# Patient Record
Sex: FEMALE | Race: WHITE | Employment: OTHER | URBAN - METROPOLITAN AREA
[De-identification: names, ages, dates, MRNs, and addresses within clinical notes are randomized per-mention and may not be internally consistent; named-entity substitution may affect disease eponyms.]

---

## 2018-09-10 ENCOUNTER — HOSPITAL ENCOUNTER (OUTPATIENT)
Dept: MAMMOGRAPHY | Age: 68
Discharge: HOME | End: 2018-09-15
Payer: COMMERCIAL

## 2018-09-10 DIAGNOSIS — Z12.31 VISIT FOR SCREENING MAMMOGRAM: ICD-10-CM

## 2018-09-10 PROCEDURE — 77067 SCR MAMMO BI INCL CAD: CPT

## 2018-09-11 DIAGNOSIS — Z12.31 VISIT FOR SCREENING MAMMOGRAM: Primary | ICD-10-CM

## 2019-11-24 ENCOUNTER — HOSPITAL ENCOUNTER (EMERGENCY)
Age: 69
Discharge: HOME | End: 2019-11-24
Attending: EMERGENCY MEDICINE
Payer: COMMERCIAL

## 2019-11-24 ENCOUNTER — APPOINTMENT (OUTPATIENT)
Dept: GENERAL RADIOLOGY | Age: 69
End: 2019-11-24
Payer: COMMERCIAL

## 2019-11-24 VITALS
BODY MASS INDEX: 30.82 KG/M2 | WEIGHT: 157 LBS | HEIGHT: 60 IN | SYSTOLIC BLOOD PRESSURE: 173 MMHG | RESPIRATION RATE: 20 BRPM | HEART RATE: 84 BPM | TEMPERATURE: 98 F | DIASTOLIC BLOOD PRESSURE: 87 MMHG | OXYGEN SATURATION: 99 %

## 2019-11-24 DIAGNOSIS — J40 BRONCHITIS: Primary | ICD-10-CM

## 2019-11-24 PROCEDURE — 71046 X-RAY EXAM CHEST 2 VIEWS: CPT

## 2019-11-24 PROCEDURE — 99283 EMERGENCY DEPT VISIT LOW MDM: CPT

## 2019-11-24 RX ORDER — PROMETHAZINE HYDROCHLORIDE AND CODEINE PHOSPHATE 6.25; 1 MG/5ML; MG/5ML
5 SYRUP ORAL 4 TIMES DAILY PRN
Qty: 60 ML | Status: SHIPPED | OUTPATIENT
Start: 2019-11-24 | End: 2019-12-01

## 2019-11-24 RX ORDER — CEFDINIR 300 MG/1
300 CAPSULE ORAL 2 TIMES DAILY
Qty: 20 CAPSULE | Refills: 0 | Status: SHIPPED | OUTPATIENT
Start: 2019-11-24 | End: 2019-12-04

## 2019-11-24 RX ORDER — FAMOTIDINE 20 MG/1
20 TABLET, FILM COATED ORAL 2 TIMES DAILY
COMMUNITY
End: 2020-02-26

## 2019-11-24 RX ORDER — FENOFIBRATE 145 MG/1
145 TABLET, COATED ORAL DAILY
COMMUNITY
End: 2020-07-14

## 2019-11-24 RX ORDER — LISINOPRIL 10 MG/1
10 TABLET ORAL DAILY
COMMUNITY
End: 2020-02-26

## 2019-11-24 RX ORDER — PROPRANOLOL HYDROCHLORIDE 80 MG/1
80 TABLET ORAL NIGHTLY
COMMUNITY

## 2019-11-24 RX ORDER — ALLOPURINOL 300 MG/1
300 TABLET ORAL DAILY
COMMUNITY

## 2019-11-24 ASSESSMENT — ENCOUNTER SYMPTOMS
VOMITING: 0
DIARRHEA: 0
EYE REDNESS: 0
BACK PAIN: 0
RHINORRHEA: 0
COUGH: 1
SHORTNESS OF BREATH: 0
ABDOMINAL PAIN: 0
EYE DISCHARGE: 0

## 2020-02-03 ENCOUNTER — HOSPITAL ENCOUNTER (OUTPATIENT)
Age: 70
Discharge: HOME | End: 2020-02-03
Payer: COMMERCIAL

## 2020-02-03 LAB
EKG ATRIAL RATE: 70 BPM
EKG DIAGNOSIS: NORMAL
EKG P AXIS: 68 DEGREES
EKG P-R INTERVAL: 170 MS
EKG Q-T INTERVAL: 382 MS
EKG QRS DURATION: 88 MS
EKG QTC CALCULATION (BAZETT): 412 MS
EKG R AXIS: 48 DEGREES
EKG T AXIS: 47 DEGREES
EKG VENTRICULAR RATE: 70 BPM

## 2020-02-03 PROCEDURE — 93005 ELECTROCARDIOGRAM TRACING: CPT | Performed by: FAMILY MEDICINE

## 2020-02-03 PROCEDURE — 93010 ELECTROCARDIOGRAM REPORT: CPT | Performed by: INTERNAL MEDICINE

## 2020-02-14 ENCOUNTER — HOSPITAL ENCOUNTER (OUTPATIENT)
Dept: VASCULAR LAB | Age: 70
Discharge: HOME | End: 2020-02-14
Payer: COMMERCIAL

## 2020-02-14 PROCEDURE — 93880 EXTRACRANIAL BILAT STUDY: CPT

## 2020-02-26 ENCOUNTER — HOSPITAL ENCOUNTER (EMERGENCY)
Age: 70
Discharge: HOME | End: 2020-02-26
Attending: EMERGENCY MEDICINE
Payer: COMMERCIAL

## 2020-02-26 VITALS
TEMPERATURE: 97.9 F | HEIGHT: 60 IN | HEART RATE: 96 BPM | WEIGHT: 159 LBS | BODY MASS INDEX: 31.22 KG/M2 | DIASTOLIC BLOOD PRESSURE: 96 MMHG | OXYGEN SATURATION: 99 % | RESPIRATION RATE: 16 BRPM | SYSTOLIC BLOOD PRESSURE: 173 MMHG

## 2020-02-26 LAB
ALBUMIN SERPL-MCNC: 4.5 G/DL (ref 3.4–5)
ANION GAP SERPL CALCULATED.3IONS-SCNC: 13 MMOL/L (ref 3–16)
BASOPHILS ABSOLUTE: 0 K/UL (ref 0–0.2)
BASOPHILS RELATIVE PERCENT: 1 %
BUN BLDV-MCNC: 25 MG/DL (ref 7–20)
CALCIUM SERPL-MCNC: 11.7 MG/DL (ref 8.3–10.6)
CHLORIDE BLD-SCNC: 101 MMOL/L (ref 99–110)
CO2: 25 MMOL/L (ref 21–32)
CREAT SERPL-MCNC: 1.6 MG/DL (ref 0.6–1.2)
EOSINOPHILS ABSOLUTE: 0.1 K/UL (ref 0–0.6)
EOSINOPHILS RELATIVE PERCENT: 2.8 %
GFR AFRICAN AMERICAN: 39
GFR NON-AFRICAN AMERICAN: 32
GLUCOSE BLD-MCNC: 226 MG/DL (ref 70–99)
HCT VFR BLD CALC: 44.8 % (ref 36–48)
HEMOGLOBIN: 15.1 G/DL (ref 12–16)
LYMPHOCYTES ABSOLUTE: 1.4 K/UL (ref 1–5.1)
LYMPHOCYTES RELATIVE PERCENT: 34.2 %
MCH RBC QN AUTO: 28.6 PG (ref 26–34)
MCHC RBC AUTO-ENTMCNC: 33.7 G/DL (ref 31–36)
MCV RBC AUTO: 85 FL (ref 80–100)
MONOCYTES ABSOLUTE: 0.3 K/UL (ref 0–1.3)
MONOCYTES RELATIVE PERCENT: 8.1 %
NEUTROPHILS ABSOLUTE: 2.2 K/UL (ref 1.7–7.7)
NEUTROPHILS RELATIVE PERCENT: 53.9 %
PDW BLD-RTO: 15.1 % (ref 12.4–15.4)
PLATELET # BLD: 132 K/UL (ref 135–450)
PMV BLD AUTO: 6.9 FL (ref 5–10.5)
POTASSIUM REFLEX MAGNESIUM: 3.9 MMOL/L (ref 3.5–5.1)
RBC # BLD: 5.27 M/UL (ref 4–5.2)
SODIUM BLD-SCNC: 139 MMOL/L (ref 136–145)
WBC # BLD: 4 K/UL (ref 4–11)

## 2020-02-26 PROCEDURE — 80048 BASIC METABOLIC PNL TOTAL CA: CPT

## 2020-02-26 PROCEDURE — 99283 EMERGENCY DEPT VISIT LOW MDM: CPT

## 2020-02-26 PROCEDURE — 36415 COLL VENOUS BLD VENIPUNCTURE: CPT

## 2020-02-26 PROCEDURE — 82040 ASSAY OF SERUM ALBUMIN: CPT

## 2020-02-26 PROCEDURE — 85025 COMPLETE CBC W/AUTO DIFF WBC: CPT

## 2020-02-26 RX ORDER — CETIRIZINE HYDROCHLORIDE 10 MG/1
TABLET ORAL
COMMUNITY
Start: 2019-11-26

## 2020-02-26 RX ORDER — TRIAMTERENE AND HYDROCHLOROTHIAZIDE 75; 50 MG/1; MG/1
1 TABLET ORAL DAILY
COMMUNITY
End: 2021-03-16 | Stop reason: ALTCHOICE

## 2020-02-26 RX ORDER — FAMOTIDINE 20 MG/1
TABLET, FILM COATED ORAL
COMMUNITY
End: 2021-03-16 | Stop reason: ALTCHOICE

## 2020-02-26 ASSESSMENT — ENCOUNTER SYMPTOMS
SORE THROAT: 0
COUGH: 0
WHEEZING: 0
CHEST TIGHTNESS: 0
RHINORRHEA: 0
DIARRHEA: 0
SHORTNESS OF BREATH: 0
ABDOMINAL PAIN: 0
STRIDOR: 0
TROUBLE SWALLOWING: 0
VOMITING: 0

## 2020-02-26 NOTE — ED NOTES
Pt DC home in good condition with family. V/u of Dc instructions. Denies questions or concerns. Teaching done re: s/s to report.      Efra Pickett RN  02/26/20 0061

## 2020-02-26 NOTE — ED PROVIDER NOTES
[address] [facility]      Pt Name: Red Miles  MRN: [id number]  [place] 1950  Date of evaluation: 2/26/2020  Severa Sailors, MD    [address] [place] [address]       Chief Complaint   Patient presents with    Abnormal Lab     pt sent by pcp for high calcium level drawn on Feb. 3rd. pt denies any complaints states she feels fine. HISTORY OF PRESENT ILLNESS   (Location/Symptom, Timing/Onset,Context/Setting, Quality, Duration, Modifying Factors, Severity)  Note limiting factors. Red Miles is a 78 y.o. female with hx of DM, HTN who presents to the emergency department for abnormal lab. Patient states her PCP called and instructed her to go to ED for elevated calcium. Patient states she's had an episode of fall 4 weeks ago, and pcp ordered labs done 2 weeks ago. However, her  works for [employer] so it's free for her to get labs there. She got it done 2 weeks ago and took it to her pcp 1.5 weeks ago, and was told that calcium was elevated and to go to ED. Patient unaware what the level was. States she is no longer falling, denies cp, sob, fever, n/v, blurry vision, weakness, fatigue. Lightheadedness, dizzy; states she's been feeling good, has no complaints. HPI    Nursing Notes were reviewed. REVIEW OF SYSTEMS    (2-9 systems for level 4, 10 or more for level 5)     Review of Systems   Constitutional: Negative for activity change, appetite change, diaphoresis and fever. HENT: Negative for congestion, rhinorrhea, sore throat and trouble swallowing. Respiratory: Negative for cough, chest tightness, shortness of breath, wheezing and stridor. Cardiovascular: Negative for chest pain and palpitations. Gastrointestinal: Negative for abdominal pain, diarrhea and vomiting. Genitourinary: Negative for dysuria and flank pain. Skin: Negative for rash and wound.    Neurological: Negative for dizziness, weakness, light-headedness, numbness and General: Skin is warm and dry. Findings: No rash. Neurological:      Mental Status: She is alert and oriented to person, place, and time. GCS: GCS eye subscore is 4. GCS verbal subscore is 5. GCS motor subscore is 6. Psychiatric:         Behavior: Behavior is cooperative. DIAGNOSTIC RESULTS     EKG: All EKG's are interpreted by the Emergency Department Physicianwho either signs or Co-signs this chart in the absence of a cardiologist.      RADIOLOGY:   Non-plain film images such as CT, Ultrasound and MRI are read by the radiologist. Plain radiographic images are visualized and preliminarily interpreted by the emergency physician with the below findings:      Interpretation per the Radiologist below, if available at the time of this note:    No orders to display         ED BEDSIDE ULTRASOUND:   Performed by ED Physician - none    LABS:  Labs Reviewed   CBC WITH AUTO DIFFERENTIAL - Abnormal; Notable for the following components:       Result Value    RBC 5.27 (*)     Platelets 411 (*)     All other components within normal limits    Narrative:     Performed at:  [facility]. [facility]  [address] [place] [address]. [place], [address] [email or web address]   Phone [phone]   BASIC METABOLIC PANEL W/ REFLEX TO MG FOR LOW K - Abnormal; Notable for the following components:    Glucose 226 (*)     BUN 25 (*)     CREATININE 1.6 (*)     GFR Non- 32 (*)     GFR  39 (*)     Calcium 11.7 (*)     All other components within normal limits    Narrative:     Performed at:  [facility]. [facility]  [address] [place] [address]. [facility], [address] [email or web address]   Phone [phone]   ALBUMIN    Narrative:     Performed at:  [facility]. [facility]  [address] [place] [address]. [facility], [address]   Phone [phone]       All other labs were within normal range ornot returned as of this dictation.     EMERGENCY DEPARTMENT COURSE and DIFFERENTIAL DIAGNOSIS/MDM:   Vitals:    Vitals:    02/26/20 1452   BP: (!) 173/96   Pulse: 96   Resp: 16   Temp: 97.9 °F (36.6 °C)   TempSrc: Oral   SpO2: 99%   Weight: 159 lb (72.1 kg)   Height: 5' (1.524 m)         [facility]    ED COURSE/MDM    -Renu Child is a 78 y.o. female with a history of diabetes, hypertension presents to ED for abnormal lab. Patient states she was sent for elevated calcium that was done on 2/3/2020. Patient is asymptomatic with no complaints  -per family cr 2.06 and ca 12.5 (2/3). per chart review, labs from [id number] shows calcium of 10.7-11.4 with creatinine 1.09-1.89 . Patient seems to have calcium that runs a little bit above the upper end of normal at baseline.   -Labs here today shows a creatinine of 1.6, calcium 11.7 (11.3 - corrected). So as compared to labs from 2/3, both creatinine and calcium seem to have improved. Given level of 11.3, patient asymptomatic do not feel that immediate treatment is needed at this time.    -Labs and imaging reviewed and results discussed with patient. Noacute pathology was noted and plan for discharge home with close follow up with PCP was discussed with patient. Instructed on what to avoid anything that might aggravate hypercalcemia. He is on hydrochlorothiazide for her blood pressure, can aggravate hypercalcemia. Instructed her to call her primary care tomorrow to see if here she would be willing to change her blood pressure to another medication that will not aggravate the calcium. Also instructed her to discuss possible CKD as her creatinine level seems to have been elevating over the last several years. It is however improved today from prior levels. -Strict ED return precautions given for new/concerning symptoms. Patient in agreement withplan, verbally confirm understanding and have no further questions/concerns.      REASSESSMENT      Well appearing, non toxic, alert, oriented speaking in full sentences and hemodynamically stable

## 2020-07-14 ENCOUNTER — APPOINTMENT (OUTPATIENT)
Dept: CT IMAGING | Age: 70
End: 2020-07-14
Payer: COMMERCIAL

## 2020-07-14 ENCOUNTER — APPOINTMENT (OUTPATIENT)
Dept: GENERAL RADIOLOGY | Age: 70
End: 2020-07-14
Payer: COMMERCIAL

## 2020-07-14 ENCOUNTER — HOSPITAL ENCOUNTER (EMERGENCY)
Age: 70
Discharge: ANOTHER HOSPITAL | End: 2020-07-14
Attending: EMERGENCY MEDICINE
Payer: COMMERCIAL

## 2020-07-14 ENCOUNTER — HOSPITAL ENCOUNTER (OUTPATIENT)
Age: 70
Setting detail: OBSERVATION
Discharge: HOME | End: 2020-07-15
Attending: INTERNAL MEDICINE | Admitting: INTERNAL MEDICINE
Payer: COMMERCIAL

## 2020-07-14 VITALS
OXYGEN SATURATION: 96 % | WEIGHT: 180 LBS | TEMPERATURE: 98.8 F | BODY MASS INDEX: 35.34 KG/M2 | HEIGHT: 60 IN | HEART RATE: 85 BPM | SYSTOLIC BLOOD PRESSURE: 198 MMHG | RESPIRATION RATE: 22 BRPM | DIASTOLIC BLOOD PRESSURE: 93 MMHG

## 2020-07-14 PROBLEM — G45.9 TIA (TRANSIENT ISCHEMIC ATTACK): Status: ACTIVE | Noted: 2020-07-14

## 2020-07-14 LAB
A/G RATIO: 1.9 (ref 1.1–2.2)
ALBUMIN SERPL-MCNC: 4.8 G/DL (ref 3.4–5)
ALP BLD-CCNC: 49 U/L (ref 40–129)
ALT SERPL-CCNC: 27 U/L (ref 10–40)
AMPHETAMINE SCREEN, URINE: NORMAL
ANION GAP SERPL CALCULATED.3IONS-SCNC: 12 MMOL/L (ref 3–16)
AST SERPL-CCNC: 37 U/L (ref 15–37)
BACTERIA: ABNORMAL /HPF
BARBITURATE SCREEN URINE: NORMAL
BASOPHILS ABSOLUTE: 0.1 K/UL (ref 0–0.2)
BASOPHILS RELATIVE PERCENT: 1.1 %
BENZODIAZEPINE SCREEN, URINE: NORMAL
BILIRUB SERPL-MCNC: 0.8 MG/DL (ref 0–1)
BILIRUBIN URINE: NEGATIVE
BLOOD, URINE: NEGATIVE
BUN BLDV-MCNC: 21 MG/DL (ref 7–20)
CALCIUM SERPL-MCNC: 11.2 MG/DL (ref 8.3–10.6)
CANNABINOID SCREEN URINE: NORMAL
CHLORIDE BLD-SCNC: 98 MMOL/L (ref 99–110)
CLARITY: CLEAR
CO2: 28 MMOL/L (ref 21–32)
COCAINE METABOLITE SCREEN URINE: NORMAL
COLOR: YELLOW
CREAT SERPL-MCNC: 1.5 MG/DL (ref 0.6–1.2)
EKG ATRIAL RATE: 107 BPM
EKG DIAGNOSIS: NORMAL
EKG P AXIS: 53 DEGREES
EKG P-R INTERVAL: 184 MS
EKG Q-T INTERVAL: 344 MS
EKG QRS DURATION: 78 MS
EKG QTC CALCULATION (BAZETT): 459 MS
EKG R AXIS: 27 DEGREES
EKG T AXIS: 66 DEGREES
EKG VENTRICULAR RATE: 107 BPM
EOSINOPHILS ABSOLUTE: 0.1 K/UL (ref 0–0.6)
EOSINOPHILS RELATIVE PERCENT: 2.6 %
EPITHELIAL CELLS, UA: ABNORMAL /HPF (ref 0–5)
ETHANOL: NORMAL MG/DL (ref 0–0.08)
GFR AFRICAN AMERICAN: 41
GFR NON-AFRICAN AMERICAN: 34
GLOBULIN: 2.5 G/DL
GLUCOSE BLD-MCNC: 169 MG/DL (ref 70–99)
GLUCOSE BLD-MCNC: 169 MG/DL (ref 70–99)
GLUCOSE URINE: 250 MG/DL
HCT VFR BLD CALC: 45.5 % (ref 36–48)
HEMOGLOBIN: 15 G/DL (ref 12–16)
KETONES, URINE: NEGATIVE MG/DL
LEUKOCYTE ESTERASE, URINE: NEGATIVE
LYMPHOCYTES ABSOLUTE: 1.7 K/UL (ref 1–5.1)
LYMPHOCYTES RELATIVE PERCENT: 36.8 %
Lab: NORMAL
MAGNESIUM: 2.2 MG/DL (ref 1.8–2.4)
MCH RBC QN AUTO: 27.5 PG (ref 26–34)
MCHC RBC AUTO-ENTMCNC: 33 G/DL (ref 31–36)
MCV RBC AUTO: 83.5 FL (ref 80–100)
METHADONE SCREEN, URINE: NORMAL
MICROSCOPIC EXAMINATION: YES
MONOCYTES ABSOLUTE: 0.4 K/UL (ref 0–1.3)
MONOCYTES RELATIVE PERCENT: 8.3 %
NEUTROPHILS ABSOLUTE: 2.4 K/UL (ref 1.7–7.7)
NEUTROPHILS RELATIVE PERCENT: 51.2 %
NITRITE, URINE: NEGATIVE
OPIATE SCREEN URINE: NORMAL
OXYCODONE URINE: NORMAL
PDW BLD-RTO: 15 % (ref 12.4–15.4)
PERFORMED ON: ABNORMAL
PH UA: 7
PH UA: 7 (ref 5–8)
PHENCYCLIDINE SCREEN URINE: NORMAL
PLATELET # BLD: 143 K/UL (ref 135–450)
PMV BLD AUTO: 7.4 FL (ref 5–10.5)
POTASSIUM REFLEX MAGNESIUM: 3.4 MMOL/L (ref 3.5–5.1)
PROPOXYPHENE SCREEN: NORMAL
PROTEIN UA: 30 MG/DL
RBC # BLD: 5.45 M/UL (ref 4–5.2)
RBC UA: ABNORMAL /HPF (ref 0–4)
SODIUM BLD-SCNC: 138 MMOL/L (ref 136–145)
SPECIFIC GRAVITY UA: 1.02 (ref 1–1.03)
TOTAL PROTEIN: 7.3 G/DL (ref 6.4–8.2)
TROPONIN: <0.01 NG/ML
URINE REFLEX TO CULTURE: ABNORMAL
URINE TYPE: ABNORMAL
UROBILINOGEN, URINE: 1 E.U./DL
WBC # BLD: 4.6 K/UL (ref 4–11)
WBC UA: ABNORMAL /HPF (ref 0–5)

## 2020-07-14 PROCEDURE — 93005 ELECTROCARDIOGRAM TRACING: CPT | Performed by: EMERGENCY MEDICINE

## 2020-07-14 PROCEDURE — 83735 ASSAY OF MAGNESIUM: CPT

## 2020-07-14 PROCEDURE — G0378 HOSPITAL OBSERVATION PER HR: HCPCS

## 2020-07-14 PROCEDURE — 84484 ASSAY OF TROPONIN QUANT: CPT

## 2020-07-14 PROCEDURE — 6370000000 HC RX 637 (ALT 250 FOR IP)

## 2020-07-14 PROCEDURE — 80307 DRUG TEST PRSMV CHEM ANLYZR: CPT

## 2020-07-14 PROCEDURE — 99285 EMERGENCY DEPT VISIT HI MDM: CPT

## 2020-07-14 PROCEDURE — 93010 ELECTROCARDIOGRAM REPORT: CPT | Performed by: INTERNAL MEDICINE

## 2020-07-14 PROCEDURE — 85025 COMPLETE CBC W/AUTO DIFF WBC: CPT

## 2020-07-14 PROCEDURE — G0379 DIRECT REFER HOSPITAL OBSERV: HCPCS

## 2020-07-14 PROCEDURE — 70450 CT HEAD/BRAIN W/O DYE: CPT

## 2020-07-14 PROCEDURE — 80053 COMPREHEN METABOLIC PANEL: CPT

## 2020-07-14 PROCEDURE — 81001 URINALYSIS AUTO W/SCOPE: CPT

## 2020-07-14 PROCEDURE — G0480 DRUG TEST DEF 1-7 CLASSES: HCPCS

## 2020-07-14 PROCEDURE — 71045 X-RAY EXAM CHEST 1 VIEW: CPT

## 2020-07-14 PROCEDURE — 6370000000 HC RX 637 (ALT 250 FOR IP): Performed by: EMERGENCY MEDICINE

## 2020-07-14 PROCEDURE — 2580000003 HC RX 258: Performed by: INTERNAL MEDICINE

## 2020-07-14 RX ORDER — SODIUM CHLORIDE 0.9 % (FLUSH) 0.9 %
10 SYRINGE (ML) INJECTION PRN
Status: DISCONTINUED | OUTPATIENT
Start: 2020-07-14 | End: 2020-07-15 | Stop reason: HOSPADM

## 2020-07-14 RX ORDER — PROMETHAZINE HYDROCHLORIDE 25 MG/1
12.5 TABLET ORAL EVERY 6 HOURS PRN
Status: DISCONTINUED | OUTPATIENT
Start: 2020-07-14 | End: 2020-07-15 | Stop reason: HOSPADM

## 2020-07-14 RX ORDER — TRIAMTERENE AND HYDROCHLOROTHIAZIDE 37.5; 25 MG/1; MG/1
2 TABLET ORAL DAILY
Status: DISCONTINUED | OUTPATIENT
Start: 2020-07-15 | End: 2020-07-15 | Stop reason: HOSPADM

## 2020-07-14 RX ORDER — PROPRANOLOL HYDROCHLORIDE 40 MG/1
80 TABLET ORAL NIGHTLY
Status: DISCONTINUED | OUTPATIENT
Start: 2020-07-14 | End: 2020-07-15 | Stop reason: HOSPADM

## 2020-07-14 RX ORDER — SODIUM CHLORIDE 0.9 % (FLUSH) 0.9 %
10 SYRINGE (ML) INJECTION EVERY 12 HOURS SCHEDULED
Status: DISCONTINUED | OUTPATIENT
Start: 2020-07-14 | End: 2020-07-15 | Stop reason: HOSPADM

## 2020-07-14 RX ORDER — PROPRANOLOL HYDROCHLORIDE 10 MG/1
TABLET ORAL
Status: COMPLETED
Start: 2020-07-14 | End: 2020-07-14

## 2020-07-14 RX ORDER — POLYETHYLENE GLYCOL 3350 17 G/17G
17 POWDER, FOR SOLUTION ORAL DAILY PRN
Status: DISCONTINUED | OUTPATIENT
Start: 2020-07-14 | End: 2020-07-15 | Stop reason: HOSPADM

## 2020-07-14 RX ORDER — ASPIRIN 81 MG/1
81 TABLET, CHEWABLE ORAL DAILY
Status: DISCONTINUED | OUTPATIENT
Start: 2020-07-15 | End: 2020-07-15 | Stop reason: HOSPADM

## 2020-07-14 RX ORDER — ACETAMINOPHEN 325 MG/1
650 TABLET ORAL EVERY 6 HOURS PRN
Status: DISCONTINUED | OUTPATIENT
Start: 2020-07-14 | End: 2020-07-15 | Stop reason: HOSPADM

## 2020-07-14 RX ORDER — ASPIRIN 325 MG
325 TABLET ORAL ONCE
Status: COMPLETED | OUTPATIENT
Start: 2020-07-14 | End: 2020-07-14

## 2020-07-14 RX ORDER — ACETAMINOPHEN 650 MG/1
650 SUPPOSITORY RECTAL EVERY 6 HOURS PRN
Status: DISCONTINUED | OUTPATIENT
Start: 2020-07-14 | End: 2020-07-15 | Stop reason: HOSPADM

## 2020-07-14 RX ORDER — ALLOPURINOL 300 MG/1
300 TABLET ORAL DAILY
Status: DISCONTINUED | OUTPATIENT
Start: 2020-07-15 | End: 2020-07-15 | Stop reason: HOSPADM

## 2020-07-14 RX ORDER — FAMOTIDINE 20 MG/1
20 TABLET, FILM COATED ORAL DAILY
Status: DISCONTINUED | OUTPATIENT
Start: 2020-07-15 | End: 2020-07-15 | Stop reason: HOSPADM

## 2020-07-14 RX ORDER — ONDANSETRON 2 MG/ML
4 INJECTION INTRAMUSCULAR; INTRAVENOUS EVERY 6 HOURS PRN
Status: DISCONTINUED | OUTPATIENT
Start: 2020-07-14 | End: 2020-07-15 | Stop reason: HOSPADM

## 2020-07-14 RX ORDER — PROPRANOLOL HYDROCHLORIDE 40 MG/1
80 TABLET ORAL ONCE
Status: COMPLETED | OUTPATIENT
Start: 2020-07-14 | End: 2020-07-14

## 2020-07-14 RX ADMIN — PROPRANOLOL HYDROCHLORIDE 80 MG: 40 TABLET ORAL at 18:18

## 2020-07-14 RX ADMIN — PROPRANOLOL HYDROCHLORIDE 80 MG: 10 TABLET ORAL at 18:18

## 2020-07-14 RX ADMIN — ASPIRIN 325 MG: 325 TABLET, FILM COATED ORAL at 17:32

## 2020-07-14 RX ADMIN — Medication 10 ML: at 22:38

## 2020-07-14 ASSESSMENT — ENCOUNTER SYMPTOMS
ABDOMINAL PAIN: 0
VOICE CHANGE: 0
TROUBLE SWALLOWING: 0
NAUSEA: 0
SHORTNESS OF BREATH: 0
FACIAL SWELLING: 0
VOMITING: 0
COLOR CHANGE: 0
WHEEZING: 0
STRIDOR: 0

## 2020-07-14 ASSESSMENT — PAIN SCALES - GENERAL: PAINLEVEL_OUTOF10: 0

## 2020-07-14 NOTE — ED NOTES
1739 - called [facility] for transfer to [facility]  1500 - pt arrived at ED  NIH - 0  Last know well - today (7-14-20) @ [address]  07/14/20 [facility]  07/14/20 1742    1804 - Dr. Karol Carter called back via [facility]  07/14/20 1807    Dr. Christoph Ocasio gave [facility] transport needs while on phone.       Nikita Contreras  07/14/20 1834

## 2020-07-14 NOTE — ED NOTES
Dr. Aleksandra Tran at bedside for initial NIHSS and assessment of pt. Per verbal order pt is not a candidate for TPA d/t rapid increase in s/s of initial deficits. Terrence Lares RN  07/14/20 1917

## 2020-07-14 NOTE — ED NOTES
Pt alert and verbal, family remains at bedside. Pt able to ambulate self to BR with standby assist of 1. Pt given specimen cup at this time. Leyla Hernandez RN  07/14/20 5981

## 2020-07-14 NOTE — ED PROVIDER NOTES
[address] ED  eMERGENCY dEPARTMENT eNCOUnter      Pt Name: Farooq Estrada  MRN: [id number]  [place] 1950  Date of evaluation: 7/14/2020  Provider: Rhea Wilson MD    CHIEF COMPLAINT       Chief Complaint   Patient presents with    Cerebrovascular Accident     Pt to ED via family pt found in bathtub unable to speak. Pt last seen well @ 1400 today. Family states pt has been confusing words for about a week. Pt garbled speech at 1430         HISTORY OF PRESENT ILLNESS   (Location/Symptom, Timing/Onset, Context/Setting, Quality, Duration, Modifying Factors, Severity)  Note limiting factors. Farooq Estrada is a 78 y.o. female with hx of diabetes, GERD, and hypertension who presents due to significantly garbled speech and concern for stroke which has since resolved. The patient's  reports that the patient has had intermittent confused speech and word finding difficulty for approximately 1 week. He reports he talked her at 2 PM today and she was speaking normally however at 230 the patient was found in the bathtub with severely garbled speech to the point where she was unable to communicate. The patient's  also reports that she was daniella her arm strangely as well. He reports he took her to the hospital and her symptoms have since resolved. He reports the symptoms were severe, lasted for approximately 30 minutes, and resolved spontaneously. He denies any known aggravating or alleviating factors. The patient reports that she remembers not being able to speak but feels better now. She denies any current symptoms. HPI    Nursing Notes were reviewed. REVIEW OFSYSTEMS    (2-9 systems for level 4, 10 or more for level 5)     Review of Systems   Constitutional: Negative for appetite change, fever and unexpected weight change. HENT: Negative for facial swelling, trouble swallowing and voice change. Eyes: Negative for visual disturbance.    Respiratory: Negative for shortness of breath, wheezing and stridor. Cardiovascular: Negative for chest pain and palpitations. Gastrointestinal: Negative for abdominal pain, nausea and vomiting. Genitourinary: Negative for dysuria and vaginal bleeding. Musculoskeletal: Negative for neck pain and neck stiffness. Skin: Negative for color change and wound. Neurological: Positive for speech difficulty. Negative for seizures and syncope. Psychiatric/Behavioral: Negative for self-injury and suicidal ideas. Except as noted above the remainder of the review of systems was reviewed and negative. PAST MEDICAL HISTORY     Past Medical History:   Diagnosis Date    Diabetes mellitus ([facility] [address]     GERD (gastroesophageal reflux disease)     Hypertension          SURGICAL HISTORY     History reviewed. No pertinent surgical history. CURRENT MEDICATIONS       Previous Medications    ALLOPURINOL (ZYLOPRIM) 300 MG TABLET    Take 300 mg by mouth daily    CETIRIZINE (ZYRTEC) 10 MG TABLET    TAKE 1 TABLET BY MOUTH EVERY DAY    FAMOTIDINE (PEPCID) 20 MG TABLET    Take by mouth    METFORMIN (GLUCOPHAGE) 500 MG TABLET    Take 500 mg by mouth daily (with breakfast)     PROPRANOLOL (INDERAL) 80 MG TABLET    Take 80 mg by mouth nightly     TRIAMTERENE-HYDROCHLOROTHIAZIDE (MAXZIDE) 75-50 MG PER TABLET    Take 1 tablet by mouth daily       ALLERGIES     Amoxicillin; Bactrim [sulfamethoxazole-trimethoprim]; Biaxin [clarithromycin]; Calan [verapamil]; and Ciprofloxacin    FAMILY HISTORY     History reviewed. No pertinent family history.        SOCIAL HISTORY       Social History     Socioeconomic History    Marital status:      Spouse name: None    Number of children: None    Years of education: None    Highest education level: None   Occupational History    None   Social Needs    Financial resource strain: None    Food insecurity     Worry: None     Inability: None    Transportation needs     Medical: None     Non-medical: None   Tobacco Use    Smoking status: Never Smoker    Smokeless tobacco: Never Used   Substance and Sexual Activity    Alcohol use: Not Currently    Drug use: Never    Sexual activity: Not Currently   Lifestyle    Physical activity     Days per week: None     Minutes per session: None    Stress: None   Relationships    Social connections     Talks on phone: None     Gets together: None     Attends [employer] service: None     Active member of club or organization: None     Attends meetings of clubs or organizations: None     Relationship status: None    Intimate partner violence     Fear of current or ex partner: None     Emotionally abused: None     Physically abused: None     Forced sexual activity: None   Other Topics Concern    None   Social History Narrative    None         PHYSICAL EXAM    (up to 7 for level 4, 8 or more for level 5)     Vitals:    07/14/20 1534 07/14/20 1549 07/14/20 1604 07/14/20 1618   BP: (!) 209/110 (!) 201/98 (!) 190/95 (!) 183/103   Pulse: 105 108 105 108   Resp: 21 23 18 13   Temp:       TempSrc:       SpO2: 98% 97% 97% 95%   Weight:       Height:             Physical Exam  Vitals signs and nursing note reviewed. Constitutional:       Appearance: She is well-developed. She is not diaphoretic. HENT:      Head: Normocephalic and atraumatic. Right Ear: External ear normal.      Left Ear: External ear normal.   Eyes:      Conjunctiva/sclera: Conjunctivae normal.   Neck:      Musculoskeletal: Neck supple. Vascular: No JVD. Trachea: No tracheal deviation. Cardiovascular:      Rate and Rhythm: Normal rate. Pulmonary:      Effort: Pulmonary effort is normal. No respiratory distress. Breath sounds: Normal breath sounds. No wheezing. Abdominal:      General: There is no distension. Palpations: Abdomen is soft. Tenderness: There is no abdominal tenderness. There is no guarding or rebound. Musculoskeletal: Normal range of motion.          General: No tenderness or deformity. Skin:     General: Skin is warm and dry. Neurological:      Mental Status: She is alert. Cranial Nerves: No cranial nerve deficit. Comments: No facial droop. Normal speech. Alert and oriented to person, place, time, and situation. Able to perform serial sevens. Sensation intact and equal in all CN V distributions of the face bilaterally. 5/5 strength in all 4 extremities. Normal gait. Normal sensation equal in all 4 extremities. Negative romberg. Normal finger to nose and heel to shin. NIH Stroke Scale     Interval: Baseline  Time: 3:12PM  Person Administering Scale: [address] stroke scale items in the order listed. Record performance in each category after each subscale exam. Do not go back and change scores. Follow directions provided for each exam technique. Scores should reflect what the patient does, not what the clinician thinks the patient can do. The clinician should record answers while administering the exam and work quickly. Except where indicated, the patient should not be coached (i.e., repeated requests to patient to make a special effort). 1a  Level of consciousness: 0=alert; keenly responsive   1b. LOC questions:  0=Performs both tasks correctly   1c. LOC commands: 0=Performs both tasks correctly   2. Best Gaze: 0=normal   3. Visual: 0=No visual loss   4. Facial Palsy: 0=Normal symmetric movement   5a. Motor left arm: 0=No drift, limb holds 90 (or 45) degrees for full 10 seconds   5b. Motor right arm: 0=No drift, limb holds 90 (or 45) degrees for full 10 seconds   6a. motor left le=No drift, limb holds 90 (or 45) degrees for full 10 seconds   6b  Motor right le=No drift, limb holds 90 (or 45) degrees for full 10 seconds   7. Limb Ataxia: 0=Absent   8. Sensory: 0=Normal; no sensory loss   9. Best Language:  0=No aphasia, normal   10. Dysarthria: 0=Normal   11.  Extinction and Inattention: 0=No abnormality following components:    Bacteria, UA 1+ (*)     All other components within normal limits    Narrative:     Performed at:  [facility]) - [facility],  [place], [employer]   Phone [phone]   POCT GLUCOSE - Abnormal; Notable for the following components:    POC Glucose 169 (*)     All other components within normal limits    Narrative:     Performed at:  [facility],  [place], [place] [email or web address]   Phone [phone]   TROPONIN    Narrative:     Performed at:  [facility],  [place] [email or web address]   Phone [phone]   ETHANOL    Narrative:     Performed at:  [facility],  [place], [employer]   Phone [phone]   URINE DRUG SCREEN    Narrative:     Performed at:  [facility],  [place], [employer]   Phone [phone]   MAGNESIUM    Narrative:     Performed at:  [facility]) - [facility],  [place], [employer]   Phone [phone]       All otherlabs were within normal range or not returned as of this dictation. EMERGENCY DEPARTMENT COURSE and DIFFERENTIAL DIAGNOSIS/MDM:   Vitals:    Vitals:    07/14/20 1534 07/14/20 1549 07/14/20 1604 07/14/20 1618   BP: (!) 209/110 (!) 201/98 (!) 190/95 (!) 183/103   Pulse: 105 108 105 108   Resp: 21 23 18 13   Temp:       TempSrc:       SpO2: 98% 97% 97% 95%   Weight:       Height:             MDM  Patient has no focal neurologic deficits on arrival and therefore code stroke is not called. Head CT shows chronic small ischemic disease with age involutional change. I suspect likely TIA based on the history provided by the patient and her . I feel the patient is appropriate for aspirin, transfer to [facility] for neurology evaluation and MRI.   The patient and her  expressed understanding and agreement with this plan and the patient is admitted for further care. CONSULTS:  IP CONSULT TO HOSPITALIST         Procedures    FINAL IMPRESSION      1. TIA (transient ischemic attack)          DISPOSITION/PLAN   DISPOSITION  Transfer        (Please note that portions of this note were completed with a voice recognition program.  Efforts were made to edit the dictations but occasionally words aremis-transcribed. )    Elva Morrow MD (electronically signed)  Attending Emergency Physician           Elva Morrow MD  07/14/20 [phone]

## 2020-07-14 NOTE — ED NOTES
[facility] called to give Floor [address] 361-1  Report # [id number]   [facility] will be here @ 2100     Antonietta Quinteros  07/14/20 1938    [facility] has arrived     Antonietta Quinteros  07/14/20 2106    [facility] has left the hospital with patient     Antonietta Quinteros  07/14/20 2119

## 2020-07-15 ENCOUNTER — APPOINTMENT (OUTPATIENT)
Dept: MRI IMAGING | Age: 70
End: 2020-07-15
Attending: INTERNAL MEDICINE
Payer: COMMERCIAL

## 2020-07-15 ENCOUNTER — APPOINTMENT (OUTPATIENT)
Dept: VASCULAR LAB | Age: 70
End: 2020-07-15
Attending: INTERNAL MEDICINE
Payer: COMMERCIAL

## 2020-07-15 VITALS
HEIGHT: 60 IN | BODY MASS INDEX: 31.2 KG/M2 | SYSTOLIC BLOOD PRESSURE: 143 MMHG | RESPIRATION RATE: 16 BRPM | TEMPERATURE: 98.1 F | OXYGEN SATURATION: 95 % | WEIGHT: 158.9 LBS | HEART RATE: 64 BPM | DIASTOLIC BLOOD PRESSURE: 87 MMHG

## 2020-07-15 LAB
CHOLESTEROL, TOTAL: 180 MG/DL (ref 0–199)
HDLC SERPL-MCNC: 22 MG/DL (ref 40–60)
LDL CHOLESTEROL CALCULATED: ABNORMAL MG/DL
LDL CHOLESTEROL DIRECT: 95 MG/DL
LV EF: 58 %
LVEF MODALITY: NORMAL
TRIGL SERPL-MCNC: 406 MG/DL (ref 0–150)
VLDLC SERPL CALC-MCNC: ABNORMAL MG/DL

## 2020-07-15 PROCEDURE — 2580000003 HC RX 258: Performed by: INTERNAL MEDICINE

## 2020-07-15 PROCEDURE — 83036 HEMOGLOBIN GLYCOSYLATED A1C: CPT

## 2020-07-15 PROCEDURE — 83721 ASSAY OF BLOOD LIPOPROTEIN: CPT

## 2020-07-15 PROCEDURE — G0378 HOSPITAL OBSERVATION PER HR: HCPCS

## 2020-07-15 PROCEDURE — 36415 COLL VENOUS BLD VENIPUNCTURE: CPT

## 2020-07-15 PROCEDURE — 96372 THER/PROPH/DIAG INJ SC/IM: CPT

## 2020-07-15 PROCEDURE — 80061 LIPID PANEL: CPT

## 2020-07-15 PROCEDURE — 99220 PR INITIAL OBSERVATION CARE/DAY 70 MINUTES: CPT | Performed by: PSYCHIATRY & NEUROLOGY

## 2020-07-15 PROCEDURE — 93880 EXTRACRANIAL BILAT STUDY: CPT

## 2020-07-15 PROCEDURE — 6370000000 HC RX 637 (ALT 250 FOR IP): Performed by: INTERNAL MEDICINE

## 2020-07-15 PROCEDURE — 70551 MRI BRAIN STEM W/O DYE: CPT

## 2020-07-15 PROCEDURE — 6360000002 HC RX W HCPCS: Performed by: INTERNAL MEDICINE

## 2020-07-15 PROCEDURE — 93306 TTE W/DOPPLER COMPLETE: CPT

## 2020-07-15 RX ORDER — ASPIRIN 81 MG/1
81 TABLET, CHEWABLE ORAL DAILY
Qty: 30 TABLET | Refills: 0 | COMMUNITY
Start: 2020-07-16

## 2020-07-15 RX ADMIN — ASPIRIN 81 MG: 81 TABLET, CHEWABLE ORAL at 10:10

## 2020-07-15 RX ADMIN — Medication 10 ML: at 10:12

## 2020-07-15 RX ADMIN — FAMOTIDINE 20 MG: 20 TABLET ORAL at 10:10

## 2020-07-15 RX ADMIN — ENOXAPARIN SODIUM 30 MG: 30 INJECTION SUBCUTANEOUS at 10:11

## 2020-07-15 RX ADMIN — ALLOPURINOL 300 MG: 300 TABLET ORAL at 10:11

## 2020-07-15 ASSESSMENT — PAIN SCALES - GENERAL
PAINLEVEL_OUTOF10: 0
PAINLEVEL_OUTOF10: 0

## 2020-07-15 NOTE — DISCHARGE SUMMARY
Hospital Medicine Discharge Summary    Patient ID: Fabricio Gandara      Patient's PCP: Leah Goodpasture, MD    Admit Date: 7/14/2020     Discharge Date: 7/15/2020      Admitting Physician: Lisa Abdi MD     Discharge Physician: Devyn Hernandez MD     Discharge Diagnoses: Active Hospital Problems    Diagnosis    HTN (hypertension), benign [I10]    Dyslipidemia [E78.5]    TIA (transient ischemic attack) [G45.9]    Hypertriglyceridemia [E78.1]    Essential hypertension with goal blood pressure less than 140/90 [I10]    DM (diabetes mellitus), type 2, uncontrolled with complications (HCC) [B38.9, E11.65]       The patient was seen and examined on day of discharge and this discharge summary is in conjunction with any daily progress note from day of discharge. Hospital Course:   History Of Present Illness:   78 y.o. female who presented to [facility] with above complaints  Patient reports she was at home earlier today, when she was found in the bathtub by her  with severe garbled speech, confusion, was unable to speak.  reported to the ER staff that patient has been having speech problems and word finding difficulty for the last 1 week. Patient remembers the incident earlier today and reports that she could not bring out the words that she wanted to. She reports symptoms got better by the time she came to the ED. she denies any history of stroke or TIA in the past.  She denied any dizziness, lightheadedness, blurring of vision, focal numbness weakness or tingling. No loss of consciousness reported. No seizure-like activity reported.          Transient and recurrent speech deficit  Likely TIA.   Could rule out mild stroke.  Symptoms on and off for the last 1 week.   Initial CT head normal.   - Other DDx  including heme/metabolic derangements, encephalopathy, seizures, migraines, syncope less likely  - Neuroimaging -  MRI brain w/o contrast was neg for acute abn,  carotid doppler was unremarkable, 2D ECHO ordered as indicated(EF 55%, g1 dd, neg bubble study)  - started On ASA 81 mg  - EKG + Telemetry (review for Afib or other suggestion of cardiac embolic source) was neg, consider outpt holter monitor  -Neurology was consulted  - rec Aggressive risk factor management of HTN, DM, HLD, obesity       Hyperlipidemia  -FLP done, somewhat controlled(total 180, hdl 22, ldl 95, G 406), consider niacin as outpt given low HDL     Hypertension  -Not at goal 140/90.  Home med Maxzide, propranolol resumed.  May need dose titration to achieve goal BP which is better done in outpatient setting     DM2  -controlled, Metformin held.  Placed on low-dose sliding scale insulin.  A1c 6.9     Obesity  BMI 31.03.  Counseled on weight loss and exercise.          Physical Exam Performed:     BP (!) 143/87   Pulse 64   Temp 98.1 °F (36.7 °C) (Oral)   Resp 16   Ht 5' (1.524 m)   Wt 158 lb 14.4 oz (72.1 kg)   SpO2 95%   BMI 31.03 kg/m²       General appearance:  No apparent distress, appears stated age and cooperative. HEENT:  Normal cephalic, atraumatic without obvious deformity. Pupils equal, round, and reactive to light. Extra ocular muscles intact. Conjunctivae/corneas clear. Neck: Supple, with full range of motion. No jugular venous distention. Trachea midline. Respiratory:  Normal respiratory effort. Clear to auscultation, bilaterally without Rales/Wheezes/Rhonchi. Cardiovascular:  Regular rate and rhythm with normal S1/S2 without murmurs, rubs or gallops. Abdomen: Soft, non-tender, non-distended with normal bowel sounds. Musculoskeletal:  No clubbing, cyanosis or edema bilaterally. Full range of motion without deformity. Skin: Skin color, texture, turgor normal.  No rashes or lesions. Neurologic:  Neurovascularly intact without any focal sensory/motor deficits.  Cranial nerves: II-XII intact, grossly non-focal.  Psychiatric:  Alert and oriented, thought content appropriate, normal insight  Capillary Refill: Brisk,< 3 seconds   Peripheral Pulses: +2 palpable, equal bilaterally       Labs: For convenience and continuity at follow-up the following most recent labs are provided:      CBC:    Lab Results   Component Value Date    WBC 4.6 07/14/2020    HGB 15.0 07/14/2020    HCT 45.5 07/14/2020     07/14/2020       Renal:    Lab Results   Component Value Date     07/14/2020    K 3.4 07/14/2020    CL 98 07/14/2020    CO2 28 07/14/2020    BUN 21 07/14/2020    CREATININE 1.5 07/14/2020    CALCIUM 11.2 07/14/2020         Significant Diagnostic Studies    Radiology:   MRI Brain WO Contrast   Final Result   1. No acute intracranial abnormality. No acute infarct. 2. Mild global parenchymal volume loss with minimal chronic microvascular   ischemic changes.          VL DUP CAROTID BILATERAL   Final Result             Consults:     IP CONSULT TO NEUROLOGY    Disposition:  home     Condition at Discharge: Stable    Discharge Instructions/Follow-up: PCP and Neuro as outpt, discuss need for statin as outpt, consider cardiac monitor as outpt(discuss with Neuro and PCP)    Code Status:  FULL    Activity: activity as tolerated    Diet: regular diet      Discharge Medications:     Discharge Medication List as of 7/15/2020  3:51 PM           Details   aspirin 81 MG chewable tablet Take 1 tablet by mouth daily With food, Disp-30 tablet,R-[id number]              Details   famotidine (PEPCID) 20 MG tablet Take by mouthHistorical Med      cetirizine (ZYRTEC) 10 MG tablet TAKE 1 TABLET BY MOUTH EVERY DAYHistorical Med      triamterene-hydrochlorothiazide (MAXZIDE) 75-50 MG per tablet Take 1 tablet by mouth dailyHistorical Med      propranolol (INDERAL) 80 MG tablet Take 80 mg by mouth nightly Historical Med      allopurinol (ZYLOPRIM) 300 MG tablet Take 300 mg by mouth dailyHistorical Med      metFORMIN (GLUCOPHAGE) 500 MG tablet Take 500 mg by mouth daily (with breakfast) Historical Med             Time Spent on discharge is more than 30 minutes in the examination, evaluation, counseling and review of medications and discharge plan. Signed:    Adebayo Trinidad MD   7/19/2020      Thank you Tarah Allen MD for the opportunity to be involved in this patient's care. If you have any questions or concerns please feel free to contact me at [phone].

## 2020-07-15 NOTE — PROGRESS NOTES
Patient admitted/transfered to room 361 from [facility]. Phone report received. Patient oriented to room, call light, bed rails, phone, lights and bathroom. Bed locked, in lowest position, side rails up 2/4, call light within reach.

## 2020-07-15 NOTE — PROGRESS NOTES
discharge instructions given. Pt verbalized understanding denies any questions/ needs at this time. IV removed with no issues. Patient walked out to front lobby with spouse for discharge.

## 2020-07-15 NOTE — CONSULTS
In patient Neurology consult        [facility] Neurology      Morrison Ingle, MD Karlyn Simmonds  1950    Date of Service: 7/15/2020    Referring Physician: Sergey Harris MD      Reason for the consult and CC: Acute speech impairment and new stroke. HPI:   The patient is a 78y.o.  years old female with history of diabetes and hypertension was admitted to the hospital [facility] yesterday with acute speech impairment and confusion. Symptoms started few days ago. She describes sudden onset of difficulties with word findings and then dysarthria. Degree was severe and persistent till yesterday. No triggers or other associated symptoms. No weakness or numbness or chest pain. Symptoms interfere with ADL and she decided to come to the ER. No other relieving or aggravating factors. Initial work-up in the ER was CT head showed no acute strokes. She was transferred to [facility]. Today she feels back to her baseline. MRI of the brain showed no acute findings. Carotid Doppler and echo are pending. She was not taking aspirin or statin at home. Recent blood test showed elevated blood sugar level. Other review of system was unremarkable.     Family history: Noncontributory    Surgical history: Noncontributory         Past Medical History:   Diagnosis Date    Diabetes mellitus ([address]     GERD (gastroesophageal reflux disease)     Hypertension      Social History     Tobacco Use    Smoking status: Never Smoker    Smokeless tobacco: Never Used   Substance Use Topics    Alcohol use: Not Currently    Drug use: Never     Allergies   Allergen Reactions    Amoxicillin     Bactrim [Sulfamethoxazole-Trimethoprim]     Biaxin [Clarithromycin]     Calan [Verapamil]     Ciprofloxacin      Current Facility-Administered Medications   Medication Dose Route Frequency Provider Last Rate Last Dose    allopurinol (ZYLOPRIM) tablet 300 mg  300 mg Oral Daily Nini Milian MD   300 mg at 07/15/20 1011  famotidine (PEPCID) tablet 20 mg  20 mg Oral Daily Neto Lee MD   20 mg at 07/15/20 1010    propranolol (INDERAL) tablet 80 mg  80 mg Oral Nightly Neto Lee MD        triamterene-hydroCHLOROthiazide (MAXZIDE-25) 37.5-25 MG per tablet 2 tablet  2 tablet Oral Daily Neto Lee MD        sodium chloride flush 0.9 % injection 10 mL  10 mL Intravenous 2 times per day Neto Lee MD   10 mL at 07/15/20 1012    sodium chloride flush 0.9 % injection 10 mL  10 mL Intravenous PRN Neto Lee MD        acetaminophen (TYLENOL) tablet 650 mg  650 mg Oral Q6H PRN Neto Lee MD        Or   [facility] acetaminophen (TYLENOL) suppository 650 mg  650 mg Rectal Q6H PRN Neto Lee MD        polyethylene glycol (GLYCOLAX) packet 17 g  17 g Oral Daily PRN Neto Lee MD        promethazine (PHENERGAN) tablet 12.5 mg  12.5 mg Oral Q6H PRN Neto Lee MD        Or    ondansetron (ZOFRAN) injection 4 mg  4 mg Intravenous Q6H PRN Neto Lee MD        enoxaparin (LOVENOX) injection 30 mg  30 mg Subcutaneous Daily Neto Lee MD   30 mg at 07/15/20 1011    aspirin chewable tablet 81 mg  81 mg Oral Daily Neto Lee MD   81 mg at 07/15/20 1010       ROS : A 10-14 system review of constitutional, cardiovascular, respiratory, eyes, musculoskeletal, endocrine, GI, ENT, skin, hematological, genitourinary, psychiatric and neurologic systems was obtained and updated today and is unremarkable except as mentioned in my HPI      Exam:     Constitutional:   Vitals:    07/14/20 2200 07/15/20 0015 07/15/20 0415 07/15/20 1049   BP: (!) 163/102 (!) 155/82 (!) 149/87 (!) 143/87   Pulse: 76 74 58 64   Resp: 20 20 20 16   Temp: 98.8 °F (37.1 °C) 98.6 °F (37 °C) 97.6 °F (36.4 °C) 98.1 °F (36.7 °C)   TempSrc: Oral Oral Oral Oral   SpO2: 96% 97% 97% 95%   Weight: 158 lb 14.4 oz (72.1 kg)      Height: 5' (1.524 m)          General appearance: Normal development and appear in no acute distress. Eye: No icterus. Fundus: Funduscopic examination cannot be performed due to COVID19 restrictions and precautions. Neck: supple  Cardiovascular: No lower leg edema with good pulsation. Mental Status:   Oriented to person, place, problem, and time. Memory: Aware of recent and remote event. Good immediate recall. Intact remote memory  Normal attention span and concentration. Language: intact naming, repeating and fluency   Good fund of Knowledge. Aware of current events and vocabulary   Cranial Nerves:   II: Visual fields: Full. Pupils: equal, round, reactive to light  III,IV,VI: Extra Ocular Movements are intact. No nystagmus  V: Facial sensation is intact   VII: Facial strength and movements: intact and symmetric  VIII: Hearing: Intact to finger rub bilaterally  IX: Palate elevation is symmetric  XI: Shoulder shrug is intact  XII: Tongue movements are normal  Musculoskeletal: 5/5 in all 4 extremities. Tone: Normal tone. Reflexes: Bilateral biceps 2/4, triceps 2/4, brachial radialis 2/4, knee 2/4 and ankle 2/4. Planters: flexor bilaterally. Coordination: no pronator drift, no dysmetria with FNF in upper extremities. Normal REM. Sensation: normal to all modalities in both arms and legs. Gait/Posture: steady gait and normal posturing and station.      Data:  LABS:   Lab Results   Component Value Date     07/14/2020    K 3.4 07/14/2020    CL 98 07/14/2020    CO2 28 07/14/2020    BUN 21 07/14/2020    CREATININE 1.5 07/14/2020    GFRAA 41 07/14/2020    LABGLOM 34 07/14/2020    GLUCOSE 169 07/14/2020    MG 2.20 07/14/2020    CALCIUM 11.2 07/14/2020     Lab Results   Component Value Date    WBC 4.6 07/14/2020    RBC 5.45 07/14/2020    HGB 15.0 07/14/2020    HCT 45.5 07/14/2020    MCV 83.5 07/14/2020    RDW 15.0 07/14/2020     07/14/2020   No results found for: INR, PROTIME    Neuroimaging were independently reviewed by myself and discussed results with the patient  Reviewed notes from different physicians  Reviewed lab and blood testing    Impression:  Acute aphasia and speech impairment, severe. Likely transient ischemic attack. Diabetes, not controlled  Hypertension, not controlled  Hyperlipidemia    Recommendation:  Carotid Doppler  Echo  A1c  Fasting lipid panel  Telemetry  PT and OT  Speech evaluation  Aspirin. She was not taking aspirin at home  Statin  Blood pressure monitor and continue home blood pressure medications  DVT and GI prophylaxis  Long discussion with the patient today regarding stroke prevention and risk of strokes after TIA  DC planning when medically stable and follow-up with PCP regarding diabetes and hypertension control. ABCD score is 6 which carries high risk of stroke after TIA. 11-17% in the first 90 days                Thank you for referring such patient. If you have any questions regarding my consult note, please don't hesitate to call me. Justino Stover MD  [phone]    This dictation was generated by voice recognition computer software.  Although all attempts are made to edit the dictation for accuracy, there may be errors in the  transcription that are not intended

## 2020-07-15 NOTE — PROGRESS NOTES
4 Eyes Skin Assessment     The patient is being assess for  Admission    I agree that 2 RN's have performed a thorough Head to Toe Skin Assessment on the patient. ALL assessment sites listed below have been assessed. Areas assessed by both nurses:   [x]   Head, Face, and Ears   [x]   Shoulders, Back, and Chest  [x]   Arms, Elbows, and Hands   [x]   Coccyx, Sacrum, and IschIum  [x]   Legs, Feet, and Heels        Does the Patient have Skin Breakdown?   No         [place] Prevention initiated:  NA   Wound Care Orders initiated:  NA      [facility] nurse consulted for Pressure Injury (Stage 3,4, Unstageable, DTI, NWPT, and Complex wounds), New and Established Ostomies:  NA      Nurse 1 eSignature: Electronically signed by Paul Brandon RN on 7/15/20 at 3:18 AM EDT    **SHARE this note so that the co-signing nurse is able to place an eSignature**    Nurse 2 eSignature: Electronically signed by Sandro Callejas RN on 7/15/20 at 3:21 AM EDT

## 2020-07-15 NOTE — H&P
Hospital Medicine History & Physical      PCP: Braden Mera MD    Date of Admission: 7/14/2020    Date of Service: Pt seen/examined on 7/14/2020 and Placed in Observation. Chief Complaint: Transfer from [facility] ED for speech problem      History Of Present Illness:   78 y.o. female who presented to [facility] with above complaints  Patient reports she was at home earlier today, when she was found in the bathtub by her  with severe garbled speech, confusion, was unable to speak.  reported to the ER staff that patient has been having speech problems and word finding difficulty for the last 1 week. Patient remembers the incident earlier today and reports that she could not bring out the words that she wanted to. She reports symptoms got better by the time she came to the ED. she denies any history of stroke or TIA in the past.  She denied any dizziness, lightheadedness, blurring of vision, focal numbness weakness or tingling. No loss of consciousness reported. No seizure-like activity reported. Past Medical History:          Diagnosis Date    Diabetes mellitus ([facility] [address]     GERD (gastroesophageal reflux disease)     Hypertension        Past Surgical History:      No past surgical history on file. Medications Prior to Admission:      Prior to Admission medications    Medication Sig Start Date End Date Taking?  Authorizing Provider   famotidine (PEPCID) 20 MG tablet Take by mouth    Historical Provider, MD   cetirizine (ZYRTEC) 10 MG tablet TAKE 1 TABLET BY MOUTH EVERY DAY 11/26/19   Historical Provider, MD   triamterene-hydrochlorothiazide (MAXZIDE) 75-50 MG per tablet Take 1 tablet by mouth daily    Historical Provider, MD   propranolol (INDERAL) 80 MG tablet Take 80 mg by mouth nightly     Historical Provider, MD   allopurinol (ZYLOPRIM) 300 MG tablet Take 300 mg by mouth daily    Historical Provider, MD   metFORMIN (GLUCOPHAGE) 500 MG tablet Take 500 mg by mouth daily (with equal bilaterally       Labs:     Recent Labs     07/14/20  1507   WBC 4.6   HGB 15.0   HCT 45.5        Recent Labs     07/14/20  1507      K 3.4*   CL 98*   CO2 28   BUN 21*   CREATININE 1.5*   CALCIUM 11.2*     Recent Labs     07/14/20  1507   AST 37   ALT 27   BILITOT 0.8   ALKPHOS 49     No results for input(s): INR in the last 72 hours. Recent Labs     07/14/20  1507   TROPONINI <0.01       Urinalysis:      Lab Results   Component Value Date    NITRU Negative 07/14/2020    WBCUA 3-5 07/14/2020    BACTERIA 1+ 07/14/2020    RBCUA 0-2 07/14/2020    BLOODU Negative 07/14/2020    SPECGRAV 1.020 07/14/2020    GLUCOSEU 250 07/14/2020       Radiology:   I reviewed the chest x-ray, EKG and CT head personally  CXR: I have reviewed the CXR with the following interpretation: Left basilar atelectasis  EKG:  I have reviewed the EKG with the following interpretation: Sinus tachycardia    CT head  Impression    No acute intracranial abnormality.         Chronic small ischemic disease and age involutional change.                  MRI Brain WO Contrast    (Results Pending)   VL DUP CAROTID BILATERAL    (Results Pending)       ASSESSMENT:PLAN:    Active Hospital Problems    Diagnosis Date Noted    TIA (transient ischemic attack) [G45.9] 07/14/2020    Hypertriglyceridemia [E78.1] 05/18/2016    Essential hypertension with goal blood pressure less than 140/90 [I10] 05/18/2016    Diabetes mellitus with neuropathy ([facility] [address] [E11.40] 07/21/2015     Transient and recurrent speech deficit  Likely TIA. Cannot rule out mild stroke. Symptoms on and off for the last 1 week.   Initial CT head normal.   - Other DDx  including heme/metabolic derangements, encephalopathy, seizures, migraines, syncope less likely  - Neuroimaging -  MRI brain w/o contrast, carotid doppler, 2D ECHO ordered as indicated  - On ASA 81 mg  - EKG + Telemetry (review for Afib or other suggestion of cardiac embolic source)  - Aggressive risk factor management of HTN, DM, HLD, obesity  - f/u labs including lipid profile, A1C in a.m.  -Neurology consulted    Hyperlipidemia  -FLP in a.m. initiate statin based on results    Hypertension  -Not at goal 140/90. Home med Maxzide, propranolol resumed. May need dose titration to achieve goal BP which is better done in outpatient setting    DM2  -controlled, Metformin held. Placed on low-dose sliding scale insulin. A1c pending    Obesity  BMI 31.03. Counseled on weight loss and exercise. DVT Prophylaxis: Lovenox  Diet: DIET GENERAL;  Code Status: Full Code    PT/OT Eval Status: Ambulatory    Dispo -observation       Mary Villa MD    Thank you Shania Linda MD for the opportunity to be involved in this patient's care. If you have any questions or concerns please feel free to contact me at [phone].

## 2020-07-15 NOTE — PROGRESS NOTES
Report called to Khalida Wen RN at [facility]. Report to Life squad. Pt transferred  to [facility] for Neurology.

## 2020-07-16 LAB
ESTIMATED AVERAGE GLUCOSE: 151.3 MG/DL
HBA1C MFR BLD: 6.9 %

## 2020-11-03 PROBLEM — I10 HTN (HYPERTENSION), BENIGN: Status: RESOLVED | Noted: 2020-11-03 | Resolved: 2020-11-03

## 2021-03-16 ENCOUNTER — APPOINTMENT (OUTPATIENT)
Dept: CT IMAGING | Age: 71
DRG: 065 | End: 2021-03-16
Payer: COMMERCIAL

## 2021-03-16 ENCOUNTER — APPOINTMENT (OUTPATIENT)
Dept: GENERAL RADIOLOGY | Age: 71
DRG: 065 | End: 2021-03-16
Payer: COMMERCIAL

## 2021-03-16 ENCOUNTER — HOSPITAL ENCOUNTER (INPATIENT)
Age: 71
LOS: 2 days | Discharge: NURSING, REHAB OR LONG-TERM CARE | DRG: 065 | End: 2021-03-19
Attending: EMERGENCY MEDICINE | Admitting: INTERNAL MEDICINE
Payer: COMMERCIAL

## 2021-03-16 ENCOUNTER — APPOINTMENT (OUTPATIENT)
Dept: MRI IMAGING | Age: 71
DRG: 065 | End: 2021-03-16
Payer: COMMERCIAL

## 2021-03-16 DIAGNOSIS — I63.9 CEREBROVASCULAR ACCIDENT (CVA), UNSPECIFIED MECHANISM (HCC): Primary | ICD-10-CM

## 2021-03-16 DIAGNOSIS — R73.9 HYPERGLYCEMIA: ICD-10-CM

## 2021-03-16 PROBLEM — R53.1 ACUTE LEFT-SIDED WEAKNESS: Status: ACTIVE | Noted: 2021-03-16

## 2021-03-16 LAB
A/G RATIO: 1.9 (ref 1.1–2.2)
ALBUMIN SERPL-MCNC: 5.1 G/DL (ref 3.4–5)
ALP BLD-CCNC: 67 U/L (ref 40–129)
ALT SERPL-CCNC: 40 U/L (ref 10–40)
ANION GAP SERPL CALCULATED.3IONS-SCNC: 10 MMOL/L (ref 3–16)
AST SERPL-CCNC: 44 U/L (ref 15–37)
BACTERIA: ABNORMAL /HPF
BASOPHILS ABSOLUTE: 0.1 K/UL (ref 0–0.2)
BASOPHILS RELATIVE PERCENT: 1.3 %
BILIRUB SERPL-MCNC: 1.1 MG/DL (ref 0–1)
BILIRUBIN URINE: NEGATIVE
BLOOD, URINE: ABNORMAL
BUN BLDV-MCNC: 17 MG/DL (ref 7–20)
CALCIUM SERPL-MCNC: 11.4 MG/DL (ref 8.3–10.6)
CHLORIDE BLD-SCNC: 99 MMOL/L (ref 99–110)
CLARITY: CLEAR
CO2: 30 MMOL/L (ref 21–32)
COLOR: YELLOW
CREAT SERPL-MCNC: 1.1 MG/DL (ref 0.6–1.2)
EKG ATRIAL RATE: 73 BPM
EKG DIAGNOSIS: NORMAL
EKG P AXIS: 78 DEGREES
EKG P-R INTERVAL: 182 MS
EKG Q-T INTERVAL: 400 MS
EKG QRS DURATION: 84 MS
EKG QTC CALCULATION (BAZETT): 440 MS
EKG R AXIS: 14 DEGREES
EKG T AXIS: 49 DEGREES
EKG VENTRICULAR RATE: 73 BPM
EOSINOPHILS ABSOLUTE: 0.2 K/UL (ref 0–0.6)
EOSINOPHILS RELATIVE PERCENT: 2.4 %
EPITHELIAL CELLS, UA: ABNORMAL /HPF (ref 0–5)
GFR AFRICAN AMERICAN: 59
GFR NON-AFRICAN AMERICAN: 49
GLOBULIN: 2.7 G/DL
GLUCOSE BLD-MCNC: 176 MG/DL (ref 70–99)
GLUCOSE BLD-MCNC: 193 MG/DL (ref 70–99)
GLUCOSE BLD-MCNC: 282 MG/DL (ref 70–99)
GLUCOSE BLD-MCNC: 287 MG/DL (ref 70–99)
GLUCOSE URINE: >=1000 MG/DL
HCT VFR BLD CALC: 47.3 % (ref 36–48)
HEMOGLOBIN: 16.5 G/DL (ref 12–16)
INR BLD: 0.88 (ref 0.86–1.14)
KETONES, URINE: NEGATIVE MG/DL
LEUKOCYTE ESTERASE, URINE: NEGATIVE
LYMPHOCYTES ABSOLUTE: 1.5 K/UL (ref 1–5.1)
LYMPHOCYTES RELATIVE PERCENT: 22.1 %
MCH RBC QN AUTO: 28.5 PG (ref 26–34)
MCHC RBC AUTO-ENTMCNC: 34.8 G/DL (ref 31–36)
MCV RBC AUTO: 81.9 FL (ref 80–100)
MICROSCOPIC EXAMINATION: YES
MONOCYTES ABSOLUTE: 0.4 K/UL (ref 0–1.3)
MONOCYTES RELATIVE PERCENT: 5.8 %
NEUTROPHILS ABSOLUTE: 4.5 K/UL (ref 1.7–7.7)
NEUTROPHILS RELATIVE PERCENT: 68.4 %
NITRITE, URINE: NEGATIVE
PDW BLD-RTO: 14.1 % (ref 12.4–15.4)
PERFORMED ON: ABNORMAL
PH UA: 8 (ref 5–8)
PLATELET # BLD: 133 K/UL (ref 135–450)
PMV BLD AUTO: 7 FL (ref 5–10.5)
POTASSIUM SERPL-SCNC: 3.6 MMOL/L (ref 3.5–5.1)
PROTEIN UA: 100 MG/DL
PROTHROMBIN TIME: 10.2 SEC (ref 10–13.2)
RBC # BLD: 5.78 M/UL (ref 4–5.2)
RBC UA: ABNORMAL /HPF (ref 0–4)
SODIUM BLD-SCNC: 139 MMOL/L (ref 136–145)
SPECIFIC GRAVITY UA: 1.01 (ref 1–1.03)
TOTAL PROTEIN: 7.8 G/DL (ref 6.4–8.2)
TROPONIN: <0.01 NG/ML
URINE REFLEX TO CULTURE: ABNORMAL
URINE TYPE: ABNORMAL
UROBILINOGEN, URINE: 0.2 E.U./DL
WBC # BLD: 6.6 K/UL (ref 4–11)
WBC UA: ABNORMAL /HPF (ref 0–5)

## 2021-03-16 PROCEDURE — 81001 URINALYSIS AUTO W/SCOPE: CPT

## 2021-03-16 PROCEDURE — 71045 X-RAY EXAM CHEST 1 VIEW: CPT

## 2021-03-16 PROCEDURE — G0378 HOSPITAL OBSERVATION PER HR: HCPCS

## 2021-03-16 PROCEDURE — 70450 CT HEAD/BRAIN W/O DYE: CPT

## 2021-03-16 PROCEDURE — 93005 ELECTROCARDIOGRAM TRACING: CPT | Performed by: EMERGENCY MEDICINE

## 2021-03-16 PROCEDURE — 6360000002 HC RX W HCPCS: Performed by: NURSE PRACTITIONER

## 2021-03-16 PROCEDURE — 6370000000 HC RX 637 (ALT 250 FOR IP): Performed by: NURSE PRACTITIONER

## 2021-03-16 PROCEDURE — 97530 THERAPEUTIC ACTIVITIES: CPT

## 2021-03-16 PROCEDURE — 97166 OT EVAL MOD COMPLEX 45 MIN: CPT

## 2021-03-16 PROCEDURE — 85025 COMPLETE CBC W/AUTO DIFF WBC: CPT

## 2021-03-16 PROCEDURE — 70496 CT ANGIOGRAPHY HEAD: CPT

## 2021-03-16 PROCEDURE — 96372 THER/PROPH/DIAG INJ SC/IM: CPT

## 2021-03-16 PROCEDURE — 84484 ASSAY OF TROPONIN QUANT: CPT

## 2021-03-16 PROCEDURE — 80053 COMPREHEN METABOLIC PANEL: CPT

## 2021-03-16 PROCEDURE — 93010 ELECTROCARDIOGRAM REPORT: CPT | Performed by: INTERNAL MEDICINE

## 2021-03-16 PROCEDURE — 99285 EMERGENCY DEPT VISIT HI MDM: CPT

## 2021-03-16 PROCEDURE — 85610 PROTHROMBIN TIME: CPT

## 2021-03-16 PROCEDURE — 6360000004 HC RX CONTRAST MEDICATION: Performed by: EMERGENCY MEDICINE

## 2021-03-16 PROCEDURE — 70551 MRI BRAIN STEM W/O DYE: CPT

## 2021-03-16 PROCEDURE — 97112 NEUROMUSCULAR REEDUCATION: CPT

## 2021-03-16 RX ORDER — SODIUM CHLORIDE 0.9 % (FLUSH) 0.9 %
10 SYRINGE (ML) INJECTION PRN
Status: DISCONTINUED | OUTPATIENT
Start: 2021-03-16 | End: 2021-03-19 | Stop reason: HOSPADM

## 2021-03-16 RX ORDER — CYCLOSPORINE 0.5 MG/ML
EMULSION OPHTHALMIC PRN
COMMUNITY

## 2021-03-16 RX ORDER — CETIRIZINE HYDROCHLORIDE 10 MG/1
10 TABLET ORAL DAILY
Status: DISCONTINUED | OUTPATIENT
Start: 2021-03-16 | End: 2021-03-19 | Stop reason: HOSPADM

## 2021-03-16 RX ORDER — SODIUM CHLORIDE 0.9 % (FLUSH) 0.9 %
10 SYRINGE (ML) INJECTION EVERY 12 HOURS SCHEDULED
Status: DISCONTINUED | OUTPATIENT
Start: 2021-03-16 | End: 2021-03-19 | Stop reason: HOSPADM

## 2021-03-16 RX ORDER — POLYETHYLENE GLYCOL 3350 17 G/17G
17 POWDER, FOR SOLUTION ORAL DAILY PRN
Status: DISCONTINUED | OUTPATIENT
Start: 2021-03-16 | End: 2021-03-19 | Stop reason: HOSPADM

## 2021-03-16 RX ORDER — ASPIRIN 81 MG/1
81 TABLET, CHEWABLE ORAL DAILY
Status: DISCONTINUED | OUTPATIENT
Start: 2021-03-16 | End: 2021-03-19 | Stop reason: HOSPADM

## 2021-03-16 RX ORDER — PROMETHAZINE HYDROCHLORIDE 25 MG/1
12.5 TABLET ORAL EVERY 6 HOURS PRN
Status: DISCONTINUED | OUTPATIENT
Start: 2021-03-16 | End: 2021-03-19 | Stop reason: HOSPADM

## 2021-03-16 RX ORDER — ONDANSETRON 2 MG/ML
4 INJECTION INTRAMUSCULAR; INTRAVENOUS EVERY 6 HOURS PRN
Status: DISCONTINUED | OUTPATIENT
Start: 2021-03-16 | End: 2021-03-19 | Stop reason: HOSPADM

## 2021-03-16 RX ORDER — DEXTROSE MONOHYDRATE 50 MG/ML
100 INJECTION, SOLUTION INTRAVENOUS PRN
Status: DISCONTINUED | OUTPATIENT
Start: 2021-03-16 | End: 2021-03-19 | Stop reason: HOSPADM

## 2021-03-16 RX ORDER — AMLODIPINE BESYLATE 5 MG/1
5 TABLET ORAL DAILY
Status: ON HOLD | COMMUNITY
End: 2021-04-09 | Stop reason: HOSPADM

## 2021-03-16 RX ORDER — NICOTINE POLACRILEX 4 MG
15 LOZENGE BUCCAL PRN
Status: DISCONTINUED | OUTPATIENT
Start: 2021-03-16 | End: 2021-03-19 | Stop reason: HOSPADM

## 2021-03-16 RX ORDER — ATORVASTATIN CALCIUM 80 MG/1
80 TABLET, FILM COATED ORAL NIGHTLY
Status: DISCONTINUED | OUTPATIENT
Start: 2021-03-16 | End: 2021-03-19 | Stop reason: HOSPADM

## 2021-03-16 RX ORDER — LABETALOL HYDROCHLORIDE 5 MG/ML
10 INJECTION, SOLUTION INTRAVENOUS EVERY 10 MIN PRN
Status: DISCONTINUED | OUTPATIENT
Start: 2021-03-16 | End: 2021-03-19 | Stop reason: HOSPADM

## 2021-03-16 RX ORDER — DEXTROSE MONOHYDRATE 25 G/50ML
12.5 INJECTION, SOLUTION INTRAVENOUS PRN
Status: DISCONTINUED | OUTPATIENT
Start: 2021-03-16 | End: 2021-03-19 | Stop reason: HOSPADM

## 2021-03-16 RX ORDER — ALLOPURINOL 300 MG/1
300 TABLET ORAL DAILY
Status: DISCONTINUED | OUTPATIENT
Start: 2021-03-16 | End: 2021-03-19 | Stop reason: HOSPADM

## 2021-03-16 RX ADMIN — INSULIN LISPRO 2 UNITS: 100 INJECTION, SOLUTION INTRAVENOUS; SUBCUTANEOUS at 18:01

## 2021-03-16 RX ADMIN — ASPIRIN 81 MG: 81 TABLET, CHEWABLE ORAL at 14:11

## 2021-03-16 RX ADMIN — ENOXAPARIN SODIUM 40 MG: 40 INJECTION SUBCUTANEOUS at 14:10

## 2021-03-16 RX ADMIN — INSULIN LISPRO 1 UNITS: 100 INJECTION, SOLUTION INTRAVENOUS; SUBCUTANEOUS at 21:26

## 2021-03-16 RX ADMIN — ALLOPURINOL 300 MG: 300 TABLET ORAL at 14:11

## 2021-03-16 RX ADMIN — IOPAMIDOL 75 ML: 755 INJECTION, SOLUTION INTRAVENOUS at 10:33

## 2021-03-16 RX ADMIN — ATORVASTATIN CALCIUM 80 MG: 80 TABLET, FILM COATED ORAL at 21:26

## 2021-03-16 RX ADMIN — CETIRIZINE HYDROCHLORIDE 10 MG: 10 TABLET, FILM COATED ORAL at 14:11

## 2021-03-16 ASSESSMENT — ENCOUNTER SYMPTOMS
RHINORRHEA: 0
VOMITING: 0
SINUS PRESSURE: 0
NAUSEA: 0
PHOTOPHOBIA: 0
RECTAL PAIN: 0
TROUBLE SWALLOWING: 0
BLOOD IN STOOL: 0
CONSTIPATION: 0
DIARRHEA: 0
COLOR CHANGE: 0
EYE PAIN: 0
ABDOMINAL DISTENTION: 0
EYE REDNESS: 0
CHEST TIGHTNESS: 0
EYE DISCHARGE: 0
COUGH: 0
SHORTNESS OF BREATH: 0
BACK PAIN: 0
SORE THROAT: 0
APNEA: 0
VOICE CHANGE: 0
STRIDOR: 0
WHEEZING: 0
ABDOMINAL PAIN: 0

## 2021-03-16 ASSESSMENT — PAIN SCALES - GENERAL: PAINLEVEL_OUTOF10: 0

## 2021-03-16 NOTE — ED NOTES
Pt's inner labia reddened, coccyx noted to be red, no skin open at this time.        Lia Patel RN  03/16/21 9018

## 2021-03-16 NOTE — ED NOTES
200 - Dr Luna Vásquez activated pt as code stroke, announced, 2990 Disrupt CK notified  200 - called  Stroke Team  1017 - called lab to be on the lookout for pt's blood  1019 - Stroke team fellow (Working with Dr Man Segura and Dr Shilpi Mohan) called back to speak with Dr Eddie Sanchez  03/16/21 1020

## 2021-03-16 NOTE — PROGRESS NOTES
Occupational Therapy   Occupational Therapy Initial Assessment  Date: 3/16/2021   Patient Name: Janette Ames  MRN: [id number]     : 1950    Date of Service: 3/16/2021    Discharge Recommendations:  IP Rehab       Assessment   Performance deficits / Impairments: Decreased functional mobility ; Decreased balance;Decreased ADL status; Decreased sensation;Decreased strength;Decreased high-level IADLs  Assessment: pt normally independent with high level IADL's & functional mobility without AD; now presents with Left hemipares, requiring max assist with ADL's & mod assist with bed mobility; pt to benefit from skilled OT services while in acute care setting  OT Education: OT Role;Plan of Care;Precautions  Patient Education: disease specific:  importance of use of RED/nurse call light for assist with transfers/ADL needs  REQUIRES OT FOLLOW UP: Yes  Activity Tolerance  Activity Tolerance: Patient Tolerated treatment well  Safety Devices  Safety Devices in place: Yes  Type of devices: Bed alarm in place;Call light within reach; Left in bed;Nurse notified           Patient Diagnosis(es): The primary encounter diagnosis was Cerebrovascular accident (CVA), unspecified mechanism ([address] A diagnosis of Hyperglycemia was also pertinent to this visit. has a past medical history of Diabetes mellitus ([address] GERD (gastroesophageal reflux disease), and Hypertension. has no past surgical history on file.            Restrictions  Restrictions/Precautions  Restrictions/Precautions: General Precautions, Fall Risk  Position Activity Restriction  Other position/activity restrictions: Purewick, telemetry    Subjective   General  Chart Reviewed: Yes  Patient assessed for rehabilitation services?: Yes  Family / Caregiver Present: Yes(spouse)  Referring Practitioner: RAJESH Morfin CNP  Diagnosis: CVA with Left hemiparesis    Social/Functional History  Social/Functional History  Lives With: Spouse(works from home & \"6 dogs\")  Type of Home: House  Home Layout: One level  Home Access: Ramped entrance  Bathroom Shower/Tub: Tub/Shower unit, Shower chair with back  Bathroom Toilet: [address]: (transport w/c; adjustable bed)  ADL Assistance: Independent  Homemaking Responsibilities: Yes  Meal Prep Responsibility: Primary  Laundry Responsibility: Primary  Cleaning Responsibility: Primary  Shopping Responsibility: Primary  Ambulation Assistance: Independent(without AD)  Transfer Assistance: Independent  Active : Yes  Occupation: Retired  Type of occupation: marketing research,   Leisure & Hobbies: read on my [address], card games on [place]: Impaired  Vision Exceptions: Wears glasses at all times  Hearing: Within functional limits          Balance  Sitting Balance: Minimal assistance(to CGA EOB for 10 minutes)  Standing Balance: Moderate assistance(static standing with use of bedrail on Right for support, unable to wt. shift to RIght)  ADL  Toileting: Dependent/Total(Purewick)      RUE Tone: Normal  Tone LUE  LUE Tone: Hypotonic       Bed mobility  Supine to Sit: Moderate assistance(log roll to RIght)  Sit to Supine: Moderate assistance  Scooting: Moderate assistance  Transfers  Sit to stand:  Moderate assistance  Stand to sit: Minimal assistance     Vision - Basic Assessment  Prior Vision: Wears glasses only for reading     Cognition  Overall Cognitive Status: WFL        Sensation  Overall Sensation Status: Impaired  Light Touch: Partial deficits in the LUE      LUE General AROM: trace scapular retraction/protraction      RUE AROM : WFL        Plan   Plan  Times per week: 3-5 x/ week  Current Treatment Recommendations: Balance Training, Functional Mobility Training, Safety Education & Training, Positioning, Neuromuscular Re-education, Self-Care / ADL      AM-PAC Score        AM-PAC Inpatient Daily Activity Raw Score: 11 (03/16/21 1503)  AM-PAC Inpatient ADL T-Scale Score : 29.04 (03/16/21 1503) ADL Inpatient CMS 0-100% Score: 70.42 (03/16/21 1503)  ADL Inpatient CMS G-Code Modifier : CL (03/16/21 1503)    Goals  Short term goals  Time Frame for Short term goals: 1 week(3-23-21)  Short term goal 1: mod assist of 1 with stand-pivot transfers by 3-23-21  Short term goal 2: min assist with UE light ADL's  Short term goal 3: min assist with LUE self ROM/positioning  Short term goal 4: mod assist with toileting hygiene by 3-21-21  Patient Goals   Patient goals : go home when able       Therapy Time   Individual Concurrent Group Co-treatment   Time In 1335         Time Out 1410         Minutes [address]

## 2021-03-16 NOTE — ED PROVIDER NOTES
 Years of education: Not on file    Highest education level: Not on file   Occupational History    Not on file   Social Needs    Financial resource strain: Not on file    Food insecurity     Worry: Not on file     Inability: Not on file    Transportation needs     Medical: Not on file     Non-medical: Not on file   Tobacco Use    Smoking status: Never Smoker    Smokeless tobacco: Never Used   Substance and Sexual Activity    Alcohol use: Not Currently    Drug use: Never    Sexual activity: Not Currently   Lifestyle    Physical activity     Days per week: Not on file     Minutes per session: Not on file    Stress: Not on file   Relationships    Social connections     Talks on phone: Not on file     Gets together: Not on file     Attends Evangelical service: Not on file     Active member of club or organization: Not on file     Attends meetings of clubs or organizations: Not on file     Relationship status: Not on file    Intimate partner violence     Fear of current or ex partner: Not on file     Emotionally abused: Not on file     Physically abused: Not on file     Forced sexual activity: Not on file   Other Topics Concern    Not on file   Social History Narrative    Not on file         Review of Systems   Constitutional: Negative for activity change, appetite change, chills, diaphoresis, fatigue, fever and unexpected weight change. HENT: Negative for congestion, ear pain, mouth sores, rhinorrhea, sinus pressure, sore throat, tinnitus, trouble swallowing and voice change. Eyes: Negative for photophobia, pain, discharge, redness and visual disturbance. Respiratory: Negative for apnea, cough, chest tightness, shortness of breath, wheezing and stridor. Cardiovascular: Negative for chest pain, palpitations and leg swelling. Gastrointestinal: Negative for abdominal distention, abdominal pain, blood in stool, constipation, diarrhea, nausea, rectal pain and vomiting.    Genitourinary: Negative for difficulty urinating, dyspareunia, dysuria, flank pain, frequency, genital sores, menstrual problem, pelvic pain, urgency, vaginal bleeding, vaginal discharge and vaginal pain. Musculoskeletal: Positive for gait problem. Negative for arthralgias, back pain, joint swelling, neck pain and neck stiffness. Skin: Negative for color change and rash. Neurological: Positive for weakness. Negative for dizziness, tremors, seizures, syncope, facial asymmetry, speech difficulty, light-headedness, numbness and headaches. LUE and LLE   Hematological: Negative for adenopathy. Does not bruise/bleed easily. Psychiatric/Behavioral: Negative for agitation, confusion, dysphoric mood, hallucinations, self-injury, sleep disturbance and suicidal ideas. All other systems reviewed and are negative. Physical Exam  Constitutional:       General: She is not in acute distress. Appearance: She is well-developed. HENT:      Head: Normocephalic and atraumatic. Left Ear: External ear normal.      Mouth/Throat:      Pharynx: No oropharyngeal exudate. Eyes:      General:         Right eye: No discharge. Left eye: No discharge. Conjunctiva/sclera: Conjunctivae normal.      Pupils: Pupils are equal, round, and reactive to light. Neck:      Musculoskeletal: Normal range of motion. Vascular: No JVD. Trachea: No tracheal deviation. Cardiovascular:      Rate and Rhythm: Normal rate and regular rhythm. Heart sounds: Normal heart sounds. No murmur. No friction rub. No gallop. Pulmonary:      Effort: Pulmonary effort is normal. No respiratory distress. Breath sounds: Normal breath sounds. No stridor. No wheezing or rales. Chest:      Chest wall: No tenderness. Abdominal:      General: Bowel sounds are normal. There is no distension. Palpations: Abdomen is soft. There is no mass. Tenderness: There is no abdominal tenderness. There is no guarding or rebound. Musculoskeletal: Normal range of motion. General: No tenderness. Lymphadenopathy:      Cervical: No cervical adenopathy. Skin:     Findings: No rash. Neurological:      Mental Status: She is alert and oriented to person, place, and time. GCS: GCS eye subscore is 4. GCS verbal subscore is 5. GCS motor subscore is 6. Cranial Nerves: Facial asymmetry (left facial droop; no sensory deficit) present. No cranial nerve deficit. Sensory: Sensation is intact. Motor: Weakness (LUE and LLE) present. No abnormal muscle tone. Coordination: Coordination abnormal.      Gait: Gait abnormal.      Deep Tendon Reflexes: Reflexes normal. Babinski sign absent on the right side. Babinski sign absent on the left side. Reflex Scores:       Bicep reflexes are 2+ on the right side and 1+ on the left side. Patellar reflexes are 2+ on the right side and 1+ on the left side. Comments: NIH stroke score is 9.0, significant for:  Left facial droop involving eye and corner of the mouth. LUE weakness of hand , wrist extension, and biceps. LLE weakness all major muscle groups. No facial, extremity numbness reported. Ataxia LUE and LLE. Psychiatric:         Behavior: Behavior normal.         Thought Content:  Thought content normal.         Judgment: Judgment normal.          Procedures     MDM   Results for orders placed or performed during the hospital encounter of 03/16/21   CBC auto differential   Result Value Ref Range    WBC 6.6 4.0 - 11.0 K/uL    RBC 5.78 (H) 4.00 - 5.20 M/uL    Hemoglobin 16.5 (H) 12.0 - 16.0 g/dL    Hematocrit 47.3 36.0 - 48.0 %    MCV 81.9 80.0 - 100.0 fL    MCH 28.5 26.0 - 34.0 pg    MCHC 34.8 31.0 - 36.0 g/dL    RDW 14.1 12.4 - 15.4 %    Platelets 945 (L) 415 - 450 K/uL    MPV 7.0 5.0 - 10.5 fL    Neutrophils % 68.4 %    Lymphocytes % 22.1 %    Monocytes % 5.8 %    Eosinophils % 2.4 %    Basophils % 1.3 %    Neutrophils Absolute 4.5 1.7 - 7.7 K/uL Lymphocytes Absolute 1.5 1.0 - 5.1 K/uL    Monocytes Absolute 0.4 0.0 - 1.3 K/uL    Eosinophils Absolute 0.2 0.0 - 0.6 K/uL    Basophils Absolute 0.1 0.0 - 0.2 K/uL   Comprehensive metabolic panel   Result Value Ref Range    Sodium 139 136 - 145 mmol/L    Potassium 3.6 3.5 - 5.1 mmol/L    Chloride 99 99 - 110 mmol/L    CO2 30 21 - 32 mmol/L    Anion Gap 10 3 - 16    Glucose 287 (H) 70 - 99 mg/dL    BUN 17 7 - 20 mg/dL    CREATININE 1.1 0.6 - 1.2 mg/dL    GFR Non- 49 (A) >60    GFR  59 (A) >60    Calcium 11.4 (H) 8.3 - 10.6 mg/dL    Total Protein 7.8 6.4 - 8.2 g/dL    Albumin 5.1 (H) 3.4 - 5.0 g/dL    Albumin/Globulin Ratio 1.9 1.1 - 2.2    Total Bilirubin 1.1 (H) 0.0 - 1.0 mg/dL    Alkaline Phosphatase 67 40 - 129 U/L    ALT 40 10 - 40 U/L    AST 44 (H) 15 - 37 U/L    Globulin 2.7 g/dL   Troponin   Result Value Ref Range    Troponin <0.01 <0.01 ng/mL   Protime-INR   Result Value Ref Range    Protime 10.2 10.0 - 13.2 sec    INR 0.88 0.86 - 1.14   POCT Glucose   Result Value Ref Range    POC Glucose 282 (H) 70 - 99 mg/dl    Performed on ACCU-CHEK    EKG 12 Lead   Result Value Ref Range    Ventricular Rate 73 BPM    Atrial Rate 73 BPM    P-R Interval 182 ms    QRS Duration 84 ms    Q-T Interval 400 ms    QTc Calculation (Bazett) 440 ms    P Axis 78 degrees    R Axis 14 degrees    T Axis 49 degrees    Diagnosis       Normal sinus rhythmNormal ECGWhen compared with ECG of 14-JUL-2020 15:31,Non-specific change in ST segment in Inferior leads       I spoke with the hospitalist physician. We thoroughly discussed the history, physical exam, laboratory and imaging studies, as well as, emergency department course. Based upon that discussion, we've decided to admit Roman Jaimes for further observation and evaluation of Balbina Hal CVA-like symptoms.   As I have deemed necessary from their history, physical and studies, I have considered and evaluated Roman Jaimes for the following diagnoses:  DIABETES, INTRACRANIAL HEMORRHAGE, MENINGITIS, SUBARACHNOID HEMORRHAGE, SUBDURAL HEMATOMA, & STROKE. FINAL IMPRESSION  1. Cerebrovascular accident (CVA), unspecified mechanism ([address]    2. Hyperglycemia        Vitals:  Blood pressure (!) 142/84, pulse 71, temperature 98.2 °F (36.8 °C), temperature source Oral, resp. rate 16, height 5' (1.524 m), weight 162 lb (73.5 kg), SpO2 100 %. Radiology  Ct Head Wo Contrast    Result Date: 3/16/2021  1. No definite evidence of acute intracranial abnormality. 2. Tiny focus of low attenuation near head of right caudate nucleus. Findings suggest presence of tiny remote lacunar infarct which is a new finding when compared to 07/14/2020 as described above. Critical results were called by Dr. Marga Salsa. Jesu Murcia MD to Roger Darien on 3/16/2021 at 10:46. Xr Chest Portable    Result Date: 3/16/2021  No acute cardiopulmonary pathology. EKG Interpretation. The Ekg interpreted by me in the absence of a cardiologist shows. normal sinus rhythm with a rate of 73  Axis is   Normal  QTc is  within an acceptable range  Intervals and Durations are unremarkable. No specific ST-T wave changes appreciated. No evidence of acute ischemia. No significant change from prior EKG dated 14 July 2020    10:20 am Consultation with  Stroke Team, Dr. Haley Garcia and Iman Donnelly. They do not recommend TPA at this time due to the duration of her symptoms. Agree with stroke evaluation and admission. 10:55 am I again spoke with the [facility] Stroke Team physician. She has reviewed the CT non-contrast and CTA head/neck. She does not recommend any acute intervention, but does recommend admission for completion of stroke evaluation. The total Critical Care time is 30 minutes which excludes separately billable procedures. 11:00 am Repeat NIH stroke score, and it is unchanged.       Pao Lee MD  03/16/21 1633

## 2021-03-16 NOTE — ED NOTES
1130 - PS to hospitalists  Re: Admission  1156 - NP Heber Pompa put in admission orders at this time     Chaka Fuentes  03/16/21 1201

## 2021-03-16 NOTE — H&P
[clarithromycin], Calan [verapamil], and Ciprofloxacin    Social History:      The patient currently lives with her . TOBACCO:   reports that she has never smoked. She has never used smokeless tobacco.  ETOH:   reports previous alcohol use. E-Cigarettes/Vaping Use     Questions Responses    E-Cigarette/Vaping Use Never User    Start Date     Passive Exposure     Quit Date     Counseling Given     Comments             Family History:      Reviewed in detail. Positive as follows:    History reviewed. No pertinent family history. REVIEW OF SYSTEMS:   Pertinent positives as noted in the HPI. All other systems reviewed and negative. PHYSICAL EXAM PERFORMED:    BP (!) 142/79   Pulse 62   Temp 97.9 °F (36.6 °C) (Oral)   Resp 16   Ht 5' (1.524 m)   Wt 162 lb (73.5 kg)   SpO2 97%   BMI 31.64 kg/m²     General appearance:  No apparent distress, appears stated age and cooperative. HEENT:  Normal cephalic, atraumatic without obvious deformity. Pupils equal, round, and reactive to light. Extra ocular muscles intact. Conjunctivae/corneas clear. Neck: Supple, with full range of motion. No jugular venous distention. Trachea midline. Respiratory:  Normal respiratory effort. Clear to auscultation, bilaterally without Rales/Wheezes/Rhonchi. Cardiovascular:  Regular rate and rhythm with normal S1/S2 without murmurs, rubs or gallops. Abdomen: Soft, non-tender, non-distended with normal bowel sounds. Musculoskeletal:  No clubbing, cyanosis or edema bilaterally. Full range of motion without deformity. Skin: Skin color, texture, turgor normal.  No rashes or lesions. Neurologic:  Left arm 1/5. Unable to shrug left shoulder. Left leg 2/5. Slight left facial droop.     Psychiatric:  Alert and oriented, thought content appropriate, normal insight  Capillary Refill: Brisk,< 3 seconds   Peripheral Pulses: +2 palpable, equal bilaterally       Labs:     Recent Labs     03/16/21  1018   WBC 6.6   HGB 16.5*   HCT 47.3   *     Recent Labs     03/16/21  1018      K 3.6   CL 99   CO2 30   BUN 17   CREATININE 1.1   CALCIUM 11.4*     Recent Labs     03/16/21  1018   AST 44*   ALT 40   BILITOT 1.1*   ALKPHOS 67     Recent Labs     03/16/21  1018   INR 0.88     Recent Labs     03/16/21  1018   TROPONINI <0.01       Urinalysis:      Lab Results   Component Value Date    NITRU Negative 03/16/2021    WBCUA 3-5 03/16/2021    BACTERIA 1+ 03/16/2021    RBCUA 3-4 03/16/2021    BLOODU TRACE-INTACT 03/16/2021    SPECGRAV 1.015 03/16/2021    GLUCOSEU >=1000 03/16/2021       Radiology:  CXR: I have reviewed the CXR with the following interpretation: No acute cardiopulmonary disease. EKG:  I have reviewed the EKG with the following interpretation: NSR, rate 73, no acute ST changes. XR CHEST PORTABLE   Final Result   No acute cardiopulmonary pathology. CTA HEAD NECK W CONTRAST   Final Result   Partial occlusion of a M3 branch of the right MCA      50% stenosis in the distal A2 segment of the right CORINE. 70% stenosis in distal A2 segment of the left CORINE. 75% stenosis in the proximal V4 segment of the right vertebral artery. 50% stenosis in the distal P2 segment of the left PCA. 70% stenosis in the P3 segment of the right PCA. No acute abnormality or flow-limiting stenosis of the major arteries of the   neck. Beading of the bilateral cervical internal carotid arteries and vertebral   arteries, likely related to mild fibromuscular dysplasia. CT HEAD WO CONTRAST   Preliminary Result   1. No definite evidence of acute intracranial abnormality. 2. Tiny focus of low attenuation near head of right caudate nucleus. Findings suggest presence of tiny remote lacunar infarct which is a new   finding when compared to 7/14/2020 as described above. Critical results were called by Jonas Caceres. Hanny Jett MD to Lissa Pruitt on   3/16/2021 at 10:46.          MRI brain without contrast    (Results Pending)       ASSESSMENT:    Active Hospital Problems    Diagnosis Date Noted    Acute left-sided weakness [R53.1] 03/16/2021         PLAN:    Acute left-sided weakness - likely acute CVA. -  stroke team contacted from the ED, but unfortunately, patient was out of the TPA window. - CT head negative. - CTA head/neck - segments of bilateral MCA, CORINE, and PCA with stenosis. BICA without stenosis. - MRI brain ordered. - ECHO completed 7/2020 - EF 55-60% with grade I DD. Bubble study was negative. - monitor on tele.  - PT/OT/SLP evaluation. - continue home ASA. Add statin. - check A1c, FLP. - consult neurology. DM2, uncontrolled. - check A1c.    - hold home metformin. Patient wishes for alternate agent at dc as metformin causes her diarrhea. - medium dose SSI ordered. HTN - uncontrolled on arrival.  Could be contributing to presentation.  - hold home amlodipine. - prn labetalol for SBP >220 until CVA ruled out. DVT Prophylaxis: Lovenox  Diet: Diet NPO Effective Now  Code Status: Full Code    PT/OT Eval Status: ordered    Dispo - pending neuro work-up       Mack Spence, APRN - CNP    Thank you Sera Celis MD for the opportunity to be involved in this patient's care. If you have any questions or concerns please feel free to contact me at [phone].

## 2021-03-17 PROBLEM — I63.9 ACUTE CVA (CEREBROVASCULAR ACCIDENT) (HCC): Status: ACTIVE | Noted: 2021-03-17

## 2021-03-17 PROBLEM — I10 HTN (HYPERTENSION), BENIGN: Status: ACTIVE | Noted: 2020-11-03

## 2021-03-17 LAB
CHOLESTEROL, TOTAL: 187 MG/DL (ref 0–199)
ESTIMATED AVERAGE GLUCOSE: 211.6 MG/DL
GLUCOSE BLD-MCNC: 203 MG/DL (ref 70–99)
GLUCOSE BLD-MCNC: 205 MG/DL (ref 70–99)
GLUCOSE BLD-MCNC: 220 MG/DL (ref 70–99)
GLUCOSE BLD-MCNC: 264 MG/DL (ref 70–99)
HBA1C MFR BLD: 9 %
HCT VFR BLD CALC: 40.7 % (ref 36–48)
HDLC SERPL-MCNC: 20 MG/DL (ref 40–60)
HEMOGLOBIN: 14.1 G/DL (ref 12–16)
LDL CHOLESTEROL CALCULATED: ABNORMAL MG/DL
LDL CHOLESTEROL DIRECT: 79 MG/DL
MCH RBC QN AUTO: 28.1 PG (ref 26–34)
MCHC RBC AUTO-ENTMCNC: 34.5 G/DL (ref 31–36)
MCV RBC AUTO: 81.5 FL (ref 80–100)
PDW BLD-RTO: 14.1 % (ref 12.4–15.4)
PERFORMED ON: ABNORMAL
PLATELET # BLD: 116 K/UL (ref 135–450)
PMV BLD AUTO: 7.4 FL (ref 5–10.5)
RBC # BLD: 5 M/UL (ref 4–5.2)
TRIGL SERPL-MCNC: 512 MG/DL (ref 0–150)
VLDLC SERPL CALC-MCNC: ABNORMAL MG/DL
WBC # BLD: 4.4 K/UL (ref 4–11)

## 2021-03-17 PROCEDURE — 87635 SARS-COV-2 COVID-19 AMP PRB: CPT

## 2021-03-17 PROCEDURE — 83036 HEMOGLOBIN GLYCOSYLATED A1C: CPT

## 2021-03-17 PROCEDURE — 97110 THERAPEUTIC EXERCISES: CPT

## 2021-03-17 PROCEDURE — G0378 HOSPITAL OBSERVATION PER HR: HCPCS

## 2021-03-17 PROCEDURE — 97530 THERAPEUTIC ACTIVITIES: CPT

## 2021-03-17 PROCEDURE — 99223 1ST HOSP IP/OBS HIGH 75: CPT | Performed by: PSYCHIATRY & NEUROLOGY

## 2021-03-17 PROCEDURE — 96372 THER/PROPH/DIAG INJ SC/IM: CPT

## 2021-03-17 PROCEDURE — 97535 SELF CARE MNGMENT TRAINING: CPT

## 2021-03-17 PROCEDURE — 6370000000 HC RX 637 (ALT 250 FOR IP): Performed by: NURSE PRACTITIONER

## 2021-03-17 PROCEDURE — U0003 INFECTIOUS AGENT DETECTION BY NUCLEIC ACID (DNA OR RNA); SEVERE ACUTE RESPIRATORY SYNDROME CORONAVIRUS 2 (SARS-COV-2) (CORONAVIRUS DISEASE [COVID-19]), AMPLIFIED PROBE TECHNIQUE, MAKING USE OF HIGH THROUGHPUT TECHNOLOGIES AS DESCRIBED BY CMS-2020-01-R: HCPCS

## 2021-03-17 PROCEDURE — 97116 GAIT TRAINING THERAPY: CPT

## 2021-03-17 PROCEDURE — 80061 LIPID PANEL: CPT

## 2021-03-17 PROCEDURE — 85027 COMPLETE CBC AUTOMATED: CPT

## 2021-03-17 PROCEDURE — 36415 COLL VENOUS BLD VENIPUNCTURE: CPT

## 2021-03-17 PROCEDURE — 6360000002 HC RX W HCPCS: Performed by: NURSE PRACTITIONER

## 2021-03-17 PROCEDURE — 2580000003 HC RX 258: Performed by: NURSE PRACTITIONER

## 2021-03-17 PROCEDURE — 83721 ASSAY OF BLOOD LIPOPROTEIN: CPT

## 2021-03-17 PROCEDURE — 1200000000 HC SEMI PRIVATE

## 2021-03-17 PROCEDURE — 97162 PT EVAL MOD COMPLEX 30 MIN: CPT

## 2021-03-17 PROCEDURE — 92610 EVALUATE SWALLOWING FUNCTION: CPT

## 2021-03-17 RX ORDER — MECOBALAMIN 5000 MCG
5 TABLET,DISINTEGRATING ORAL NIGHTLY
Status: DISCONTINUED | OUTPATIENT
Start: 2021-03-17 | End: 2021-03-19 | Stop reason: HOSPADM

## 2021-03-17 RX ADMIN — ENOXAPARIN SODIUM 40 MG: 40 INJECTION SUBCUTANEOUS at 08:50

## 2021-03-17 RX ADMIN — ALLOPURINOL 300 MG: 300 TABLET ORAL at 08:50

## 2021-03-17 RX ADMIN — INSULIN LISPRO 3 UNITS: 100 INJECTION, SOLUTION INTRAVENOUS; SUBCUTANEOUS at 20:23

## 2021-03-17 RX ADMIN — INSULIN LISPRO 4 UNITS: 100 INJECTION, SOLUTION INTRAVENOUS; SUBCUTANEOUS at 12:35

## 2021-03-17 RX ADMIN — Medication 10 ML: at 19:40

## 2021-03-17 RX ADMIN — Medication 10 ML: at 08:50

## 2021-03-17 RX ADMIN — CETIRIZINE HYDROCHLORIDE 10 MG: 10 TABLET, FILM COATED ORAL at 08:50

## 2021-03-17 RX ADMIN — ATORVASTATIN CALCIUM 80 MG: 80 TABLET, FILM COATED ORAL at 19:39

## 2021-03-17 RX ADMIN — ASPIRIN 81 MG: 81 TABLET, CHEWABLE ORAL at 08:50

## 2021-03-17 RX ADMIN — Medication 5 MG: at 20:23

## 2021-03-17 RX ADMIN — INSULIN LISPRO 4 UNITS: 100 INJECTION, SOLUTION INTRAVENOUS; SUBCUTANEOUS at 08:51

## 2021-03-17 ASSESSMENT — PAIN SCALES - GENERAL
PAINLEVEL_OUTOF10: 0
PAINLEVEL_OUTOF10: 0

## 2021-03-17 NOTE — CARE COORDINATION
This writer met patient and spouse at bedside to confirm that ARU had accepted her pending Covid PCR completion. During conversation , her  asked if [employer] [facility] had approved her stay in the ARU. Our records show primary insurance as Medicare and [employer] as secondary. This writer contacted registration, who is meeting with  to validate coverage. ADDENDUM:  [employer] [facility] is primary  Medicare Part B secondary  Contacted [facility] - she will initiate precert with [facility]. Discussed SNF option with patient and spouse in case [facility] denies IPR. Provided them with [facility] list and CMU rating sheet. They will discuss, but are leaning toward [facility]. Will follow.       Delio Prater RN

## 2021-03-17 NOTE — PROGRESS NOTES
Physical Therapy    Facility/Department: [facility] C5 - MED SURG/ORTHO  Initial Assessment and treatment    NAME: Jonah Miramontes  : 1950  MRN: [id number]    Date of Service: 3/17/2021    Discharge Recommendations:  IP Rehab   PT Equipment Recommendations  Equipment Needed: (defer)    Assessment   Body structures, Functions, Activity limitations: Decreased functional mobility ; Decreased endurance;Decreased ROM; Decreased balance;Decreased posture;Decreased coordination;Decreased strength;Decreased fine motor control  Assessment: Pt referred for PT evaluation during current hospital stay with a diagnosis of acute L sided weakness, found to have a CVA. Pt is currently functioning well below baseline, requiring mod to max A for bed mobility, max A up to mod A x 2 for transfers, and mod to max A of 1x2 to take some steps to and from EOB <>BSC. Pt would benefit from skilled acute PT to address deficits. Recommend IP rehab at [place] from acute setting  Treatment Diagnosis: impaired mobility  Prognosis: Good  Decision Making: Medium Complexity  PT Education: Goals;Gait Training;PT Role;Disease Specific Education;Plan of Care; Functional Mobility Training;Transfer Training;Home Exercise Program  Patient Education: pt verbalized understanding of importance of OOB activity  Barriers to Learning: no  REQUIRES PT FOLLOW UP: Yes  Activity Tolerance  Activity Tolerance: Patient Tolerated treatment well  Activity Tolerance: /82; hr 71; SpO2 96% on RA       Patient Diagnosis(es): The primary encounter diagnosis was Cerebrovascular accident (CVA), unspecified mechanism ([facility] [address] A diagnosis of Hyperglycemia was also pertinent to this visit. has a past medical history of Diabetes mellitus ([address] GERD (gastroesophageal reflux disease), and Hypertension. has no past surgical history on file.     Restrictions  Restrictions/Precautions  Restrictions/Precautions: General Precautions, Fall Risk  Position Activity Restriction Other position/activity restrictions: Purewick, telemetry  Vision/Hearing  Vision: Impaired  Vision Exceptions: Wears glasses at all times  Hearing: Within functional limits     Subjective  General  Chart Reviewed: Yes  Patient assessed for rehabilitation services?: Yes  Response To Previous Treatment: Not applicable  Family / Caregiver Present: No  Referring Practitioner: True Spencer CNP  Referral Date : 03/16/21  Diagnosis: acute left sided weakness  Follows Commands: Within Functional Limits  General Comment  Comments: Pt resting in bed upon entry, RN cleared pt for therapy  Subjective  Subjective: PT agreeable to PT  Pain Screening  Patient Currently in Pain: Denies  Vital Signs  Patient Currently in Pain: Denies  Pre Treatment Pain Screening  Intervention List: Patient able to continue with treatment    Orientation  Orientation  Overall Orientation Status: Within Normal Limits  Social/Functional History  Social/Functional History  Lives With: Spouse  Type of Home: House  Home Layout: One level  Home Access: Ramped entrance  Bathroom Shower/Tub: Tub/Shower unit, Shower chair with back  Bathroom Toilet: [address]: (transport wc and adjustable bed)  ADL Assistance: Independent  Homemaking Assistance: Independent  Homemaking Responsibilities: Yes  Meal Prep Responsibility: Primary  Laundry Responsibility: Primary  Cleaning Responsibility: Primary  Shopping Responsibility: Primary  Ambulation Assistance: Independent(without AD)  Transfer Assistance: Independent  Active : Yes  Occupation: Retired  Type of occupation: marketing research,   Leisure & Hobbies: read on my [employer], card games on IPAD         Objective          AROM RLE (degrees)  RLE AROM: WNL  RLE General AROM: grossly 4+ to 5/5 throughout  PROM LLE (degrees)  LLE General PROM: grossly decreased throughout by at least 50% due to decreased strength  Strength RLE  Strength RLE: WNL  Comment: grossly 4+ to 5/5 throughout Strength LLE  Comment: grossly decreased to 2/5 throughout        Bed mobility  Rolling to Left: Minimal assistance  Rolling to Right: Moderate assistance  Supine to Sit: Moderate assistance  Sit to Supine: Maximum assistance  Scooting: Dependent/Total(to HOB)  Transfers  Sit to Stand: Maximum Assistance  Stand to sit: Moderate Assistance  Bed to Chair: Moderate assistance;2 Person Assistance(Able to transfer pt from EOB to [facility] with max A with therapist blocking at knee and advancing LLE, however, mod A x2 transfer from [facility] to [facility] for safety as pt's LUE and BLE strength are greatly diminished)  Ambulation  Ambulation?: (Pt took a few steps each way EOB <>BSC with max A x1 to mod A x2 with therapist blocking knee and advancing leg and additional assist for balance, without AD. Valerie Angeles would benefit from bisi walker next session)     Balance  Posture: Fair  Sitting - Static: Fair  Sitting - Dynamic: Fair  Standing - Static: Fair;-  Standing - Dynamic: Poor;+  Comments: Pt sat EOB with initial Piotr and progressing to SBA with cueing, but back to Piotr with fatigue  Exercises  Quad Sets: x 10 B poor contraction L quad  Heelslides: x 10 B with assist LLE  Gluteal Sets: x 10  Hip Flexion: x 10 B with assist LLE  Knee Long Arc Quad: x 10 B with assist LLE  Ankle Pumps: x 10 B with PROM LLE     Plan   Plan  Times per week: 4-6  Current Treatment Recommendations: Strengthening, Neuromuscular Re-education, Home Exercise Program, ROM, Balance Training, Endurance Training, Functional Mobility Training, Transfer Training, Gait Training  Safety Devices  Type of devices:  All fall risk precautions in place, Bed alarm in place, Nurse notified, Gait belt, Patient at risk for falls, Left in bed      AM-PAC Score  AM-PAC Inpatient Mobility Raw Score : 9 (03/17/21 1331)  AM-PAC Inpatient T-Scale Score : 30.55 (03/17/21 1331)  Mobility Inpatient CMS 0-100% Score: 81.38 (03/17/21 1331)  Mobility Inpatient CMS G-Code Modifier : CM (03/17/21 1331) Goals  Short term goals  Time Frame for Short term goals: 3/20/12 unless noted  Short term goal 1: Pt will perform bed mobility with min to mod A x 1 by 3/19/21  Short term goal 2: Pt will perform sit<>stand with min A x 1  Short term goal 3: Pt will perform bed<>chair transfer with Piotr x2  Short term goal 4: Pt will ambulate 5 ft with LRAD and mod A x2  Patient Goals   Patient goals : \"to get stronger\"       Therapy Time   Individual Concurrent Group Co-treatment   Time In 0929         Time Out 1022         Minutes 53         Timed Code Treatment Minutes: 37 Minutes    If pt is discharged prior to next therapy session, this note will serve as discharge summary.     Soraya Caldwell, PT

## 2021-03-17 NOTE — ACP (ADVANCE CARE PLANNING)
Advance Care Planning     General Advance Care Planning (ACP) Conversation    Date of Conversation: 3/16/2021  Conducted with: Patient with Decision Making Capacity    Healthcare Decision Maker:      Primary Decision Maker (Active): Rosio Pu - Spouse - [phone]    Click here to complete [employer] including selection of the Healthcare Decision Maker Relationship (ie \"Primary\")  Today we documented Decision Maker(s) consistent with Legal Next of Kin hierarchy. Content/Action Overview: Has ACP document(s) NOT on file - requested patient to provide  Reviewed DNR/DNI and patient elects Full Code (Attempt Resuscitation)  treatment goals  Patient has AD forms at home completed, would need assistance witnessing. Decision maker is consistent with NOK.     Length of Voluntary ACP Conversation in minutes:  30 minutes    Fara Corbett

## 2021-03-17 NOTE — PROGRESS NOTES
Speech Language Pathology  Facility/Department: [place] [address] - MED SURG/ORTHO   CLINICAL BEDSIDE SWALLOW EVALUATION    NAME: Angela Chong  : 1950  MRN: [id number]    ADMISSION DATE: 3/16/2021  ADMITTING DIAGNOSIS: has TIA (transient ischemic attack); DM (diabetes mellitus), type 2, uncontrolled with complications ([facility] [address] Essential hypertension with goal blood pressure less than 140/90; Hypertriglyceridemia; Dyslipidemia; Acute left-sided weakness; and Acute CVA (cerebrovascular accident) [facility]) on their problem list.  ONSET DATE: 21    Recent Chest Xray/CT of Chest: (21 )  Impression   No acute cardiopulmonary pathology. Date of Eval: 3/17/2021  Evaluating Therapist: Caio Alexander    Current Diet level:  Current Diet : Dysphagia Soft and Bite-Sized (Dysphagia III)  Current Liquid Diet : Thin      Primary Complaint  Per MD, H&P: Tr Rodrigues is a 69year old female type II diabetic with a history of TIA. She was last normal when she went to bed at 10pm. She woke up at 3:30 am to go to the bathroom and she had difficulty getting up to the bathroom without assistance from her . When she woke up this morning she had left sided arm and leg weakness, unable to ambulate without assistance. NIH stroke score at the time of my initial evaluation is 9.0. Pain:  Pain Assessment  Pain Assessment: 0-10  Pain Level: 0  Response to Pain Intervention: Patient Satisfied    Reason for Referral  Angela Chong was referred for a bedside swallow evaluation to assess the efficiency of her swallow function, identify signs and symptoms of aspiration and make recommendations regarding safe dietary consistencies, effective compensatory strategies, and safe eating environment. Impression  Dysphagia Diagnosis: Swallow function appears grossly intact  Dysphagia Outcome Severity Scale: Level 7: Normal in all situations   Pt was seen at bedside and was pleasant throughout the evaluation.  PO trials were given. Grossly timely swallow initiation throughout, no overt s/s of aspiration/penetration -- no coughing, throat clearing, wet vocal quality, change in RR, O2 sats, or increased WOB observed. Pt denied globus sensation post-swallow with all PO. Rotary chew pattern noted with regular solids, no oral / lingual residue post-swallow. Swallow function appears grossly WFL. Recommend upgrade to Regular solid diet (currently on Soft and bite sized diet) with thin liquids, meds whole with TL.  RN aware of recs. ST to continue to follow. Pt has mild dysarthria (minimal L-sided oral / facial weakness at rest), however, speech 100% intelligible. Pt denies any other speech and cognitive deficits, able to follow conversation with SLP and explain medical history and progress. ST briefly reviewed compensatory speech strategies (I.e. overarticulation, slow rate). Vitals/labs:   Temp: n/a  SpO2: 96  RR: 16/min  BP: 157/75  HR: 74    Treatment Plan  Requires SLP Intervention: Yes  Duration/Frequency of Treatment: 2x/week  D/C Recommendations: (Per OT/PT Recs)       Recommended Diet and Intervention  Diet Solids Recommendation: Regular  Liquid Consistency Recommendation: Thin  Recommended Form of Meds: Whole with water  Recommendations: Dysphagia treatment       Compensatory Swallowing Strategies  Compensatory Swallowing Strategies: Alternate solids and liquids;Eat/Feed slowly;Upright as possible for all oral intake;Remain upright for 30-45 minutes after meals    Treatment/Goals  Short-term Goals  Timeframe for Short-term Goals: 3 days (03/20/21)  Long-term Goals  Timeframe for Long-term Goals: 5 days (03/22/21)  Goal 1: Pt and caregiver will understand and implement safe strategies for swallowing  Dysphagia Goals: The patient will tolerate recommended diet without observed clinical signs of aspiration; The patient/caregiver will demonstrate understanding of compensatory strategies for improved swallowing safety. General  Chart Reviewed: Yes  Subjective  Subjective: Pt was seen at bedside and was pleasant throughout evaluation  Behavior/Cognition: Alert; Cooperative;Pleasant mood  Respiratory Status: Room air  Communication Observation: Functional  Follows Directions: Simple  Dentition: Adequate  Patient Positioning: Upright in bed  Baseline Vocal Quality: Normal  Prior Dysphagia History: No prior history per chat  Consistencies Administered: Reg solid; Dysphagia Pureed (Dysphagia I); Thin;Thin - straw    Vision/Hearing  Vision  Vision: Impaired  Vision Exceptions: Wears glasses at all times  Hearing  Hearing: Within functional limits    Oral Motor Deficits  Oral/Motor  Oral Motor: Within functional limits; minimal L-sided oral / facial weakness at rest    Oral Phase Dysfunction  Oral Phase  Oral Phase: WNL     Indicators of Pharyngeal Phase Dysfunction   Pharyngeal Phase  Pharyngeal Phase: WNL    Prognosis  Prognosis  Prognosis for safe diet advancement: good  Individuals consulted  Consulted and agree with results and recommendations: Patient    Education  Patient Education: Pt educated on role of ST, reason for referral, assessment and results. Patient Education Response: Verbalizes understanding  Safety Devices in place: Yes  Type of devices: Left in bed;Nurse notified;Call light within reach; All fall risk precautions in place       Therapy Time  SLP Individual Minutes  Time In: 0908  Time Out: 40 [facility]  Minutes: 1412 [facility], Speech Therapy

## 2021-03-17 NOTE — PROGRESS NOTES
Occupational Therapy  Facility/Department: [facility] C5 - MED SURG/ORTHO  Daily Treatment Note  NAME: Isabel Gamez  : 1950  MRN: [id number]    Date of Service: 3/17/2021    Discharge Recommendations:  IP Rehab     Assessment   Performance deficits / Impairments: Decreased functional mobility ; Decreased balance;Decreased ADL status; Decreased sensation;Decreased strength;Decreased high-level IADLs  Prognosis: Good  OT Education: OT Role;Plan of Care;Transfer Training;ADL Adaptive Strategies  Patient Education: disease specific: attention to LUE and LUE ROM, role of OT  REQUIRES OT FOLLOW UP: Yes  Activity Tolerance  Activity Tolerance: Patient Tolerated treatment well  Safety Devices  Safety Devices in place: Yes  Type of devices: Left in chair;Chair alarm in place;Call light within reach;Nurse notified;Gait belt         Patient Diagnosis(es): The primary encounter diagnosis was Cerebrovascular accident (CVA), unspecified mechanism ([facility] [address] A diagnosis of Hyperglycemia was also pertinent to this visit. has a past medical history of Diabetes mellitus ([facility] [address] GERD (gastroesophageal reflux disease), and Hypertension. has no past surgical history on file. Restrictions  Restrictions/Precautions  Restrictions/Precautions: General Precautions, Fall Risk  Position Activity Restriction  Other position/activity restrictions: Purewick, telemetry     Subjective   General  Chart Reviewed: Yes  Patient assessed for rehabilitation services?: Yes  Response to previous treatment: Patient with no complaints from previous session  Family / Caregiver Present: No  Referring Practitioner: RAJESH Murcia CNP  Diagnosis: CVA with Left hemiparesis    Subjective  Subjective: Pt resting in bed, pleasant and agreeable to OT treatment.     Vital Signs  Pulse: 70  BP: (!) 177/100  Patient Currently in Pain: Denies  Oxygen Therapy  SpO2: 98 %  O2 Device: None (Room air)     Orientation  Orientation  Overall Orientation Status: Within Functional Limits     Objective    ADL  Toileting: Dependent/Total(purewick, total A for hygiene and brief change in rolling)     Balance  Sitting Balance:  Moderate assistance(varies from CGA to mod A leaning to L)    Bed mobility  Rolling to Left: Maximum assistance  Rolling to Right: Moderate assistance  Supine to Sit: Moderate assistance(to R with HOB elevated)     Transfers  Stand Pivot Transfers: Maximum assistance(to pt R)     Perception  Overall Perceptual Status: Impaired  Unilateral Attention: Cues to maintain midline in sitting     Plan   Plan  Times per week: 3-5 x/ week  Current Treatment Recommendations: Balance Training, Functional Mobility Training, Safety Education & Training, Positioning, Neuromuscular Re-education, Self-Care / ADL           AM-PAC Score  AM-[facility] Inpatient Daily Activity Raw Score: 11 (03/17/21 1504)  AM-PAC Inpatient ADL T-Scale Score : 29.04 (03/17/21 1504)  ADL Inpatient CMS 0-100% Score: 70.42 (03/17/21 1504)  ADL Inpatient CMS G-Code Modifier : CL (03/17/21 1504)    Goals  Short term goals  Time Frame for Short term goals: 1 week(3-23-21)  Short term goal 1: mod assist of 1 with stand-pivot transfers by 3-23-21-- ongoing 3/17/21  Short term goal 2: min assist with UE light ADL's-- ongoing 3/17/21  Short term goal 3: min assist with LUE self ROM/positioning-- ongoing 3/17/21  Short term goal 4: mod assist with toileting hygiene by 3-21-21-- ongoing 3/17/21  Patient Goals   Patient goals : go home when able       Therapy Time   Individual Concurrent Group Co-treatment   Time In 1155         Time Out 1220         Minutes ALEX Do/MAVIS

## 2021-03-17 NOTE — CONSULTS
In patient Neurology consult        [facility] Neurology      MD Lilo Traylor  1950    Date of Service: 3/17/2021    Referring Physician: Doris Kay MD      Reason for the consult and CC: Acute left-sided weakness and a new stroke. HPI:   The patient is a 69y.o.  years old female with history of diabetes, hypertension and prior TIA who was admitted to the hospital yesterday with new onset left-sided weakness. Symptoms started the night before admission. Description is sudden weakness of her left arm and leg. Degree was severe. Duration was persistent. No other triggers or other associated symptom except mild dysarthria. No headache or chest pain or visual changes. No other leaving or aggravating factors. She came into the ED. She was not a candidate for TPA. Initial work-up with CT of the head showed no acute findings. CTA showed right MCA stenosis and multiple intracranial atherosclerosis and stenosis. She was admitted. Further work-up with MRI brain which I did review showed acute right MCA stroke. Today she is the same. Blood pressure has been elevated. No other new symptoms. Other review of system was unremarkable.       Family history: Noncontributory    Surgical history: Noncontributory     Past Medical History:   Diagnosis Date    Diabetes mellitus ([address]     GERD (gastroesophageal reflux disease)     Hypertension      Social History     Tobacco Use    Smoking status: Never Smoker    Smokeless tobacco: Never Used   Substance Use Topics    Alcohol use: Not Currently    Drug use: Never     Allergies   Allergen Reactions    Amoxicillin     Bactrim [Sulfamethoxazole-Trimethoprim]     Biaxin [Clarithromycin]     Calan [Verapamil]     Ciprofloxacin      Current Facility-Administered Medications   Medication Dose Route Frequency Provider Last Rate Last Admin    allopurinol (ZYLOPRIM) tablet 300 mg  300 mg Oral Daily RAJESH Mathews - CNP 300 mg at 03/17/21 0850    aspirin chewable tablet 81 mg  81 mg Oral Daily Teryl Schimke, APRN - CNP   81 mg at 03/17/21 0850    cetirizine (ZYRTEC) tablet 10 mg  10 mg Oral Daily Teryl Schimke, APRN - CNP   10 mg at 03/17/21 0850    sodium chloride flush 0.9 % injection 10 mL  10 mL Intravenous 2 times per day Teryl Schimke, APRN - CNP   10 mL at 03/17/21 0850    sodium chloride flush 0.9 % injection 10 mL  10 mL Intravenous PRN Teryl Schimke, APRN - CNP        promethazine (PHENERGAN) tablet 12.5 mg  12.5 mg Oral Q6H PRN Teryl Schimke, APRN - CNP        Or    ondansetron [facility]) injection 4 mg  4 mg Intravenous Q6H PRN Teryl Schimke, APRN - CNP        polyethylene glycol (GLYCOLAX) packet 17 g  17 g Oral Daily PRN Teryl Schimke, APRN - CNP        enoxaparin (LOVENOX) injection 40 mg  40 mg Subcutaneous Daily Teryl Schimke, APRN - CNP   40 mg at 03/17/21 0850    atorvastatin (LIPITOR) tablet 80 mg  80 mg Oral Nightly Teryl Schimke, APRN - CNP   80 mg at 03/16/21 2126    labetalol (NORMODYNE;TRANDATE) injection 10 mg  10 mg Intravenous Q10 Min PRN Teryl Schimke, APRN - CNP        insulin lispro (HUMALOG) injection vial 0-12 Units  0-12 Units Subcutaneous TID WC Teryl Schimke, APRN - CNP   2 Units at 03/16/21 1801    insulin lispro (HUMALOG) injection vial 0-6 Units  0-6 Units Subcutaneous Nightly Teryl Schimke, APRN - CNP   1 Units at 03/16/21 2126    glucose (GLUTOSE) 40 % oral gel 15 g  15 g Oral PRN Teryl Schimke, APRN - CNP        dextrose 50 % IV solution  12.5 g Intravenous PRN Teryl Schimke, APRN - CNP        glucagon (rDNA) injection 1 mg  1 mg Intramuscular PRN Teryl Schimke, APRN - CNP        dextrose 5 % solution  100 mL/hr Intravenous PRN Teryl Schimke, APRN - CNP           ROS : A 10-14 system review of constitutional, cardiovascular, respiratory, eyes, musculoskeletal, endocrine, GI, ENT, skin, hematological, genitourinary, psychiatric and neurologic systems was obtained and updated today and is unremarkable except as mentioned in my HPI      Exam:     Constitutional:   Vitals:    03/17/21 0325 03/17/21 0330 03/17/21 0848 03/17/21 0944   BP: (!) 167/80 (!) 159/86 (!) 146/77 (!) 164/82   Pulse: 62  61 72   Resp: 17 16 16   Temp: 98 °F (36.7 °C)  98.1 °F (36.7 °C)    TempSrc: Oral  Oral Oral   SpO2: 98%  96% 96%   Weight:       Height:           General appearance and observation: Normal development and appear in no acute distress. Eye:  Fundus: No blurring of optic disc. Neck: supple  Cardiovascular: No lower leg edema with good pulsation. Mental Status:   Oriented to person, place, problem, and time. Memory: Good immediate recall. Intact remote memory  Normal attention span and concentration. Language: Dysarthria but no aphasia   good fund of Knowledge. Aware of current events and vocabulary   Cranial Nerves:   II: Visual fields: Full to confrontation and nl VA. Pupils: equal, round, reactive to light  III,IV,VI: Extra Ocular Movements are intact. No nystagmus  V: Facial sensation is intact  VII: Facial strength and movements: Left facial asymmetry  VIII: Hearing: Intact to finger rub bilaterally  IX: Palate elevation is symmetric  XI: Shoulder shrug is intact  XII: Tongue movements are normal  Musculoskeletal: 5/5 in the right side. Left arm 2/5 in left leg -4  Tone: Normal tone. Reflexes: Symmetric 2+ in the arms and 2+ in the legs   Planters: flexor on the right and extensor on the left. Coordination: Left pronator drift, no dysmetria with FNF in upper extremities. Normal REM on the right and cannot be tested on the left due to weakness. Sensation: normal to all modalities in both arms and legs.   Gait/Posture: Not tested a significant weakness    Data:  LABS:   Lab Results   Component Value Date     03/16/2021    K 3.6 03/16/2021    K 3.4 07/14/2020    CL 99 03/16/2021    CO2 30 03/16/2021    BUN 17 03/16/2021    CREATININE 1.1 03/16/2021    GFRAA 59 03/16/2021    LABGLOM 49 03/16/2021    GLUCOSE 287 03/16/2021    MG 2.20 07/14/2020    CALCIUM 11.4 03/16/2021     Lab Results   Component Value Date    WBC 4.4 03/17/2021    RBC 5.00 03/17/2021    HGB 14.1 03/17/2021    HCT 40.7 03/17/2021    MCV 81.5 03/17/2021    RDW 14.1 03/17/2021     03/17/2021     Lab Results   Component Value Date    INR 0.88 03/16/2021    PROTIME 10.2 03/16/2021       Neuroimaging was independently reviewed by myself and discussed results with the patient   Reviewed notes from different physicians  Reviewed lab and blood testing    Impression:  Acute right ischemic MCA stroke with residual dysarthria and left-sided weakness. Possible thromboembolic versus cardioembolic  Hypertension, not controlled  Hyperlipidemia  Multiple intracranial stenosis  Diabetes, not controlled. A1c of 9.0      Recommendation:  Echo  Speech and swallow evaluation  PT and OT  Telemetry  DVT and GI prophylaxis  Insulin sliding scale  Blood sugar control  Resume home blood pressure medication  Avoid blood pressure below 14090  Inpatient rehab consultation  Neurochecks  Aspiration precautions  Nothing from vascular intervention can be done for her intracranial stenosis. Long discussion with the patient regarding stroke prevention and risk of recurrence      MDM: High complexity due to significant deficits, recent stroke and high risk of recurrence as well as risk of morbidity with such a stroke.         Patient's instructions:    Discussed the following stroke prevention goals with the patient:     AP therapy: Risk and benefit, side effect and possible bleeding and the need to be consistent with AP therapy      Blood Pressure:  Goal  reduce BP 10/5 from baseline for all patients   History of hypertension: goal BP < 140/90  History of diabetes or renal disease: goal BP < 130/80  No history of hypertension: goal BP < 120/80     Dyslipidemia:  Atherosclerotic Stroke or TIA: LDL goal < 70mg/dl or 50% reduction in LDL from baseline  Symptomatic atherosclerotic cardiovascular disease and ? 74yo: high-intensity statin  Symptomatic atherosclerotic cardiovascular disease and > 74yo: moderate-intensity statin  Medications:   High-intensity statin: atorvastatin 40-80mg, rosuvastatin 20-40mg  Moderate-intensity statin: atorvastatin 10-20mg, rosuvastatin 5-10mg, simvastatin 20-40mg, pravastatin 40-80mg, lovastatin 40mg, fluvastatin 40mg bid (XL 80mg daily), pitavastatin 2-4mg  Side effects from statin were discussed and addressed the need to follow LFT and CK if necessary with PCP     Diabetes Mellitus:   Goal  HbA1c < 7%     Alcohol Consumption:  Non-pregnant women  one or fewer drinks per day     Physical Activity:  Goal  at least 30 minutes of moderate intensity physical exercise 1-3 times per week     Diet:  Low-fat, low-sodium, and Mediterranean or Dietary Approaches to Stop Hypertension (DASH) or Diabetic Diet when applicable     Obesity:  Goal BMI 18.5-25 kg/m2        Thank you for referring such patient. If you have any questions regarding my consult note, please don't hesitate to call me. Rufino Liang MD  [phone]    This dictation was generated by voice recognition computer software.  Although all attempts are made to edit the dictation for accuracy, there may be errors in the  transcription that are not intended

## 2021-03-17 NOTE — PLAN OF CARE
Problem: Nutrition  Intervention: Swallowing evaluation  Note: Bedside swallow evaluation completed this date. Sushila Perez M.S. [address] [facility], [place]  Speech-language pathologist  [id number]      Intervention: Aspiration precautions  Note: Bedside swallow evaluation completed this date.     Sushila Perez M.S. [address] [facility], [place]  Speech-language pathologist  [id number]

## 2021-03-17 NOTE — PROGRESS NOTES
Started pre certification for IPR, clinicals faxed to [facility]. Pending authorization number [id number]. Negative PCR required prior to admission.   Thank you for the referral.

## 2021-03-17 NOTE — CARE COORDINATION
Call received from [facility] - they can accept patient when PCR is resulted. Follow.       Ashleigh Singh, RN

## 2021-03-17 NOTE — CARE COORDINATION
CASE MANAGEMENT INITIAL ASSESSMENT      Reviewed chart and completed assessment at bedside with patient    Explained Case Management role/services. Primary contact information:as below  Health Care Decision Maker :   Primary Decision Maker (Active): Madison Koyanagi - Spouse - [phone]      Patient has no AD's - discussed need and she agrees. Referral to Spiritual Care to complete. Can this person be reached and be able to respond quickly, such as within a few minutes or hours? Yes     Admit date/status:3/17  IPTA    Diagnosis: acute CVA    Is this a Readmission?:  No      Insurance:[employer]     Precert required for SNF: Yes       3 night stay required: No    Living arrangements, Adls, care needs, prior to admission:IPTA. Lives with spouse in private home. Transportation:self and spouse    Durable Medical Equipment at home:  Walker__Cane__RTS__ BSC__Shower Chair__  02__ HHN__ CPAP__  BiPap__  Hospital Bed__ W/C___ Other__none________    Services in the home and/or outpatient, prior to admission: none     PT/OT recs:IPR     Hospital Exemption Notification (HEN):needed if SNF    Barriers to discharge:none    Plan/comments:PAtient prefers ARU.  Waiting for PT recs    ECOC on chart for MD signature  Yes    German Da Silva RN

## 2021-03-17 NOTE — PLAN OF CARE
Pt educated on the need to turn every 2 hours to prevent any skin integrity breakdown. Pt verbalizes understanding. Pt compliant with q2 hour turns. See documentation.

## 2021-03-17 NOTE — PROGRESS NOTES
Hospitalist Progress Note      PCP: Herman Peña MD    Date of Admission: 3/16/2021    Chief Complaint: Left sided weakness    Hospital Course: 69 y.o. female, with a PMH of DM2, HTN, and TIA, who presented to [facility] with left sided weakness. The patient went to bed around 2230 in her usual state of health. She woke up at 0330 and had difficulty walking. She waited to see if her symptoms passed and finally came to the ED around 1000 this AM.  The  stroke team was notified, but unfortunately, she was out of the TPA window. She denies fever, chills, chest pain, palpitations, abdominal pain, N/V/D. Subjective: Still with residual left sided weakness. Medications:  Reviewed    Infusion Medications    dextrose       Scheduled Medications    allopurinol  300 mg Oral Daily    aspirin  81 mg Oral Daily    cetirizine  10 mg Oral Daily    sodium chloride flush  10 mL Intravenous 2 times per day    enoxaparin  40 mg Subcutaneous Daily    atorvastatin  80 mg Oral Nightly    insulin lispro  0-12 Units Subcutaneous TID WC    insulin lispro  0-6 Units Subcutaneous Nightly     PRN Meds: sodium chloride flush, promethazine **OR** ondansetron, polyethylene glycol, labetalol, glucose, dextrose, glucagon (rDNA), dextrose      Intake/Output Summary (Last 24 hours) at 3/17/2021 1855  Last data filed at 3/17/2021 1813  Gross per 24 hour   Intake    Output 525 ml   Net -525 ml       Physical Exam Performed:    /84   Pulse 64   Temp 97.6 °F (36.4 °C) (Oral)   Resp 16   Ht 5' (1.524 m)   Wt 162 lb (73.5 kg)   SpO2 97%   BMI 31.64 kg/m²     General appearance: No apparent distress, appears stated age and cooperative. HEENT: Pupils equal, round, and reactive to light. Conjunctivae/corneas clear. Neck: Supple, with full range of motion. No jugular venous distention. Trachea midline. Respiratory:  Normal respiratory effort. Clear to auscultation, bilaterally without Rales/Wheezes/Rhonchi. Cardiovascular: Regular rate and rhythm with normal S1/S2 without murmurs, rubs or gallops. Abdomen: Soft, non-tender, non-distended with normal bowel sounds. Musculoskeletal: No clubbing, cyanosis or edema bilaterally. Full range of motion without deformity. Skin: Skin color, texture, turgor normal.  No rashes or lesions. Neurologic:  Neurovascularly intact without any focal sensory/motor deficits. Cranial nerves: II-XII intact, grossly non-focal.  Psychiatric: Alert and oriented, thought content appropriate, normal insight  Capillary Refill: Brisk,< 3 seconds   Peripheral Pulses: +2 palpable, equal bilaterally       Labs:   Recent Labs     03/16/21  1018 03/17/21  0603   WBC 6.6 4.4   HGB 16.5* 14.1   HCT 47.3 40.7   * 116*     Recent Labs     03/16/21  1018      K 3.6   CL 99   CO2 30   BUN 17   CREATININE 1.1   CALCIUM 11.4*     Recent Labs     03/16/21  1018   AST 44*   ALT 40   BILITOT 1.1*   ALKPHOS 67     Recent Labs     03/16/21  1018   INR 0.88     Recent Labs     03/16/21  1018   TROPONINI <0.01       Urinalysis:      Lab Results   Component Value Date    NITRU Negative 03/16/2021    WBCUA 3-5 03/16/2021    BACTERIA 1+ 03/16/2021    RBCUA 3-4 03/16/2021    BLOODU TRACE-INTACT 03/16/2021    SPECGRAV 1.015 03/16/2021    GLUCOSEU >=1000 03/16/2021       Radiology:  MRI brain without contrast   Final Result   Acute lacunar infarcts within the right MCA vascular territory. The findings were sent to the Radiology Results [address] at 4:23   pm on 3/16/2021to be communicated to a licensed caregiver. XR CHEST PORTABLE   Final Result   No acute cardiopulmonary pathology. CTA HEAD NECK W CONTRAST   Final Result   Partial occlusion of a M3 branch of the right MCA      50% stenosis in the distal A2 segment of the right CORINE. 70% stenosis in distal A2 segment of the left CORINE. 75% stenosis in the proximal V4 segment of the right vertebral artery.       50% stenosis in the distal P2 segment of the left PCA. 70% stenosis in the P3 segment of the right PCA. No acute abnormality or flow-limiting stenosis of the major arteries of the   neck. Beading of the bilateral cervical internal carotid arteries and vertebral   arteries, likely related to mild fibromuscular dysplasia. CT HEAD WO CONTRAST   Final Result   1. No definite evidence of acute intracranial abnormality. 2. Tiny focus of low attenuation near head of right caudate nucleus. Findings suggest presence of tiny remote lacunar infarct which is a new   finding when compared to 7/14/2020 as described above. Critical results were called by Ashley Cheek. Vaughn Esquivel MD to Michael Leonard on   3/16/2021 at 10:46. Assessment/Plan:    Active Hospital Problems    Diagnosis    Cerebrovascular accident (CVA) ([facility] [address] [I63.9]    DM (diabetes mellitus), secondary, uncontrolled, w/neurologic complic ([facility] [address] [H69.89, G49.98]    Acute left-sided weakness [R53.1]    HTN (hypertension), benign [I10]     Acute left-sided weakness - likely acute CVA. -  stroke team contacted from the ED, but unfortunately, patient was out of the TPA window. - CT head negative. - CTA head/neck - segments of bilateral MCA, CORINE, and PCA with stenosis. BICA without stenosis. - MRI brain - acute lacunar infarcts within the right MCA territory. - ECHO completed 7/2020 - EF 55-60% with grade I DD. Bubble study was negative. - monitor on tele.  - PT/OT/SLP evaluation. - continue home ASA. Add statin. - A1c 9, LDL 79.  - consult neurology.     DM2, uncontrolled. - A1c 9.    - hold home metformin. Patient wishes for alternate agent at dc as metformin causes her diarrhea. - medium dose SSI ordered.     HTN - uncontrolled on arrival.  Could be contributing to presentation.  - hold home amlodipine.     - prn labetalol for SBP >220   - avoid BP < 140/90          DVT Prophylaxis: Lovenox  Diet: DIET GENERAL; Carb Control: 4 carb choices (60 gms)/meal  Code Status: Full Code    PT/OT Eval Status: recommend ARU    Dispo - likely 1-2 days    Madelon Holstein, APRN - [place]

## 2021-03-18 LAB
GLUCOSE BLD-MCNC: 213 MG/DL (ref 70–99)
GLUCOSE BLD-MCNC: 229 MG/DL (ref 70–99)
GLUCOSE BLD-MCNC: 252 MG/DL (ref 70–99)
GLUCOSE BLD-MCNC: 259 MG/DL (ref 70–99)
PERFORMED ON: ABNORMAL
SARS-COV-2, PCR: NOT DETECTED

## 2021-03-18 PROCEDURE — 6360000002 HC RX W HCPCS: Performed by: NURSE PRACTITIONER

## 2021-03-18 PROCEDURE — 1200000000 HC SEMI PRIVATE

## 2021-03-18 PROCEDURE — 97530 THERAPEUTIC ACTIVITIES: CPT

## 2021-03-18 PROCEDURE — 6370000000 HC RX 637 (ALT 250 FOR IP): Performed by: NURSE PRACTITIONER

## 2021-03-18 PROCEDURE — 97110 THERAPEUTIC EXERCISES: CPT

## 2021-03-18 PROCEDURE — 97116 GAIT TRAINING THERAPY: CPT

## 2021-03-18 PROCEDURE — 97535 SELF CARE MNGMENT TRAINING: CPT

## 2021-03-18 PROCEDURE — 99233 SBSQ HOSP IP/OBS HIGH 50: CPT | Performed by: PSYCHIATRY & NEUROLOGY

## 2021-03-18 PROCEDURE — 2580000003 HC RX 258: Performed by: NURSE PRACTITIONER

## 2021-03-18 RX ORDER — INSULIN GLARGINE 100 [IU]/ML
0.2 INJECTION, SOLUTION SUBCUTANEOUS NIGHTLY
Status: DISCONTINUED | OUTPATIENT
Start: 2021-03-18 | End: 2021-03-19

## 2021-03-18 RX ADMIN — ENOXAPARIN SODIUM 40 MG: 40 INJECTION SUBCUTANEOUS at 09:17

## 2021-03-18 RX ADMIN — Medication 5 MG: at 20:40

## 2021-03-18 RX ADMIN — ATORVASTATIN CALCIUM 80 MG: 80 TABLET, FILM COATED ORAL at 20:40

## 2021-03-18 RX ADMIN — ALLOPURINOL 300 MG: 300 TABLET ORAL at 09:16

## 2021-03-18 RX ADMIN — INSULIN GLARGINE 15 UNITS: 100 INJECTION, SOLUTION SUBCUTANEOUS at 20:39

## 2021-03-18 RX ADMIN — INSULIN LISPRO 4 UNITS: 100 INJECTION, SOLUTION INTRAVENOUS; SUBCUTANEOUS at 09:21

## 2021-03-18 RX ADMIN — INSULIN LISPRO 6 UNITS: 100 INJECTION, SOLUTION INTRAVENOUS; SUBCUTANEOUS at 12:07

## 2021-03-18 RX ADMIN — INSULIN LISPRO 3 UNITS: 100 INJECTION, SOLUTION INTRAVENOUS; SUBCUTANEOUS at 20:39

## 2021-03-18 RX ADMIN — CETIRIZINE HYDROCHLORIDE 10 MG: 10 TABLET, FILM COATED ORAL at 09:16

## 2021-03-18 RX ADMIN — INSULIN LISPRO 4 UNITS: 100 INJECTION, SOLUTION INTRAVENOUS; SUBCUTANEOUS at 17:38

## 2021-03-18 RX ADMIN — ASPIRIN 81 MG: 81 TABLET, CHEWABLE ORAL at 09:16

## 2021-03-18 RX ADMIN — Medication 10 ML: at 09:17

## 2021-03-18 RX ADMIN — Medication 10 ML: at 20:46

## 2021-03-18 ASSESSMENT — PAIN SCALES - GENERAL
PAINLEVEL_OUTOF10: 0

## 2021-03-18 NOTE — PROGRESS NOTES
Orientation Status: Within Normal Limits  Cognition      Objective   Bed mobility  Supine to Sit: Minimal assistance(to R with HOB elevated)  Transfers  Stand Pivot Transfers: Moderate Assistance(to pt R, x2 trials)  Ambulation  Ambulation?: No     Balance  Comments: Sitting Balance: Contact guard assistance  Standing Balance: Moderate assistance(static to don briefs over hips with RUE on armrest as UE support)  Exercises  Quad Sets: x 10 B poor contraction L quad  Heelslides: x 10 B with assist LLE  Gluteal Sets: x 10  Hip Flexion: x 10 B with assist LLE  Hip Abduction: clam shells 10  Knee Long Arc Quad: x 10 B with assist LLE  Ankle Pumps: x 10 B with PROM LLE             AM-PAC Score  AM-PAC Inpatient Mobility Raw Score : 12 (03/18/21 1049)  AM-PAC Inpatient T-Scale Score : 35.33 (03/18/21 1049)  Mobility Inpatient CMS 0-100% Score: 68.66 (03/18/21 1049)  Mobility Inpatient CMS G-Code Modifier : CL (03/18/21 1049)          Goals  Short term goals  Time Frame for Short term goals: 3/20/12 unless noted  Short term goal 1: Pt will perform bed mobility with min to mod A x 1 by 3/19/21  -3/18  min A  Short term goal 2: Pt will perform sit<>stand with min A x 1   -3/18 N/t  Short term goal 3: Pt will perform bed<>chair transfer with Piotr x2    -3/18 mod A SPT  Short term goal 4: Pt will ambulate 5 ft with LRAD and mod A x2   -3/18 N/T  Patient Goals   Patient goals : \"to get stronger\"    Plan    Plan  Times per week: 4-6  Current Treatment Recommendations: Strengthening, Neuromuscular Re-education, Home Exercise Program, ROM, Balance Training, Endurance Training, Functional Mobility Training, Transfer Training, Gait Training  Safety Devices  Type of devices:  All fall risk precautions in place, Bed alarm in place, Nurse notified, Gait belt, Patient at risk for falls, Left in bed     Therapy Time   Individual Concurrent Group Co-treatment   Time In 0840         Time Out 0918         Minutes 38         Timed Code Treatment Minutes: [address]

## 2021-03-18 NOTE — CONSULTS
Patient: Quynh Flor  [id number]  Date: 3/18/2021      Chief Complaint: left-sided weakness    History of Present Illness/Hospital Course:  78-year-old female to history of HTN, diabetes, and GERD who was admitted on 3/16 with acute left-sided weakness. CT head was unremarkable. CTA with multiple intracranial stenoses and occlusion of the right MCA M3 branch. MRI with acute lacunar infarcts in the right MCA territory. LDL unable to be calculated due to severely elevated triglycerides. A1c 9.0. neurology evaluated and suggested improvement and comorbidities on top of standard aspirin and statin prophylaxis. She was evaluated by therapy and suggested to continue in an inpatient setting prior to returning home. She is strongly interested in an ARU state. Her echo is currently pending. Prior Level of Function:  Independent     Current Level of Function:  Total assist     has a past medical history of Diabetes mellitus ([address] GERD (gastroesophageal reflux disease), and Hypertension. has no past surgical history of note     reports that she has never smoked. She has never used smokeless tobacco. She reports previous alcohol use. She reports that she does not use drugs. family history is not contributory    REVIEW OF SYSTEMS:   CONSTITUTIONAL: negative for fevers, chills, diaphoresis, appetite change, fatigue, night sweats and unexpected weight change. HEENT: negative for hearing loss, tinnitus, ear drainage, sinus pressure, nasal congestion, epistaxis and snoring. RESPIRATORY: Negative for hemoptysis, cough, sputum production. CARDIOVASCULAR: negative for chest pain, palpitations, exertional chest pressure/discomfort, edema, syncope. GASTROINTESTINAL: negative for nausea, vomiting, diarrhea, constipation, blood in stool and abdominal pain. GENITOURINARY: negative for frequency, dysuria, urinary incontinence, decreased urine volume, and hematuria.   HEMATOLOGIC/LYMPHATIC: negative for easy bruising, bleeding and lymphadenopathy. ALLERGIC/IMMUNOLOGIC: negative for recurrent infections, angioedema, anaphylaxis and drug reactions. ENDOCRINE: negative for weight changes and diabetic symptoms including polyuria, polydipsia and polyphagia. MUSCULOSKELETAL: negative for pain, joint swelling, decreased range of motion and muscle weakness. NEUROLOGICAL: negative for headaches, slurred speech. positive unilateral weakness. PSYCHIATRIC/BEHAVIORAL: negative for hallucinations, behavioral problems, confusion and agitation. All pertinent positives are noted in the HPI. Physical Examination:  Vitals:   Patient Vitals for the past 24 hrs:   BP Temp Temp src Pulse Resp SpO2   03/18/21 1400 (!) 144/72 97.9 °F (36.6 °C) Oral 76 18 98 %   03/18/21 1352  97.5 °F (36.4 °C) Oral  16    03/18/21 1230 117/80 97.6 °F (36.4 °C) Oral 67 18 96 %   03/18/21 1000 (!) 146/79 97.9 °F (36.6 °C) Oral 81  96 %   03/18/21 0923 (!) 174/89   84  99 %   03/18/21 0815 (!) 179/74 97.6 °F (36.4 °C) Oral 73 16 96 %   03/18/21 0317 (!) 151/89 97.6 °F (36.4 °C) Axillary 66 16 97 %   03/17/21 2324 (!) 155/87 97.9 °F (36.6 °C) Oral 69 17 97 %   03/17/21 1936 (!) 161/82 98.4 °F (36.9 °C) Oral 68 16 95 %     Psych: Stable mood, normal judgement, normal affect. Const: No distress  Eyes: Conjunctiva noninjected, no icterus noted; pupils equal, round. HENT: Atraumatic, normocephalic; Oral mucosa moist  Neck: Trachea midline, neck supple. No thyromegaly noted. CV: No audible murmurs  Resp: No increased WOB, no audible wheezing   GI: Nondistended   Neuro: Alert, oriented, appropriate. Skin: No visible abnormalities  MSK: No joint abnormalities noted. Ext: No significant edema appreciated. No varicosities.     Lab Results   Component Value Date    WBC 4.4 03/17/2021    HGB 14.1 03/17/2021    HCT 40.7 03/17/2021    MCV 81.5 03/17/2021     (L) 03/17/2021     Lab Results   Component Value Date    INR 0.88 03/16/2021    PROTIME 10.2 03/16/2021     Lab Results   Component Value Date    CREATININE 1.1 03/16/2021    BUN 17 03/16/2021     03/16/2021    K 3.6 03/16/2021    CL 99 03/16/2021    CO2 30 03/16/2021     Lab Results   Component Value Date    ALT 40 03/16/2021    AST 44 (H) 03/16/2021    ALKPHOS 67 03/16/2021    BILITOT 1.1 (H) 03/16/2021       Most recent imaging studies revealed   EXAMINATION:   MRI OF THE BRAIN WITHOUT CONTRAST  3/16/2021 3:22 pm       TECHNIQUE:   Multiplanar multisequence MRI of the brain was performed without the   administration of intravenous contrast.       COMPARISON:   None.       HISTORY:   ORDERING SYSTEM PROVIDED HISTORY: left sided weakness   TECHNOLOGIST PROVIDED HISTORY:   Reason for exam:->left sided weakness   Decision Support Exception->Emergency Medical Condition (MA)       FINDINGS:   INTRACRANIAL STRUCTURES/VENTRICLES: There is an acute lacunar infarct within   the right mid corona radiata extending into the ipsilateral lentiform nucleus   with an additional lacunar infarct located most posteriorly.  No mass effect   or midline shift. No evidence of an acute intracranial hemorrhage.  The   ventricles and sulci are normal in size and configuration.  The   sellar/suprasellar regions appear unremarkable.  The normal signal voids   within the major intracranial vessels appear maintained.       ORBITS: The visualized portion of the orbits demonstrate no acute abnormality.       SINUSES: The visualized paranasal sinuses and mastoid air cells are well   aerated.       BONES/SOFT TISSUES: The bone marrow signal intensity appears normal. The soft   tissues demonstrate no acute abnormality.           Impression   Acute lacunar infarcts within the right MCA vascular territory.      EXAMINATION:   CTA OF THE HEAD AND NECK WITH CONTRAST 3/16/2021 10:31 am:       TECHNIQUE:   CTA of the head and neck was performed with the administration of intravenous   contrast. Multiplanar reformatted images are provided for review.  MIP images   are provided for review. Stenosis of the internal carotid arteries measured   using NASCET criteria. Dose modulation, iterative reconstruction, and/or   weight based adjustment of the mA/kV was utilized to reduce the radiation   dose to as low as reasonably achievable.       COMPARISON:   Noncontrast CT head from earlier today       HISTORY:   ORDERING SYSTEM PROVIDED HISTORY: left sided weakness   TECHNOLOGIST PROVIDED HISTORY:   Reason for exam:->left sided weakness   Decision Support Exception->Emergency Medical Condition (MA)   Reason for Exam: left side weakness   Acuity: Acute   Type of Exam: Initial   Relevant Medical/Surgical History: hx tia       FINDINGS:       CTA NECK:       AORTIC ARCH/ARCH VESSELS: No dissection or arterial injury.  No significant   stenosis of the brachiocephalic or subclavian arteries.       CAROTID ARTERIES: No dissection, arterial injury, or hemodynamically   significant stenosis by NASCET criteria.  There is beading of the mid to   distal bilateral cervical internal carotid arteries, likely related to mild   fibromuscular dysplasia.       VERTEBRAL ARTERIES: No dissection, arterial injury, or significant stenosis. There is mild beading in bilateral distal vertebral arteries, likely related   to mild fibromuscular dysplasia.       SOFT TISSUES: The lung apices are clear.  No cervical or superior mediastinal   lymphadenopathy. Leti Going is mild prominence of the palatine and lingual   tonsils, likely related to fibromuscular dysplasia.  No acute abnormality of   the salivary glands.  There are thyroid nodules measuring up to 1 cm, do not   require follow-up due to small size.       BONES: No acute osseous abnormality.  There are moderate degenerative changes   in the cervical spine.           CTA HEAD:       ANTERIOR CIRCULATION: There is partial occlusion of a M3 branch of the right   MCA (series 2, image 212).   There is 50% stenosis in the distal A2 segment of   the right CORINE.  There is 70% stenosis in distal A2 segment of the left CORINE. No significant stenosis of the intracranial internal carotid, or left middle   cerebral arteries. No aneurysm.       POSTERIOR CIRCULATION: There is 75% stenosis in the proximal V4 segment of   the right vertebral artery.  There is 50% stenosis in the distal P2 segment   of the left PCA.  There is 70% stenosis in the P3 segment of the right PCA. No significant stenosis of the left vertebral or basilar arteries.  No   aneurysm.       OTHER: No dural venous sinus thrombosis on this non-dedicated study.       BRAIN: There is lacunar infarct in the right head of caudate nucleus, likely   chronic.  No definite acute territorial infarction. [place] is minimal   parenchymal volume loss.  There is periventricular white matter low   attenuation, likely related to minimal chronic microvascular disease.  No   mass effect or midline shift. No extra-axial fluid collection.           Impression   Partial occlusion of a M3 branch of the right MCA       50% stenosis in the distal A2 segment of the right CORINE.     70% stenosis in distal A2 segment of the left CORINE.     75% stenosis in the proximal V4 segment of the right vertebral artery.       50% stenosis in the distal P2 segment of the left PCA.       70% stenosis in the P3 segment of the right PCA.       No acute abnormality or flow-limiting stenosis of the major arteries of the   neck.       Beading of the bilateral cervical internal carotid arteries and vertebral   arteries, likely related to mild fibromuscular dysplasia.      EXAMINATION:   CT OF THE HEAD WITHOUT CONTRAST  3/16/2021 10:27 am       TECHNIQUE:   CT of the head was performed without the administration of intravenous   contrast. Dose modulation, iterative reconstruction, and/or weight based   adjustment of the mA/kV was utilized to reduce the radiation dose to as low   as reasonably achievable.       COMPARISON: 7/14/2020 and MRI of the brain 7/15/2020       HISTORY:   ORDERING SYSTEM PROVIDED HISTORY: left side weakness   TECHNOLOGIST PROVIDED HISTORY:   Reason for exam:->left side weakness   Has a \"code stroke\" or \"stroke alert\" been called? ->Yes   Decision Support Exception->Emergency Medical Condition (MA)   Reason for Exam: left side weakness; Acuity: Acute   Type of Exam: Initial   Relevant Medical/Surgical History: hx tia       FINDINGS:   BRAIN/VENTRICLES: Mild motion/streak artifact is present on the examination. The ventricular system is unchanged in appearance.  No evidence of mass   effect or midline shift.  Prominence of sulci overlying convexities of   cerebral hemispheres and cerebellum consistent with atrophy.  Subtle foci of   low attenuation within periventricular/subcortical white matter again   identified.  Finding likely on the basis of changes of minimal ischemic   leukoencephalopathy.  Tiny focus of low attenuation approaching density of   CSF is identified near the head of the right caudate nucleus (image 27). Finding is suggestive of small focal area of remote lacunar infarction which   is a new finding when compared to the study of 7/14/2020.  No other area of   abnormal attenuation of brain parenchyma is identified.  No abnormal   extra-axial fluid collection is identified.  Atherosclerotic calcification of   distal internal carotid and vertebral arteries.       ORBITS: The visualized portion of the orbits demonstrate no acute abnormality.       SINUSES: The visualized paranasal sinuses and mastoid air cells demonstrate   no acute abnormality.       SOFT TISSUES/SKULL:  No acute abnormality of the visualized skull or soft   tissues.           Impression   1. No definite evidence of acute intracranial abnormality. 2. Tiny focus of low attenuation near head of right caudate nucleus.    Findings suggest presence of tiny remote lacunar infarct which is a new   finding when compared to 7/14/2020 as described above. The above laboratory data have been reviewed. The above imaging data have been reviewed. The above medical testing have been reviewed. Body mass index is 31.64 kg/m². Assessment and Plan:  Acute ischemic infarct: ASA, statin. PT/OT/SLP  Diabetes: Uncontrolled  HTN  HLD: significantly elevated triglycerides    Dispo: Patient is an appropriate ARU candidate. Able to tolerate the required 3 hours in an ARU setting. Precert approved. Pending echo today. Will plan for admission tomorrow. We will continue to follow. Thank you for the consultation. Ladonna Amado MD 3/18/2021, 3:08 PM     * This document was created using dictation software. While all precautions were taken to ensure accuracy, errors may have occurred. Please disregard any typographical errors.

## 2021-03-18 NOTE — PROGRESS NOTES
Occupational Therapy  Facility/Department: [facility] C5 - MED SURG/ORTHO  Daily Treatment Note  NAME: Farzaneh Germain  : 1950  MRN: [id number]    Date of Service: 3/18/2021    Discharge Recommendations:  IP Rehab     Assessment   Performance deficits / Impairments: Decreased functional mobility ; Decreased balance;Decreased ADL status; Decreased sensation;Decreased strength;Decreased high-level IADLs  Assessment: Pt pleasant, cooperative and motivated to participate in OT treatment. Pt demos progress with bed mobiliy, transfers, and scooting this date requiring less assistance than previous session. Pt demos the above noted occupational performance deficits following CVA and would benefit from continued skilled OT in IPR setting at d/c. Prognosis: Good  OT Education: OT Role;Plan of Care;Transfer Training;ADL Adaptive Strategies; Home Exercise Program  Patient Education: disease specific: attention to LUE and LUE ROM, ADLs, pivot transfers  REQUIRES OT FOLLOW UP: Yes  Activity Tolerance  Activity Tolerance: Patient Tolerated treatment well  Safety Devices  Safety Devices in place: Yes  Type of devices: Left in chair;Chair alarm in place;Call light within reach;Nurse notified;Gait belt         Patient Diagnosis(es): The primary encounter diagnosis was Cerebrovascular accident (CVA), unspecified mechanism ([address] A diagnosis of Hyperglycemia was also pertinent to this visit. has a past medical history of Diabetes mellitus ([address] GERD (gastroesophageal reflux disease), and Hypertension. has no past surgical history on file.     Restrictions  Restrictions/Precautions  Restrictions/Precautions: General Precautions, Fall Risk  Position Activity Restriction  Other position/activity restrictions: Purewick, telemetry     Subjective   General  Chart Reviewed: Yes  Patient assessed for rehabilitation services?: Yes  Response to previous treatment: Patient with no complaints from previous session  Family / Caregiver Present: No  Referring Practitioner: RAJESH Mendoza CNP  Diagnosis: CVA with Left hemiparesis    Subjective  Subjective: Pt resting in bed, requesting to use commode. Vital Signs  Pulse: 84  BP: (!) 174/89  BP Location: Right upper arm  Patient Currently in Pain: Denies  Oxygen Therapy  SpO2: 99 %  O2 Device: None (Room air)     Orientation  Orientation  Overall Orientation Status: Within Functional Limits     Objective    ADL  LE Dressing: Dependent/Total(pull up briefs)  Toileting: Dependent/Total(pt able to complete bladder hygiene seated with setup A, required 2 person assist to manage clothing over hips)     Balance  Sitting Balance: Contact guard assistance  Standing Balance: Moderate assistance(static to don briefs over hips with RUE on armrest as UE support)    Toilet Transfers  Toilet - Technique: Stand pivot  Equipment Used: Standard bedside commode  Toilet Transfer: Moderate assistance    Bed mobility  Supine to Sit: Minimal assistance(to R with HOB elevated)     Transfers  Stand Pivot Transfers:  Moderate assistance(to pt R, x2 trials)      Cognition  Overall Cognitive Status: WFL     Type of ROM/Therapeutic Exercise  Type of ROM/Therapeutic Exercise: AAROM;AROM  Exercises  Shoulder Elevation: 10x AROM RUE; 10x AAROM LUE  Shoulder Flexion: 10x AROM RUE; 10x AAROM LUE  Elbow Flexion: 10x AROM RUE; 10x AAROM LUE  Elbow Extension: 10x AROM RUE; 10x AAROM LUE  Supination: 10x AROM RUE; 10x AAROM LUE  Pronation: 10x AROM RUE; 10x AAROM LUE  Wrist Flexion: 10x AROM RUE; 10x AAROM LUE  Wrist Extension: 10x AROM RUE; 10x AAROM LUE  Grasp/Release: 10x AROM RUE; 10x AAROM LUE     Plan   Plan  Times per week: 3-5 x/ week  Current Treatment Recommendations: Balance Training, Functional Mobility Training, Safety Education & Training, Positioning, Neuromuscular Re-education, Self-Care / ADL    AM-PAC Score  AM-PAC Inpatient Daily Activity Raw Score: 12 (03/18/21 0940)  AM-PAC Inpatient ADL T-Scale Score : 30.6

## 2021-03-18 NOTE — CARE COORDINATION
Cm received call from Elif in [facility]. They have precert. They will call back with time for pt to go to ARU. Cm notified nurse, Tonia Clements regarding precert. Cm will keep her updated. 2:32 PM  CM received call from Philippe Sosa at [facility]. Pt has order for echo. It needs to be completed and read prior to admission. They will accept pt early tomorrow.

## 2021-03-18 NOTE — PROGRESS NOTES
Jonah Miramontes  Neurology Follow-up  [facility]    Date of Service: 3/18/2021    Subjective:   CC: Follow up today regarding: Acute left-sided weakness and new stroke. Events noted. Chart and lab reviewed. The patient denies any new symptoms today. The same left-sided weakness. Speech is better. No dysphagia. No headache or chest pain or visual changes. Other review of system was unremarkable. ROS : A 10-12 system review obtained and updated today and is unremarkable except as mentioned  in my interval history.      Family history: Noncontributory    Past Medical History:   Diagnosis Date    Diabetes mellitus ([facility] [address]     GERD (gastroesophageal reflux disease)     Hypertension      Current Facility-Administered Medications   Medication Dose Route Frequency Provider Last Rate Last Admin    insulin glargine (LANTUS) injection vial 15 Units  0.2 Units/kg Subcutaneous Nightly Glee Miu, APRN - CNP        perflutren lipid microspheres (DEFINITY) injection 1.65 mg  1.5 mL Intravenous ONCE PRN Glee Miu, APRN - CNP        melatonin disintegrating tablet 5 mg  5 mg Oral Nightly Maria Esther Bright, APRN - CNP   5 mg at 03/17/21 2023    allopurinol (ZYLOPRIM) tablet 300 mg  300 mg Oral Daily Glee Miu, APRN - CNP   300 mg at 03/18/21 5806    aspirin chewable tablet 81 mg  81 mg Oral Daily Glee Miu, APRN - CNP   81 mg at 03/18/21 0916    cetirizine (ZYRTEC) tablet 10 mg  10 mg Oral Daily Glee Miu, APRN - CNP   10 mg at 03/18/21 0916    sodium chloride flush 0.9 % injection 10 mL  10 mL Intravenous 2 times per day Glee Miu, APRN - CNP   10 mL at 03/18/21 0917    sodium chloride flush 0.9 % injection 10 mL  10 mL Intravenous PRN Glee Miu, APRN - CNP        promethazine (PHENERGAN) tablet 12.5 mg  12.5 mg Oral Q6H PRN Glee Miu, APRN - CNP        Or    ondansetron (ZOFRAN) injection 4 mg  4 mg Intravenous Q6H PRN Glee Miu, APRN - CNP attention span and concentration. Language: No aphasia  Good fund of Knowledge. Cranial Nerves:   II: Visual fields: Full. Pupils: equal, round, reactive to light  III,IV,VI: Extra Ocular Movements are intact. No nystagmus  V: Facial sensation is intact  VII: Facial strength and movements: Left facial asymmetry IX: Palate elevation is symmetric  XI: Shoulder shrug is intact  XII: Tongue movements are normal  Musculoskeletal: The same left-sided weakness. No weakness of the right  Tone: Normal tone. Reflexes: Symmetric 2+ in both arms and legs. Coordination: Left pronator drift, no dysmetria with FNF  Sensation: normal to all modalities in both arms and legs. Gait/Posture: Unsteady        Data:  LABS:   Lab Results   Component Value Date     03/16/2021    K 3.6 03/16/2021    K 3.4 07/14/2020    CL 99 03/16/2021    CO2 30 03/16/2021    BUN 17 03/16/2021    CREATININE 1.1 03/16/2021    GFRAA 59 03/16/2021    LABGLOM 49 03/16/2021    GLUCOSE 287 03/16/2021    MG 2.20 07/14/2020    CALCIUM 11.4 03/16/2021     Lab Results   Component Value Date    WBC 4.4 03/17/2021    RBC 5.00 03/17/2021    HGB 14.1 03/17/2021    HCT 40.7 03/17/2021    MCV 81.5 03/17/2021    RDW 14.1 03/17/2021     03/17/2021     Lab Results   Component Value Date    INR 0.88 03/16/2021    PROTIME 10.2 03/16/2021       Neuroimaging was independently reviewed by me and discussed results with the patient and family  I reviewed blood testing and other test results and discussed results with the patient      Impression: No change  Acute right ischemic MCA stroke with residual dysarthria and left-sided weakness. Possible thromboembolic versus cardioembolic  Hypertension, not controlled  Hyperlipidemia  Multiple intracranial stenosis  Diabetes, not controlled.   A1c of 9.0      Recommendation  Echo  PT OT  Speech and swallow evaluation  Inpatient rehab consultation  Aspirin  Statin  DVT and GI prophylaxis  Insulin sliding scale  Blood

## 2021-03-19 ENCOUNTER — HOSPITAL ENCOUNTER (INPATIENT)
Age: 71
LOS: 22 days | Discharge: HOME HEALTH | DRG: 057 | End: 2021-04-10
Attending: PHYSICAL MEDICINE & REHABILITATION | Admitting: PHYSICAL MEDICINE & REHABILITATION
Payer: COMMERCIAL

## 2021-03-19 VITALS
WEIGHT: 162 LBS | HEIGHT: 60 IN | DIASTOLIC BLOOD PRESSURE: 85 MMHG | OXYGEN SATURATION: 95 % | HEART RATE: 101 BPM | TEMPERATURE: 98.8 F | BODY MASS INDEX: 31.8 KG/M2 | RESPIRATION RATE: 16 BRPM | SYSTOLIC BLOOD PRESSURE: 151 MMHG

## 2021-03-19 PROBLEM — I63.9 ACUTE ISCHEMIC STROKE (HCC): Status: ACTIVE | Noted: 2021-03-19

## 2021-03-19 LAB
ANION GAP SERPL CALCULATED.3IONS-SCNC: 9 MMOL/L (ref 3–16)
BASOPHILS ABSOLUTE: 0 K/UL (ref 0–0.2)
BASOPHILS RELATIVE PERCENT: 0.8 %
BUN BLDV-MCNC: 26 MG/DL (ref 7–20)
CALCIUM SERPL-MCNC: 11 MG/DL (ref 8.3–10.6)
CHLORIDE BLD-SCNC: 104 MMOL/L (ref 99–110)
CO2: 26 MMOL/L (ref 21–32)
CREAT SERPL-MCNC: 1.3 MG/DL (ref 0.6–1.2)
EOSINOPHILS ABSOLUTE: 0.1 K/UL (ref 0–0.6)
EOSINOPHILS RELATIVE PERCENT: 2.1 %
GFR AFRICAN AMERICAN: 49
GFR NON-AFRICAN AMERICAN: 40
GLUCOSE BLD-MCNC: 198 MG/DL (ref 70–99)
GLUCOSE BLD-MCNC: 204 MG/DL (ref 70–99)
GLUCOSE BLD-MCNC: 213 MG/DL (ref 70–99)
GLUCOSE BLD-MCNC: 223 MG/DL (ref 70–99)
GLUCOSE BLD-MCNC: 227 MG/DL (ref 70–99)
HCT VFR BLD CALC: 41 % (ref 36–48)
HEMOGLOBIN: 14.3 G/DL (ref 12–16)
LV EF: 65 %
LVEF MODALITY: NORMAL
LYMPHOCYTES ABSOLUTE: 1.4 K/UL (ref 1–5.1)
LYMPHOCYTES RELATIVE PERCENT: 23.8 %
MCH RBC QN AUTO: 28.5 PG (ref 26–34)
MCHC RBC AUTO-ENTMCNC: 34.9 G/DL (ref 31–36)
MCV RBC AUTO: 81.7 FL (ref 80–100)
MONOCYTES ABSOLUTE: 0.4 K/UL (ref 0–1.3)
MONOCYTES RELATIVE PERCENT: 6.3 %
NEUTROPHILS ABSOLUTE: 3.9 K/UL (ref 1.7–7.7)
NEUTROPHILS RELATIVE PERCENT: 67 %
PDW BLD-RTO: 14.3 % (ref 12.4–15.4)
PERFORMED ON: ABNORMAL
PLATELET # BLD: 101 K/UL (ref 135–450)
PMV BLD AUTO: 7.7 FL (ref 5–10.5)
POTASSIUM SERPL-SCNC: 3.3 MMOL/L (ref 3.5–5.1)
RBC # BLD: 5.01 M/UL (ref 4–5.2)
SODIUM BLD-SCNC: 139 MMOL/L (ref 136–145)
WBC # BLD: 5.7 K/UL (ref 4–11)

## 2021-03-19 PROCEDURE — 1280000000 HC REHAB R&B

## 2021-03-19 PROCEDURE — 85025 COMPLETE CBC W/AUTO DIFF WBC: CPT

## 2021-03-19 PROCEDURE — 6370000000 HC RX 637 (ALT 250 FOR IP): Performed by: NURSE PRACTITIONER

## 2021-03-19 PROCEDURE — 6360000002 HC RX W HCPCS: Performed by: NURSE PRACTITIONER

## 2021-03-19 PROCEDURE — 6370000000 HC RX 637 (ALT 250 FOR IP): Performed by: INTERNAL MEDICINE

## 2021-03-19 PROCEDURE — 6370000000 HC RX 637 (ALT 250 FOR IP): Performed by: PHYSICAL MEDICINE & REHABILITATION

## 2021-03-19 PROCEDURE — 97112 NEUROMUSCULAR REEDUCATION: CPT

## 2021-03-19 PROCEDURE — 97535 SELF CARE MNGMENT TRAINING: CPT

## 2021-03-19 PROCEDURE — 99232 SBSQ HOSP IP/OBS MODERATE 35: CPT | Performed by: PSYCHIATRY & NEUROLOGY

## 2021-03-19 PROCEDURE — 36415 COLL VENOUS BLD VENIPUNCTURE: CPT

## 2021-03-19 PROCEDURE — C8929 TTE W OR WO FOL WCON,DOPPLER: HCPCS

## 2021-03-19 PROCEDURE — 6360000004 HC RX CONTRAST MEDICATION: Performed by: NURSE PRACTITIONER

## 2021-03-19 PROCEDURE — 2580000003 HC RX 258: Performed by: NURSE PRACTITIONER

## 2021-03-19 PROCEDURE — 80048 BASIC METABOLIC PNL TOTAL CA: CPT

## 2021-03-19 RX ORDER — AMLODIPINE BESYLATE 5 MG/1
5 TABLET ORAL DAILY
Status: DISCONTINUED | OUTPATIENT
Start: 2021-03-20 | End: 2021-03-22

## 2021-03-19 RX ORDER — AMLODIPINE BESYLATE 5 MG/1
5 TABLET ORAL DAILY
Status: DISCONTINUED | OUTPATIENT
Start: 2021-03-19 | End: 2021-03-19 | Stop reason: HOSPADM

## 2021-03-19 RX ORDER — ACETAMINOPHEN 325 MG/1
650 TABLET ORAL EVERY 4 HOURS PRN
Status: DISCONTINUED | OUTPATIENT
Start: 2021-03-19 | End: 2021-04-10 | Stop reason: HOSPADM

## 2021-03-19 RX ORDER — DEXTROSE MONOHYDRATE 50 MG/ML
100 INJECTION, SOLUTION INTRAVENOUS PRN
Status: DISCONTINUED | OUTPATIENT
Start: 2021-03-19 | End: 2021-04-10 | Stop reason: HOSPADM

## 2021-03-19 RX ORDER — POLYETHYLENE GLYCOL 3350 17 G/17G
17 POWDER, FOR SOLUTION ORAL DAILY PRN
Status: DISCONTINUED | OUTPATIENT
Start: 2021-03-19 | End: 2021-04-10 | Stop reason: HOSPADM

## 2021-03-19 RX ORDER — ASPIRIN 81 MG/1
81 TABLET, CHEWABLE ORAL DAILY
Status: CANCELLED | OUTPATIENT
Start: 2021-03-20

## 2021-03-19 RX ORDER — DIPHENHYDRAMINE HCL 25 MG
25 TABLET ORAL EVERY 6 HOURS PRN
Status: CANCELLED | OUTPATIENT
Start: 2021-03-19

## 2021-03-19 RX ORDER — AMLODIPINE BESYLATE 5 MG/1
5 TABLET ORAL DAILY
Status: CANCELLED | OUTPATIENT
Start: 2021-03-20

## 2021-03-19 RX ORDER — MECOBALAMIN 5000 MCG
5 TABLET,DISINTEGRATING ORAL NIGHTLY
Status: CANCELLED | OUTPATIENT
Start: 2021-03-19

## 2021-03-19 RX ORDER — DIPHENHYDRAMINE HCL 25 MG
25 TABLET ORAL EVERY 6 HOURS PRN
Status: DISCONTINUED | OUTPATIENT
Start: 2021-03-19 | End: 2021-04-10 | Stop reason: HOSPADM

## 2021-03-19 RX ORDER — PROPRANOLOL HYDROCHLORIDE 40 MG/1
80 TABLET ORAL DAILY
Status: CANCELLED | OUTPATIENT
Start: 2021-03-20

## 2021-03-19 RX ORDER — CETIRIZINE HYDROCHLORIDE 10 MG/1
10 TABLET ORAL DAILY
Status: DISCONTINUED | OUTPATIENT
Start: 2021-03-20 | End: 2021-04-10 | Stop reason: HOSPADM

## 2021-03-19 RX ORDER — PROPRANOLOL HYDROCHLORIDE 40 MG/1
80 TABLET ORAL DAILY
Status: DISCONTINUED | OUTPATIENT
Start: 2021-03-20 | End: 2021-04-10 | Stop reason: HOSPADM

## 2021-03-19 RX ORDER — ONDANSETRON 4 MG/1
4 TABLET, ORALLY DISINTEGRATING ORAL EVERY 8 HOURS PRN
Status: CANCELLED | OUTPATIENT
Start: 2021-03-19

## 2021-03-19 RX ORDER — NICOTINE POLACRILEX 4 MG
15 LOZENGE BUCCAL PRN
Status: CANCELLED | OUTPATIENT
Start: 2021-03-19

## 2021-03-19 RX ORDER — ASPIRIN 81 MG/1
81 TABLET, CHEWABLE ORAL DAILY
Status: DISCONTINUED | OUTPATIENT
Start: 2021-03-20 | End: 2021-04-10 | Stop reason: HOSPADM

## 2021-03-19 RX ORDER — ATORVASTATIN CALCIUM 80 MG/1
80 TABLET, FILM COATED ORAL NIGHTLY
Status: DISCONTINUED | OUTPATIENT
Start: 2021-03-19 | End: 2021-04-10 | Stop reason: HOSPADM

## 2021-03-19 RX ORDER — TRAZODONE HYDROCHLORIDE 50 MG/1
50 TABLET ORAL NIGHTLY PRN
Status: DISCONTINUED | OUTPATIENT
Start: 2021-03-19 | End: 2021-04-10 | Stop reason: HOSPADM

## 2021-03-19 RX ORDER — ALLOPURINOL 300 MG/1
300 TABLET ORAL DAILY
Status: CANCELLED | OUTPATIENT
Start: 2021-03-20

## 2021-03-19 RX ORDER — HYDRALAZINE HYDROCHLORIDE 25 MG/1
50 TABLET, FILM COATED ORAL EVERY 8 HOURS PRN
Status: DISCONTINUED | OUTPATIENT
Start: 2021-03-19 | End: 2021-04-10 | Stop reason: HOSPADM

## 2021-03-19 RX ORDER — INSULIN GLARGINE 100 [IU]/ML
20 INJECTION, SOLUTION SUBCUTANEOUS NIGHTLY
Status: DISCONTINUED | OUTPATIENT
Start: 2021-03-19 | End: 2021-04-10 | Stop reason: HOSPADM

## 2021-03-19 RX ORDER — POLYETHYLENE GLYCOL 3350 17 G/17G
17 POWDER, FOR SOLUTION ORAL DAILY PRN
Status: CANCELLED | OUTPATIENT
Start: 2021-03-19

## 2021-03-19 RX ORDER — CETIRIZINE HYDROCHLORIDE 10 MG/1
10 TABLET ORAL DAILY
Status: CANCELLED | OUTPATIENT
Start: 2021-03-20

## 2021-03-19 RX ORDER — SODIUM CHLORIDE 0.9 % (FLUSH) 0.9 %
10 SYRINGE (ML) INJECTION PRN
Status: CANCELLED | OUTPATIENT
Start: 2021-03-19

## 2021-03-19 RX ORDER — PROPRANOLOL HYDROCHLORIDE 40 MG/1
80 TABLET ORAL DAILY
Status: DISCONTINUED | OUTPATIENT
Start: 2021-03-19 | End: 2021-03-19 | Stop reason: HOSPADM

## 2021-03-19 RX ORDER — ACETAMINOPHEN 325 MG/1
650 TABLET ORAL EVERY 4 HOURS PRN
Status: CANCELLED | OUTPATIENT
Start: 2021-03-19

## 2021-03-19 RX ORDER — INSULIN GLARGINE 100 [IU]/ML
18 INJECTION, SOLUTION SUBCUTANEOUS NIGHTLY
Qty: 1 VIAL | Refills: 3 | DISCHARGE
Start: 2021-03-19

## 2021-03-19 RX ORDER — TRAZODONE HYDROCHLORIDE 50 MG/1
50 TABLET ORAL NIGHTLY PRN
Status: CANCELLED | OUTPATIENT
Start: 2021-03-19

## 2021-03-19 RX ORDER — INSULIN GLARGINE 100 [IU]/ML
20 INJECTION, SOLUTION SUBCUTANEOUS NIGHTLY
Status: CANCELLED | OUTPATIENT
Start: 2021-03-19

## 2021-03-19 RX ORDER — ACETAMINOPHEN 325 MG/1
650 TABLET ORAL EVERY 4 HOURS PRN
Status: DISCONTINUED | OUTPATIENT
Start: 2021-03-19 | End: 2021-03-19

## 2021-03-19 RX ORDER — SODIUM CHLORIDE 0.9 % (FLUSH) 0.9 %
10 SYRINGE (ML) INJECTION EVERY 12 HOURS SCHEDULED
Status: CANCELLED | OUTPATIENT
Start: 2021-03-19

## 2021-03-19 RX ORDER — ACETAMINOPHEN 325 MG/1
650 TABLET ORAL EVERY 4 HOURS PRN
Status: DISCONTINUED | OUTPATIENT
Start: 2021-03-19 | End: 2021-03-19 | Stop reason: HOSPADM

## 2021-03-19 RX ORDER — TRAMADOL HYDROCHLORIDE 50 MG/1
50 TABLET ORAL EVERY 6 HOURS PRN
Status: CANCELLED | OUTPATIENT
Start: 2021-03-19

## 2021-03-19 RX ORDER — DEXTROSE MONOHYDRATE 25 G/50ML
12.5 INJECTION, SOLUTION INTRAVENOUS PRN
Status: DISCONTINUED | OUTPATIENT
Start: 2021-03-19 | End: 2021-04-10 | Stop reason: HOSPADM

## 2021-03-19 RX ORDER — HYDRALAZINE HYDROCHLORIDE 50 MG/1
50 TABLET, FILM COATED ORAL EVERY 8 HOURS PRN
Status: CANCELLED | OUTPATIENT
Start: 2021-03-19

## 2021-03-19 RX ORDER — TRAMADOL HYDROCHLORIDE 50 MG/1
50 TABLET ORAL EVERY 6 HOURS PRN
Status: DISCONTINUED | OUTPATIENT
Start: 2021-03-19 | End: 2021-04-10 | Stop reason: HOSPADM

## 2021-03-19 RX ORDER — INSULIN GLARGINE 100 [IU]/ML
18 INJECTION, SOLUTION SUBCUTANEOUS NIGHTLY
Status: DISCONTINUED | OUTPATIENT
Start: 2021-03-19 | End: 2021-03-19 | Stop reason: HOSPADM

## 2021-03-19 RX ORDER — ONDANSETRON 4 MG/1
4 TABLET, ORALLY DISINTEGRATING ORAL EVERY 8 HOURS PRN
Status: DISCONTINUED | OUTPATIENT
Start: 2021-03-19 | End: 2021-04-10 | Stop reason: HOSPADM

## 2021-03-19 RX ORDER — SODIUM CHLORIDE 0.9 % (FLUSH) 0.9 %
10 SYRINGE (ML) INJECTION EVERY 12 HOURS SCHEDULED
Status: DISCONTINUED | OUTPATIENT
Start: 2021-03-19 | End: 2021-03-28

## 2021-03-19 RX ORDER — POLYETHYLENE GLYCOL 3350 17 G/17G
17 POWDER, FOR SOLUTION ORAL DAILY PRN
Qty: 527 G | Refills: 1 | DISCHARGE
Start: 2021-03-19 | End: 2021-04-18

## 2021-03-19 RX ORDER — NICOTINE POLACRILEX 4 MG
15 LOZENGE BUCCAL PRN
Status: DISCONTINUED | OUTPATIENT
Start: 2021-03-19 | End: 2021-04-10 | Stop reason: HOSPADM

## 2021-03-19 RX ORDER — DEXTROSE MONOHYDRATE 25 G/50ML
12.5 INJECTION, SOLUTION INTRAVENOUS PRN
Status: CANCELLED | OUTPATIENT
Start: 2021-03-19

## 2021-03-19 RX ORDER — ATORVASTATIN CALCIUM 80 MG/1
80 TABLET, FILM COATED ORAL NIGHTLY
Status: CANCELLED | OUTPATIENT
Start: 2021-03-19

## 2021-03-19 RX ORDER — MECOBALAMIN 5000 MCG
5 TABLET,DISINTEGRATING ORAL NIGHTLY
Status: DISCONTINUED | OUTPATIENT
Start: 2021-03-19 | End: 2021-04-10 | Stop reason: HOSPADM

## 2021-03-19 RX ORDER — DEXTROSE MONOHYDRATE 50 MG/ML
100 INJECTION, SOLUTION INTRAVENOUS PRN
Status: CANCELLED | OUTPATIENT
Start: 2021-03-19

## 2021-03-19 RX ORDER — ALLOPURINOL 300 MG/1
300 TABLET ORAL DAILY
Status: DISCONTINUED | OUTPATIENT
Start: 2021-03-20 | End: 2021-04-10 | Stop reason: HOSPADM

## 2021-03-19 RX ORDER — SODIUM CHLORIDE 0.9 % (FLUSH) 0.9 %
10 SYRINGE (ML) INJECTION PRN
Status: DISCONTINUED | OUTPATIENT
Start: 2021-03-19 | End: 2021-04-10 | Stop reason: HOSPADM

## 2021-03-19 RX ADMIN — ALLOPURINOL 300 MG: 300 TABLET ORAL at 08:39

## 2021-03-19 RX ADMIN — Medication 10 ML: at 08:34

## 2021-03-19 RX ADMIN — INSULIN LISPRO 4 UNITS: 100 INJECTION, SOLUTION INTRAVENOUS; SUBCUTANEOUS at 16:08

## 2021-03-19 RX ADMIN — AMLODIPINE BESYLATE 5 MG: 5 TABLET ORAL at 13:57

## 2021-03-19 RX ADMIN — ONDANSETRON 4 MG: 2 INJECTION INTRAMUSCULAR; INTRAVENOUS at 12:35

## 2021-03-19 RX ADMIN — ACETAMINOPHEN 650 MG: 325 TABLET ORAL at 06:24

## 2021-03-19 RX ADMIN — INSULIN LISPRO 4 UNITS: 100 INJECTION, SOLUTION INTRAVENOUS; SUBCUTANEOUS at 08:39

## 2021-03-19 RX ADMIN — CETIRIZINE HYDROCHLORIDE 10 MG: 10 TABLET, FILM COATED ORAL at 08:39

## 2021-03-19 RX ADMIN — ATORVASTATIN CALCIUM 80 MG: 80 TABLET, FILM COATED ORAL at 21:05

## 2021-03-19 RX ADMIN — INSULIN LISPRO 1 UNITS: 100 INJECTION, SOLUTION INTRAVENOUS; SUBCUTANEOUS at 21:05

## 2021-03-19 RX ADMIN — PERFLUTREN 1.65 MG: 6.52 INJECTION, SUSPENSION INTRAVENOUS at 11:16

## 2021-03-19 RX ADMIN — ASPIRIN 81 MG: 81 TABLET, CHEWABLE ORAL at 08:39

## 2021-03-19 RX ADMIN — INSULIN LISPRO 4 UNITS: 100 INJECTION, SOLUTION INTRAVENOUS; SUBCUTANEOUS at 12:08

## 2021-03-19 RX ADMIN — PROPRANOLOL HYDROCHLORIDE 80 MG: 40 TABLET ORAL at 13:57

## 2021-03-19 RX ADMIN — INSULIN GLARGINE 20 UNITS: 100 INJECTION, SOLUTION SUBCUTANEOUS at 21:06

## 2021-03-19 RX ADMIN — ENOXAPARIN SODIUM 40 MG: 40 INJECTION SUBCUTANEOUS at 08:42

## 2021-03-19 RX ADMIN — Medication 5 MG: at 21:05

## 2021-03-19 ASSESSMENT — PAIN DESCRIPTION - LOCATION: LOCATION: BACK

## 2021-03-19 ASSESSMENT — PAIN SCALES - GENERAL
PAINLEVEL_OUTOF10: 4
PAINLEVEL_OUTOF10: 0

## 2021-03-19 ASSESSMENT — PAIN DESCRIPTION - DESCRIPTORS: DESCRIPTORS: ACHING

## 2021-03-19 ASSESSMENT — PAIN DESCRIPTION - PAIN TYPE: TYPE: CHRONIC PAIN

## 2021-03-19 ASSESSMENT — PAIN DESCRIPTION - ORIENTATION: ORIENTATION: LOWER;MID

## 2021-03-19 NOTE — PROGRESS NOTES
Patient admitted to room 156, oriented to room, bed, call light, schedules, menu, room safety. Family at bed side. VSS. Patient eating dinner. Will call for assistance.

## 2021-03-19 NOTE — DISCHARGE SUMMARY
Hospital Medicine Discharge Summary    Patient: Angela Chong     Age: 69 y.o. Gender: female  : 1950   MRN: [id number]  Code Status: Full     Admit Date: 3/16/2021   Discharge Date: 3/19/2021    Disposition:  Inpatient Rehab ([facility] ARU)    Condition at Discharge: Stable    Primary Care Provider: Sara Garcia MD    Admitting Physician: Aaron Interiano MD  Discharge Physician: Aaron Interiano MD       Discharge Diagnoses: Active Hospital Problems    Diagnosis    Cerebrovascular accident (CVA) ([facility] [address] [I63.9]    DM (diabetes mellitus), secondary, uncontrolled, w/neurologic complic ([facility] [address] [Y80.99, F01.04]    Acute left-sided weakness [R53.1]    HTN (hypertension), benign [I10]       Hospital Course:   69 y.o. female, with a PMH of DM2, HTN, and TIA, who presented to [facility] with left sided weakness. The patient went to bed around 2230 in her usual state of health. She woke up at 0330 and had difficulty walking. She waited to see if her symptoms passed and finally came to the ED around 1000 this AM.  The  stroke team was notified, but unfortunately, she was out of the TPA window. She denies fever, chills, chest pain, palpitations, abdominal pain, N/V/D. Assessment/Plan:    Acute left-sided weakness - likely acute CVA. -  stroke team contacted from the ED, but unfortunately, patient was out of the TPA window. - CT head negative. - CTA head/neck - segments of bilateral MCA, CORINE, and PCA with stenosis. BICA without stenosis. - MRI brain - acute lacunar infarcts within the right MCA territory. - ECHO stable  -tele negative  - PT/OT/SLP evaluation. - continue home ASA. Atorvastatin started. - A1c 9, LDL 79.  - neurology consulted. - Transfer to ARU     DM2, uncontrolled. - A1c 9.    - Patient wishes for alternate agent at dc as metformin causes her diarrhea.   - Continue basal bolus regimen for now     HTN - uncontrolled on arrival.  Could be contributing to injection vial  Inject 18 Units into the skin nightly             insulin lispro (HUMALOG) 100 UNIT/ML injection vial  Inject 0-12 Units into the skin 3 times daily (with meals)             insulin lispro (HUMALOG) 100 UNIT/ML injection vial  Inject 0-6 Units into the skin nightly             polyethylene glycol (GLYCOLAX) 17 g packet  Take 17 g by mouth daily as needed for Constipation             propranolol (INDERAL) 80 MG tablet  Take 80 mg by mouth nightly                  Significant Test Results    Echo Complete    Result Date: 3/19/2021  Transthoracic Echocardiography Report (TTE)  Demographics   Patient Name       Mian Pemberton   Date of Study      03/19/2021         Gender               Female   Patient Number     [id number]         Date of Birth        1950   Visit Number       [id number]          Age                  69 year(s)   Accession Number   [id number]         Room Number          7176   Corporate ID       [id number]           Sonographer          RT Mikala   Ordering Physician Mary Grace Alanis,  Interpreting         [address] [place] [address].                     CNP                Physician            Vivian Phillips MD  Procedure Type of Study   TTE procedure:ECHOCARDIOGRAM COMPLETE 2D W DOPPLER W COLOR. Procedure Date Date: 03/19/2021 Start: 10:52 AM Study Location: [address] [place] [address] - Echo Lab Technical Quality: Adequate visualization Indications:CVA. Patient Status: Routine Contrast Medium: Definity. Height: 60 inches Weight: 162 pounds BSA: 1.71 m2 BMI: 31.64 kg/m2 BP: 144/72 mmHg  Conclusions   Summary  Patient tachycardic throughout the study. Small left ventricle with mild concentric left ventricular hypertrophy. Hyperdynamic left ventricular systolic function with ejection fraction of  >65%. No regional wall motion abnormalites are seen. Left ventricular diastolic filling pressure is elevated lateral E/e\" is 14 . The right ventricle is normal in size and function.   No significant valvular disease identified. No bubble study performed. Compared to previous study from 7-, LV function now appears  hyperdynamic. No bubble study performed during this examination but noted  negative bubble study by the prior study. Signature   ------------------------------------------------------------------  Electronically signed by Paramjit Daniel MD (Interpreting  physician) on 03/19/2021 at 12:13 PM  ------------------------------------------------------------------   Findings   Left Ventricle  Small left ventricle with mild concentric left ventricular hypertrophy. Hyperdynamic left ventricular systolic function with ejection fraction of  >65%. No regional wall motion abnormalites are seen. Left ventricular diastolic filling pressure is elevated lateral E/e\" is 14 . Mitral Valve  Mild thickening of leaflets of mitral valve. Mild mitral annular calcification. No mitral stenosis. Trivial mitral regurgitation. Left Atrium  The left atrium is normal in size. Aortic Valve  Aortic valve appears sclerotic but opens adequately. No evidence of aortic valve regurgitation. Aorta  The aortic root is normal in size. Right Ventricle  The right ventricle is normal in size and function. TAPSE is measured at 17 mm. S'prime velocity is measured at 20 cm/s. Tricuspid Valve  Tricuspid valve is structurally normal.  No tricuspid regurgitation to estimate systolic pulmonary artery pressure  (SPAP). Right Atrium  The right atrium is normal in size at 8 cm2. Pulmonic Valve  The pulmonic valve is not well visualized. No evidence of pulmonic valve regurgitation. Pericardial Effusion  No pericardial effusion noted. Pleural Effusion  No pleural effusion. Miscellaneous  IVC size is normal (<2.1cm) and collapses > 50% with respiration consistent  with normal RA pressure (3mmHg). Patient tachy during study.   M-Mode/2D Measurements (cm)   LV Diastolic Dimension: 9.03 cm LV Systolic Dimension: 2.13 cm  LV Septum Diastolic: 3.21 cm  LV PW Diastolic: 9.52 cm        AO Root Dimension: 2.3 cm                                  AV Cusp Separation: 1.6 cm                                  LA Dimension: 2.8 cm                                  LA Area: 10.95 cm2                                  LA volume/Index: 23.67 ml /14 ml/m2  Doppler Measurements   AV Peak Velocity: 130 cm/s     MV Peak E-Wave: 71.1 cm/s  AV Peak Gradient: 6.76 mmHg    MV Peak A-Wave: 116 cm/s                                 MV E/A Ratio: 0.61   E' Septal Velocity: 10.9 cm/s  E' Lateral Velocity: 14.4 cm/s  E/E' ratio: 5.7  PV Peak Velocity: 62.4 cm/s  PV Peak Gradient: 1.56 mmHg   Aortic Valve   Peak Velocity: 130 cm/s  Peak Gradient: 6.76 mmHg   Cusp Separation: 1.6 cm  Aorta   Aortic Root: 2.3 cm      Ct Head Wo Contrast    Result Date: 3/17/2021  EXAMINATION: CT OF THE HEAD WITHOUT CONTRAST  3/16/2021 10:27 am TECHNIQUE: CT of the head was performed without the administration of intravenous contrast. Dose modulation, iterative reconstruction, and/or weight based adjustment of the mA/kV was utilized to reduce the radiation dose to as low as reasonably achievable. COMPARISON: 7/14/2020 and MRI of the brain 7/15/2020 HISTORY: ORDERING SYSTEM PROVIDED HISTORY: left side weakness TECHNOLOGIST PROVIDED HISTORY: Reason for exam:->left side weakness Has a \"code stroke\" or \"stroke alert\" been called? ->Yes Decision Support Exception->Emergency Medical Condition (MA) Reason for Exam: left side weakness; Acuity: Acute Type of Exam: Initial Relevant Medical/Surgical History: hx tia FINDINGS: BRAIN/VENTRICLES: Mild motion/streak artifact is present on the examination. The ventricular system is unchanged in appearance. No evidence of mass effect or midline shift. Prominence of sulci overlying convexities of cerebral hemispheres and cerebellum consistent with atrophy.   Subtle foci of low attenuation within periventricular/subcortical white matter again identified. Finding likely on the basis of changes of minimal ischemic leukoencephalopathy. Tiny focus of low attenuation approaching density of CSF is identified near the head of the right caudate nucleus (image 27). Finding is suggestive of small focal area of remote lacunar infarction which is a new finding when compared to the study of 7/14/2020. No other area of abnormal attenuation of brain parenchyma is identified. No abnormal extra-axial fluid collection is identified. Atherosclerotic calcification of distal internal carotid and vertebral arteries. ORBITS: The visualized portion of the orbits demonstrate no acute abnormality. SINUSES: The visualized paranasal sinuses and mastoid air cells demonstrate no acute abnormality. SOFT TISSUES/SKULL:  No acute abnormality of the visualized skull or soft tissues. 1. No definite evidence of acute intracranial abnormality. 2. Tiny focus of low attenuation near head of right caudate nucleus. Findings suggest presence of tiny remote lacunar infarct which is a new finding when compared to 7/14/2020 as described above. Critical results were called by Og Leigh. Velvet Rothman MD to Kiki Jamison on 3/16/2021 at 10:46. Xr Chest Portable    Result Date: 3/16/2021  EXAMINATION: ONE XRAY VIEW OF THE CHEST 3/16/2021 10:43 am COMPARISON: 07/14/2020 HISTORY: ORDERING SYSTEM PROVIDED HISTORY: stroke symptoms TECHNOLOGIST PROVIDED HISTORY: Reason for exam:->stroke symptoms Reason for Exam: stroke symptoms Acuity: Acute Type of Exam: Initial FINDINGS: Single portable frontal view of the chest is submitted for review. The cardiac silhouette is normal in size. Lung parenchyma is clear without focal airspace consolidation, sizeable pleural effusion, or pneumothorax. Trachea is midline. Visualized osseous structures and soft tissues are grossly intact. No acute cardiopulmonary pathology.      Cta Head Neck W Contrast    Result Date: 3/16/2021  EXAMINATION: CTA OF THE HEAD AND NECK WITH CONTRAST 3/16/2021 10:31 am: TECHNIQUE: CTA of the head and neck was performed with the administration of intravenous contrast. Multiplanar reformatted images are provided for review. MIP images are provided for review. Stenosis of the internal carotid arteries measured using NASCET criteria. Dose modulation, iterative reconstruction, and/or weight based adjustment of the mA/kV was utilized to reduce the radiation dose to as low as reasonably achievable. COMPARISON: Noncontrast CT head from earlier today HISTORY: ORDERING SYSTEM PROVIDED HISTORY: left sided weakness TECHNOLOGIST PROVIDED HISTORY: Reason for exam:->left sided weakness Decision Support Exception->Emergency Medical Condition (MA) Reason for Exam: left side weakness Acuity: Acute Type of Exam: Initial Relevant Medical/Surgical History: hx tia FINDINGS: CTA NECK: AORTIC ARCH/ARCH VESSELS: No dissection or arterial injury. No significant stenosis of the brachiocephalic or subclavian arteries. CAROTID ARTERIES: No dissection, arterial injury, or hemodynamically significant stenosis by NASCET criteria. There is beading of the mid to distal bilateral cervical internal carotid arteries, likely related to mild fibromuscular dysplasia. VERTEBRAL ARTERIES: No dissection, arterial injury, or significant stenosis. There is mild beading in bilateral distal vertebral arteries, likely related to mild fibromuscular dysplasia. SOFT TISSUES: The lung apices are clear. No cervical or superior mediastinal lymphadenopathy. There is mild prominence of the palatine and lingual tonsils, likely related to fibromuscular dysplasia. No acute abnormality of the salivary glands. There are thyroid nodules measuring up to 1 cm, do not require follow-up due to small size. BONES: No acute osseous abnormality. There are moderate degenerative changes in the cervical spine.  CTA HEAD: ANTERIOR CIRCULATION: There is partial occlusion of a M3 branch of the right MCA (series 2, image 212). There is 50% stenosis in the distal A2 segment of the right CORINE. There is 70% stenosis in distal A2 segment of the left CORINE. No significant stenosis of the intracranial internal carotid, or left middle cerebral arteries. No aneurysm. POSTERIOR CIRCULATION: There is 75% stenosis in the proximal V4 segment of the right vertebral artery. There is 50% stenosis in the distal P2 segment of the left PCA. There is 70% stenosis in the P3 segment of the right PCA. No significant stenosis of the left vertebral or basilar arteries. No aneurysm. OTHER: No dural venous sinus thrombosis on this non-dedicated study. BRAIN: There is lacunar infarct in the right head of caudate nucleus, likely chronic. No definite acute territorial infarction. There is minimal parenchymal volume loss. There is periventricular white matter low attenuation, likely related to minimal chronic microvascular disease. No mass effect or midline shift. No extra-axial fluid collection. Partial occlusion of a M3 branch of the right MCA 50% stenosis in the distal A2 segment of the right CORINE. 70% stenosis in distal A2 segment of the left CORINE. 75% stenosis in the proximal V4 segment of the right vertebral artery. 50% stenosis in the distal P2 segment of the left PCA. 70% stenosis in the P3 segment of the right PCA. No acute abnormality or flow-limiting stenosis of the major arteries of the neck. Beading of the bilateral cervical internal carotid arteries and vertebral arteries, likely related to mild fibromuscular dysplasia. Mri Brain Without Contrast    Result Date: 3/16/2021  EXAMINATION: MRI OF THE BRAIN WITHOUT CONTRAST  3/16/2021 3:22 pm TECHNIQUE: Multiplanar multisequence MRI of the brain was performed without the administration of intravenous contrast. COMPARISON: None.  HISTORY: ORDERING SYSTEM PROVIDED HISTORY: left sided weakness TECHNOLOGIST PROVIDED HISTORY: Reason for exam:->left sided weakness Decision Support Exception->Emergency Medical Condition (MA) FINDINGS: INTRACRANIAL STRUCTURES/VENTRICLES: There is an acute lacunar infarct within the right mid corona radiata extending into the ipsilateral lentiform nucleus with an additional lacunar infarct located most posteriorly. No mass effect or midline shift. No evidence of an acute intracranial hemorrhage. The ventricles and sulci are normal in size and configuration. The sellar/suprasellar regions appear unremarkable. The normal signal voids within the major intracranial vessels appear maintained. ORBITS: The visualized portion of the orbits demonstrate no acute abnormality. SINUSES: The visualized paranasal sinuses and mastoid air cells are well aerated. BONES/SOFT TISSUES: The bone marrow signal intensity appears normal. The soft tissues demonstrate no acute abnormality. Acute lacunar infarcts within the right MCA vascular territory. The findings were sent to the Radiology Results [address] at 4:23 pm on 3/16/2021to be communicated to a licensed caregiver. Consults:     IP CONSULT TO STROKE TEAM  IP CONSULT TO HOSPITALIST  IP CONSULT TO NEUROLOGY  IP CONSULT TO SPIRITUAL SERVICES  IP CONSULT TO PHYSICAL MEDICINE REHAB    Labs:  For convenience and continuity at follow-up the following most recent labs are provided:    Lab Results   Component Value Date    WBC 4.0 03/31/2021    HGB 13.6 03/31/2021    HCT 39.4 03/31/2021    MCV 81.9 03/31/2021     03/31/2021     03/31/2021    K 3.8 03/31/2021    K 3.4 07/14/2020     03/31/2021    CO2 27 03/31/2021    BUN 23 03/31/2021    CREATININE 1.1 03/31/2021    CALCIUM 11.1 03/31/2021    TROPONINI <0.01 03/16/2021    ALKPHOS 67 03/16/2021    ALT 40 03/16/2021    AST 44 03/16/2021    BILITOT 1.1 03/16/2021    LABALBU 5.1 03/16/2021    LDLCALC see below 03/17/2021    TRIG 512 03/17/2021    LABA1C 9.0 03/17/2021     Lab Results   Component Value Date    INR 0.88 03/16/2021 The patient was seen and examined on day of discharge and this discharge summary is in conjunction with any daily progress note from day of discharge. Time spent on discharge is more than 30 minutes in the examination, evaluation, counseling and review of medications and discharge plan. Signed:    Ange Covington MD   3/31/2021    Thank you Alessandro Horne MD for the opportunity to be involved in this patient's care. If you have any questions or concerns please feel free to contact my office [phone].

## 2021-03-19 NOTE — PROGRESS NOTES
Hospitalist Progress Note      PCP: Jasen Sanchez MD    Date of Admission: 3/16/2021    Chief Complaint: Left sided weakness    Hospital Course: 69 y.o. female, with a PMH of DM2, HTN, and TIA, who presented to [facility] with left sided weakness. The patient went to bed around 2230 in her usual state of health. She woke up at 0330 and had difficulty walking. She waited to see if her symptoms passed and finally came to the ED around 1000 this AM.  The  stroke team was notified, but unfortunately, she was out of the TPA window. She denies fever, chills, chest pain, palpitations, abdominal pain, N/V/D. Subjective: No real changes. Believes her left leg is somewhat stronger. Medications:  Reviewed    Infusion Medications    dextrose       Scheduled Medications    insulin glargine  0.2 Units/kg Subcutaneous Nightly    melatonin  5 mg Oral Nightly    allopurinol  300 mg Oral Daily    aspirin  81 mg Oral Daily    cetirizine  10 mg Oral Daily    sodium chloride flush  10 mL Intravenous 2 times per day    enoxaparin  40 mg Subcutaneous Daily    atorvastatin  80 mg Oral Nightly    insulin lispro  0-12 Units Subcutaneous TID WC    insulin lispro  0-6 Units Subcutaneous Nightly     PRN Meds: perflutren lipid microspheres, sodium chloride flush, promethazine **OR** ondansetron, polyethylene glycol, labetalol, glucose, dextrose, glucagon (rDNA), dextrose    No intake or output data in the 24 hours ending 03/18/21 3824    Physical Exam Performed:    BP (!) 169/90   Pulse 73   Temp 98 °F (36.7 °C) (Oral)   Resp 16   Ht 5' (1.524 m)   Wt 162 lb (73.5 kg)   SpO2 97%   BMI 31.64 kg/m²     General appearance: No apparent distress, appears stated age and cooperative. HEENT: Pupils equal, round, and reactive to light. Conjunctivae/corneas clear. Neck: Supple, with full range of motion. No jugular venous distention. Trachea midline. Respiratory:  Normal respiratory effort.  Clear to auscultation, bilaterally without Rales/Wheezes/Rhonchi. Cardiovascular: Regular rate and rhythm with normal S1/S2 without murmurs, rubs or gallops. Abdomen: Soft, non-tender, non-distended with normal bowel sounds. Musculoskeletal: No clubbing, cyanosis or edema bilaterally. Full range of motion without deformity. Skin: Skin color, texture, turgor normal.  No rashes or lesions. Neurologic:  Left arm 1/5. Left leg 2/5. Cranial nerves: II-XII intact, grossly non-focal.  Psychiatric: Alert and oriented, thought content appropriate, normal insight  Capillary Refill: Brisk,< 3 seconds    Peripheral Pulses: +2 palpable, equal bilaterally       Labs:   Recent Labs     03/16/21  1018 03/17/21  0603   WBC 6.6 4.4   HGB 16.5* 14.1   HCT 47.3 40.7   * 116*     Recent Labs     03/16/21  1018      K 3.6   CL 99   CO2 30   BUN 17   CREATININE 1.1   CALCIUM 11.4*     Recent Labs     03/16/21  1018   AST 44*   ALT 40   BILITOT 1.1*   ALKPHOS 67     Recent Labs     03/16/21  1018   INR 0.88     Recent Labs     03/16/21  1018   TROPONINI <0.01       Urinalysis:      Lab Results   Component Value Date    NITRU Negative 03/16/2021    WBCUA 3-5 03/16/2021    BACTERIA 1+ 03/16/2021    RBCUA 3-4 03/16/2021    BLOODU TRACE-INTACT 03/16/2021    SPECGRAV 1.015 03/16/2021    GLUCOSEU >=1000 03/16/2021       Radiology:  MRI brain without contrast   Final Result   Acute lacunar infarcts within the right MCA vascular territory. The findings were sent to the Radiology Results [id number] at 4:23   pm on 3/16/2021to be communicated to a licensed caregiver. XR CHEST PORTABLE   Final Result   No acute cardiopulmonary pathology. CTA HEAD NECK W CONTRAST   Final Result   Partial occlusion of a M3 branch of the right MCA      50% stenosis in the distal A2 segment of the right CORINE. 70% stenosis in distal A2 segment of the left CORINE.       75% stenosis in the proximal V4 segment of the right vertebral artery. 50% stenosis in the distal P2 segment of the left PCA. 70% stenosis in the P3 segment of the right PCA. No acute abnormality or flow-limiting stenosis of the major arteries of the   neck. Beading of the bilateral cervical internal carotid arteries and vertebral   arteries, likely related to mild fibromuscular dysplasia. CT HEAD WO CONTRAST   Final Result   1. No definite evidence of acute intracranial abnormality. 2. Tiny focus of low attenuation near head of right caudate nucleus. Findings suggest presence of tiny remote lacunar infarct which is a new   finding when compared to 7/14/2020 as described above. Critical results were called by Susana Yee. Lisa Blair MD to Kayy Mccain on   3/16/2021 at 10:46. Assessment/Plan:    Active Hospital Problems    Diagnosis    Cerebrovascular accident (CVA) ([facility] [address] [I63.9]    DM (diabetes mellitus), secondary, uncontrolled, w/neurologic complic ([facility] [address] [L32.46, A00.74]    Acute left-sided weakness [R53.1]    HTN (hypertension), benign [I10]     Acute left-sided weakness - likely acute CVA. -  stroke team contacted from the ED, but unfortunately, patient was out of the TPA window. - CT head negative. - CTA head/neck - segments of bilateral MCA, CORINE, and PCA with stenosis. BICA without stenosis. - MRI brain - acute lacunar infarcts within the right MCA territory. - ECHO ordered. - monitor on tele.  - PT/OT/SLP evaluation. - continue home ASA. Add statin. - A1c 9, LDL 79.  - neurology consulted and following.     DM2, uncontrolled. - A1c 9.    - hold home metformin. Patient wishes for alternate agent at [place] as metformin causes her diarrhea. - medium dose SSI ordered. - added Lantus 3/18.     HTN - uncontrolled on arrival.  Could be contributing to presentation.  - hold home amlodipine.     - prn labetalol for SBP >220   - avoid BP < 140/90          DVT Prophylaxis: Lovenox  Diet: DIET GENERAL; Carb Control: 4 carb choices (60 gms)/meal  Code Status: Full Code    PT/OT Eval Status: recommend ARU    Dispo - hopefully to ARU tomorrow if ECHO completed    RAJESH Cross - CNP

## 2021-03-19 NOTE — PLAN OF CARE
ARU PATIENT TREATMENT PLAN  [facility]   [phone]    Bess Singh    : 1950  [facility] #: [de-identified]  MRN: [id number]   PHYSICIAN:  Oc Thayer MD  Primary Problem    Patient Active Problem List   Diagnosis    TIA (transient ischemic attack)    DM (diabetes mellitus), type 2, uncontrolled with complications ([facility]    Essential hypertension with goal blood pressure less than 140/90    Hypertriglyceridemia    HTN (hypertension), benign    Dyslipidemia    Acute left-sided weakness    Cerebrovascular accident (CVA) ([facility]    DM (diabetes mellitus), secondary, uncontrolled, w/neurologic complic ([facility]    Acute ischemic stroke ([facility]       Rehabilitation Diagnosis:     Acute ischemic stroke ([facility] [I63.9]       ADMIT DATE:3/19/2021    Patient Goals: to go home    Admitting Impairments: Pt admitting  A ARU with left sided weakness from a CVA  Resulting in  Decreased functional mobility ; Decreased endurance;Decreased coordination;Decreased ADL status; Decreased posture;Decreased ROM; Decreased balance;Decreased strength;Decreased safe awareness;Decreased high-level IADLs;Decreased cognition;Decreased vision/visual deficit; Decreased fine motor control  Barriers: Weakness, ADLs, Cognition  Participation: Good     CARE PLAN     NURSING:  Bess Singh while on this unit will:     [x] Be continent of bowel and bladder     [x] Have an adequate number of bowel movements  [x] Urinate with no urinary retention >300ml in bladder  [] Complete bladder protocol with guerrero removal  [x] Maintain O2 SATs at _92__%  [x] Have pain managed while on ARU       [x] Be pain free by discharge   [x] Have no skin breakdown while on ARU  [] Have improved skin integrity via wound measurements  [] Have no signs/symptoms of infection at the wound site  [x] Be free from injury during hospitalization   [x] Complete education with patient/family with understanding demonstrated for:  [x] Adjustment   [x] Other:   Nursing interventions may include bowel/bladder training, education for medical assistive devices, medication education, O2 saturation management, energy conservation, stress management techniques, fall prevention, alarms protocol, seating and positioning, skin/wound care, pressure relief instruction,dressing changes,  infection protection, DVT prophylaxis, and/or assistance with in room safety with transfers to bed, toilet, wheelchair, shower as well as bathroom activities and hygiene. Patient/caregiver education for:   [x] Disease/sustained injury/management      [x] Medication Use   [] Surgical intervention   [x] Safety   [x] Body mechanics and or joint protection   [x] Health maintenance         PHYSICAL THERAPY:  Goals:                  Short term goals  Time Frame for Short term goals: 1 week, 3/26/21  Short term goal 1: Pt will complete bed mobility with SBA and no rails. Short term goal 2: Pt will complete sit<>stand with Min A with LRAD. Short term goal 3: Pt will ambulate 100 feet with LRAD and Mod Ax1. Short term goal 4: Pt will complete bed<>chair transfer with LRAD and Min A. Short term goal 5: Pt will complete 150 feet of w/c mobility with multiple turns and IND. Long term goals  Time Frame for Long term goals : 2 weeks, by 4/2/21  Long term goal 1: Pt will complete bed mobility with independence in flat bed without rails. Long term goal 2: Pt will complete sit<>stands with independence and LRAD. Long term goal 3: Pt will complete bed<>Chair transfer with LRAD and independence. Long term goal 4: Pt will ambulate 100 feet with LRAD and independence. Long term goal 5: Pt will ascend/descend 4 steps with bilateral rails and CGA. These goals were reviewed with this patient at the time of assessment and Yasemin Treviño is in agreement.      Plan of Care: Pt to be seen 5 out of 7 days per week, 90 mins (exact) per day for 14 days (exact)                   Current Treatment Recommendations: Strengthening, Neuromuscular Re-education, Home Exercise Program, ROM, Balance Training, Endurance Training, Functional Mobility Training, Transfer Training, Gait Training, Stair training, Wheelchair Mobility Training, Safety Education & Training, Patient/Caregiver Education & Training, Equipment Evaluation, Education, & procurement      OCCUPATIONAL THERAPY:  Goals:             Short term goals  Time Frame for Short term goals: 1 week (3-26-21)  Short term goal 1: Pt will complete functional transfers mod A  Short term goal 2: Pt will complete UBD using bisi-techniques mod A  Short term goal 3: min assist with LUE self ROM/positioning  Short term goal 4: Pt will complete toileting mod A :  Long term goals  Time Frame for Long term goals : 2 weeks (4-2-21)  Long term goal 1: Pt will complete functional transfers SBA  Long term goal 2: Pt will complete UBD using bisi-techniques SBA  Long term goal 3: Pt will complete toileting CGA :    These goals were reviewed with this patient at the time of assessment and Arun Vallecillo is in agreement    Plan of Care:  Pt to be seen 5 out of 7 days per week, 90 mins (exact) per day for 14 days (exact)  Current Treatment Recommendations: Balance Training, Functional Mobility Training, Safety Education & Training, Positioning, Neuromuscular Re-education, Self-Care / ADL, Strengthening, Gait Training, Patient/Caregiver Education & Training, Stair training, ROM, Equipment Evaluation, Education, & procurement, Home Management Training, Endurance Training, Pain Management, Wheelchair Mobility Training, Cognitive/Perceptual Training, Cognitive Reorientation, Modalities (comment)      SPEECH THERAPY: Goals will be left blank if speech is not following this patient.   Goals:                        Short-term Goals  Timeframe for Short-term Goals: at least 5x/week for 10 days by 3/30/21  Goal 1: Patient will be able to demonstrate appropriate diaphragmatic breathing and completion of exercises for speech production with min cues. Goal 2: Patient will increase maximum phonation time to 12 seconds. Goal 3: Patient will demonstrate use of speech compensatory stategies with 90% accuracy  Goal 4: Patient will demonstrate adeqaute breath support with 95% accuracy for the  sentence level. Timeframe for Short-term Goals: at least 5x/week for 10 days by 3/30/21  These goals were reviewed with this patient at the time of assessment and Tarik Diggs is in agreement    Plan of Care: Pt to be seen 5 out of 7 days per week, 30   mins (exact) per day for 14 days (exact)             Dysphagia:  [facility]           Speech/Language/Cognition:  Compensatory strategy training and carryover, speech/breath control therapeutic intervention. CASE MANAGEMENT:  Goals:   Assist patient/family with discharge planning, patient/family counseling,   and coordination with insurance during ARU stay.     QIM / IRF KEVIN SCORES:  ITEM CURRENT SCORE GOAL   Eating CARE Score: 4 Discharge Goal: Independent   Oral Hygiene CARE Score: 4 Discharge Goal: Independent   Toileting Hygiene CARE Score: 2 Discharge Goal: Supervision or touching assistance   Shower/Bathe Self CARE Score: 2 Discharge Goal: Supervision or touching assistance   Upper Body Dressing CARE Score: 2 Discharge Goal: Supervision or touching assistance   Lower Body Dressing CARE Score: 2 Discharge Goal: Partial/moderate assistance   On/Off Footwear CARE Score: 1 Discharge Goal: Supervision or touching assistance   Roll Left & Right CARE Score: 3 Discharge Goal: Independent   Sit to Lying  CARE Score: 3 Discharge Goal: Independent   Lying to Sitting EOB CARE Score: 3 Discharge Goal: Independent   Sit to Stand CARE Score: 2 Discharge Goal: Independent   Chair/Bed to Chair Transfer   Discharge Goal: Independent   Toilet Transfer CARE Score: 2 Discharge Goal: Supervision or touching assistance   Car Transfer CARE Score: 2 Discharge Goal: Independent   Walk 10 Feet CARE Score: 1 Discharge Goal: Independent   Walk 50 Feet, 2 Turns CARE Score: 88 Discharge Goal: Independent   Walk 150 Feet CARE Score: 88 Discharge Goal: Independent   Walk 10 Feet, Uneven Surface CARE Score: 88 Discharge Goal: Independent   1 Step (Curb) CARE Score: 88 Discharge Goal: Independent   4 Steps CARE Score: 88 Discharge Goal: Supervision or touching assistance   12 Steps CARE Score: 88 Discharge Goal: Supervision or touching assistance    Object CARE Score: 88 Discharge Goal: Supervision or touching assistance   Wheel 50 feet, 2 turns CARE Score: 4 Discharge Goal: Independent   Wheel 150 Feet CARE Score: 3 Discharge Goal: Independent          Denisse Ellis will be seen a minimum of 3 hours of therapy per day, a minimum of 5 out of 7 days per week. [] In this rare instance due to the nature of this patient's medical involvement, this patient will be seen 15 hours per week (900 minutes within a 7 day period). Treatments may include therapeutic exercises, gait training, neuromuscular re-ed, transfer training, community reintegration, bed mobility, w/c mobility and training, self care, home mgmt, cognitive training, energy conservation,dysphagia tx, speech/language/communication therapy, group therapy, and patient/family education. In addition, dietician/nutritionist may monitor calorie count as well as intake and collaboratively work with SLP on dietary upgrades. Neuropsychology/Psychology may evaluate and provide necessary support.     Medical issues being managed closely and that require 24 hour availability of a physician:   [x] Swallowing Precautions  [x] Bowel/Bladder Fx  [] Weight bearing precautions   [] Wound Care    [x] Pain Mgmt   [x] Infection Protection   [x] DVT Prophylaxis   [x] Fall Precautions  [x] Fluid/Electrolyte/Nutrition Balance   [x] Voice Protection   [] Respiratory  [] Other: Medical Prognosis: [] Good  [x] Fair    [] Guarded   Total expected IRF days 14  Anticipated discharge destination:    [] Home Independently   [] Home Modified Independent  [x] Home with supervision    []SNF     [] Other                                           Physician anticipated functional outcomes:  Home w/ supervision and home health services. IPOC brief synthesis: 69year old female admitted to ARU to address strengthening, endurance, balance, gait, ADLs, assistive/adaptive device needs, patient/family training, speech, language, cognition. This plan has been reviewed with Dixie Nevarez on 3/20  in a language the patient understands. Dixie Nevarez has had the opportunity to include input with the therapy team.      I have reviewed this initial plan of care and agree with its contents:    Title   Name    Date    Time    Physician: Diana Ritchie.  Nafisa Peña MD 3/22/2021, 9:33 AM     Case Mgmt: Shay SORIANO    OT: Pipo Napoles, OTR/L     PT: Steven Mukherjee PT, DPT    RN: Yrn GUTIERREZN, RN    ST: Yaniv Cedeno MS-CCC-SLP    : Odilon Ferrera, OTR/L    Other:

## 2021-03-19 NOTE — H&P
Patient: Tarik Diggs  [id number]  Date: 3/19/2021      Chief Complaint: left-sided weakness     History of Present Illness/Hospital Course:  74-year-old female to history of HTN, diabetes, and GERD who was admitted on 3/16 with acute left-sided weakness. CT head was unremarkable. CTA with multiple intracranial stenoses and occlusion of the right MCA M3 branch. MRI with acute lacunar infarcts in the right MCA territory. LDL unable to be calculated due to severely elevated triglycerides. A1c 9.0. neurology evaluated and suggested improvement and comorbidities on top of standard aspirin and statin prophylaxis. She was evaluated by therapy and suggested to continue in an inpatient setting prior to returning home. She reports no current complaints.     Prior Level of Function:  Independent      Current Level of Function:  Total assist     has a past medical history of Diabetes mellitus ([address] GERD (gastroesophageal reflux disease), and Hypertension. has no past surgical history of note     reports that she has never smoked. She has never used smokeless tobacco. She reports previous alcohol use. She reports that she does not use drugs. family history is not contributory    REVIEW OF SYSTEMS:   CONSTITUTIONAL: negative for fevers, chills, diaphoresis, appetite change, fatigue, night sweats and unexpected weight change. HEENT: negative for hearing loss, tinnitus, ear drainage, sinus pressure, nasal congestion, epistaxis and snoring. RESPIRATORY: Negative for hemoptysis, cough, sputum production. CARDIOVASCULAR: negative for chest pain, palpitations, exertional chest pressure/discomfort, edema, syncope. GASTROINTESTINAL: negative for nausea, vomiting, diarrhea, constipation, blood in stool and abdominal pain. GENITOURINARY: negative for frequency, dysuria, urinary incontinence, decreased urine volume, and hematuria. HEMATOLOGIC/LYMPHATIC: negative for easy bruising, bleeding and lymphadenopathy. ALLERGIC/IMMUNOLOGIC: negative for recurrent infections, angioedema, anaphylaxis and drug reactions. ENDOCRINE: negative for weight changes and diabetic symptoms including polyuria, polydipsia and polyphagia. MUSCULOSKELETAL: negative for pain, joint swelling, decreased range of motion and muscle weakness. NEUROLOGICAL: negative for headaches, slurred speech. Positive unilateral weakness. PSYCHIATRIC/BEHAVIORAL: negative for hallucinations, behavioral problems, confusion and agitation. All pertinent positives are noted in the HPI. Physical Examination:  Vitals:   Patient Vitals for the past 24 hrs:   BP Temp Temp src Pulse Resp SpO2   03/19/21 1721 (!) 152/87 98.7 °F (37.1 °C) Oral 86 18 95 %     Psych: Stable mood, normal judgement, normal affect. Const: No distress  Eyes: Conjunctiva noninjected, no icterus noted; pupils equal, round. HENT: Atraumatic, normocephalic; Oral mucosa moist  Neck: Trachea midline, neck supple. No thyromegaly noted. CV: No audible murmurs  Resp: No increased WOB, no audible wheezing   GI: Nondistended   Neuro: Alert, oriented, appropriate. RUE/RLE 5/5 LLE 4/5 LUE 2/5 diffusely  Skin: No visible abnormalities  MSK: No joint abnormalities noted. Ext: No significant edema appreciated. No varicosities.     Lab Results   Component Value Date    WBC 5.7 03/19/2021    HGB 14.3 03/19/2021    HCT 41.0 03/19/2021    MCV 81.7 03/19/2021     (L) 03/19/2021     Lab Results   Component Value Date    INR 0.88 03/16/2021    PROTIME 10.2 03/16/2021     Lab Results   Component Value Date    CREATININE 1.3 (H) 03/19/2021    BUN 26 (H) 03/19/2021     03/19/2021    K 3.3 (L) 03/19/2021     03/19/2021    CO2 26 03/19/2021     Lab Results   Component Value Date    ALT 40 03/16/2021    AST 44 (H) 03/16/2021    ALKPHOS 67 03/16/2021    BILITOT 1.1 (H) 03/16/2021       Most recent echocardiogram revealed   Procedure     Type of Study      TTE procedure:ECHOCARDIOGRAM  The normal signal voids   within the major intracranial vessels appear maintained.       ORBITS: The visualized portion of the orbits demonstrate no acute abnormality.       SINUSES: The visualized paranasal sinuses and mastoid air cells are well   aerated.       BONES/SOFT TISSUES: The bone marrow signal intensity appears normal. The soft   tissues demonstrate no acute abnormality.           Impression   Acute lacunar infarcts within the right MCA vascular territory.      EXAMINATION:   CTA OF THE HEAD AND NECK WITH CONTRAST 3/16/2021 10:31 am:       TECHNIQUE:   CTA of the head and neck was performed with the administration of intravenous   contrast. Multiplanar reformatted images are provided for review.  MIP images   are provided for review. Stenosis of the internal carotid arteries measured   using NASCET criteria. Dose modulation, iterative reconstruction, and/or   weight based adjustment of the mA/kV was utilized to reduce the radiation   dose to as low as reasonably achievable.       COMPARISON:   Noncontrast CT head from earlier today       HISTORY:   ORDERING SYSTEM PROVIDED HISTORY: left sided weakness   TECHNOLOGIST PROVIDED HISTORY:   Reason for exam:->left sided weakness   Decision Support Exception->Emergency Medical Condition (MA)   Reason for Exam: left side weakness   Acuity: Acute   Type of Exam: Initial   Relevant Medical/Surgical History: hx tia       FINDINGS:       CTA NECK:       AORTIC ARCH/ARCH VESSELS: No dissection or arterial injury.  No significant   stenosis of the brachiocephalic or subclavian arteries.       CAROTID ARTERIES: No dissection, arterial injury, or hemodynamically   significant stenosis by NASCET criteria.  There is beading of the mid to   distal bilateral cervical internal carotid arteries, likely related to mild   fibromuscular dysplasia.       VERTEBRAL ARTERIES: No dissection, arterial injury, or significant stenosis.    There is mild beading in bilateral distal vertebral arteries, likely related   to mild fibromuscular dysplasia.       SOFT TISSUES: The lung apices are clear.  No cervical or superior mediastinal   lymphadenopathy. Kylee Guise is mild prominence of the palatine and lingual   tonsils, likely related to fibromuscular dysplasia.  No acute abnormality of   the salivary glands.  There are thyroid nodules measuring up to 1 cm, do not   require follow-up due to small size.       BONES: No acute osseous abnormality.  There are moderate degenerative changes   in the cervical spine.           CTA HEAD:       ANTERIOR CIRCULATION: There is partial occlusion of a M3 branch of the right   MCA (series 2, image 212).  There is 50% stenosis in the distal A2 segment of   the right CORINE.  There is 70% stenosis in distal A2 segment of the left CORINE. No significant stenosis of the intracranial internal carotid, or left middle   cerebral arteries. No aneurysm.       POSTERIOR CIRCULATION: There is 75% stenosis in the proximal V4 segment of   the right vertebral artery.  There is 50% stenosis in the distal P2 segment   of the left PCA.  There is 70% stenosis in the P3 segment of the right PCA. No significant stenosis of the left vertebral or basilar arteries.  No   aneurysm.       OTHER: No dural venous sinus thrombosis on this non-dedicated study.       BRAIN: There is lacunar infarct in the right head of caudate nucleus, likely   chronic.  No definite acute territorial infarction. Kylee Guise is minimal   parenchymal volume loss.  There is periventricular white matter low   attenuation, likely related to minimal chronic microvascular disease.  No   mass effect or midline shift. No extra-axial fluid collection.           Impression   Partial occlusion of a M3 branch of the right MCA       50% stenosis in the distal A2 segment of the right CORINE.     70% stenosis in distal A2 segment of the left CORINE.        75% stenosis in the proximal V4 segment of the right vertebral artery.       50% stenosis in the distal P2 segment of the left PCA.       70% stenosis in the P3 segment of the right PCA.       No acute abnormality or flow-limiting stenosis of the major arteries of the   neck.       Beading of the bilateral cervical internal carotid arteries and vertebral   arteries, likely related to mild fibromuscular dysplasia.      EXAMINATION:   CT OF THE HEAD WITHOUT CONTRAST  3/16/2021 10:27 am       TECHNIQUE:   CT of the head was performed without the administration of intravenous   contrast. Dose modulation, iterative reconstruction, and/or weight based   adjustment of the mA/kV was utilized to reduce the radiation dose to as low   as reasonably achievable.       COMPARISON:   7/14/2020 and MRI of the brain 7/15/2020       HISTORY:   ORDERING SYSTEM PROVIDED HISTORY: left side weakness   TECHNOLOGIST PROVIDED HISTORY:   Reason for exam:->left side weakness   Has a \"code stroke\" or \"stroke alert\" been called? ->Yes   Decision Support Exception->Emergency Medical Condition (MA)   Reason for Exam: left side weakness; Acuity: Acute   Type of Exam: Initial   Relevant Medical/Surgical History: hx tia       FINDINGS:   BRAIN/VENTRICLES: Mild motion/streak artifact is present on the examination. The ventricular system is unchanged in appearance.  No evidence of mass   effect or midline shift.  Prominence of sulci overlying convexities of   cerebral hemispheres and cerebellum consistent with atrophy.  Subtle foci of   low attenuation within periventricular/subcortical white matter again   identified.  Finding likely on the basis of changes of minimal ischemic   leukoencephalopathy.  Tiny focus of low attenuation approaching density of   CSF is identified near the head of the right caudate nucleus (image 27).    Finding is suggestive of small focal area of remote lacunar infarction which   is a new finding when compared to the study of 7/14/2020.  No other area of   abnormal attenuation of brain parenchyma is identified.  No abnormal   extra-axial fluid collection is identified.  Atherosclerotic calcification of   distal internal carotid and vertebral arteries.       ORBITS: The visualized portion of the orbits demonstrate no acute abnormality.       SINUSES: The visualized paranasal sinuses and mastoid air cells demonstrate   no acute abnormality.       SOFT TISSUES/SKULL:  No acute abnormality of the visualized skull or soft   tissues.           Impression   1. No definite evidence of acute intracranial abnormality. 2. Tiny focus of low attenuation near head of right caudate nucleus. Findings suggest presence of tiny remote lacunar infarct which is a new   finding when compared to 7/14/2020 as described above.            The above laboratory data have been reviewed.      The above imaging data have been reviewed.      The above medical testing have been reviewed. There is no height or weight on file to calculate BMI. Assessment and Plan:  Acute ischemic infarct: ASA, statin. PT/OT/SLP    Diabetes: Uncontrolled, Lantus 20, SSI    HTN: norvasc 5, propranolol 80    HLD: significantly elevated triglycerides, Lipitor 80    LEX: Push PO. Patient prefers no IVF    Bowel: Per protocol  Bladder: Per protocol  Sleep: Trazodone PRN  Pain: tylenol, tramadol  DVT PPx: lovenox  ELOS: 17-21    Jossy Pereira MD 3/19/2021, 5:39 PM     * This document was created using dictation software. While all precautions were taken to ensure accuracy, errors may have occurred. Please disregard any typographical errors.

## 2021-03-19 NOTE — PROGRESS NOTES
Occupational Therapy  Facility/Department: [facility] C5 - MED SURG/ORTHO  Daily Treatment Note  NAME: Flor Colindres  : 1950  MRN: [id number]    Date of Service: 3/19/2021    Discharge Recommendations:  IP Rehab       Assessment      Activity Tolerance  Activity Tolerance: Patient Tolerated treatment well  Safety Devices  Safety Devices in place: Yes  Type of devices: Call light within reach; Left in chair;Chair alarm in place;Nurse notified         Patient Diagnosis(es): The primary encounter diagnosis was Cerebrovascular accident (CVA), unspecified mechanism ([address] A diagnosis of Hyperglycemia was also pertinent to this visit. has a past medical history of Diabetes mellitus ([address] GERD (gastroesophageal reflux disease), and Hypertension. has no past surgical history on file. Restrictions  Restrictions/Precautions  Restrictions/Precautions: General Precautions, Fall Risk  Position Activity Restriction  Other position/activity restrictions: Purewick, telemetry  Subjective   General  Chart Reviewed: Yes  Patient assessed for rehabilitation services?: Yes  Response to previous treatment: Patient with no complaints from previous session  Family / Caregiver Present: No  Referring Practitioner: RAJESH Rangel CNP  Diagnosis: CVA with Left hemiparesis  Subjective  Subjective: \"I can't tell if I am wet or not\" with regard to urinary continence  General Comment  Comments: RN cleared pt for OT; pt resting in bed, agreeable to therapy  Vital Signs  Patient Currently in Pain: Denies   Orientation  Orientation  Overall Orientation Status: Within Functional Limits  Objective    ADL  Grooming: Setup(seated in chair to brush teeth)  Toileting: Dependent/Total(\"Purewick\")        Balance  Sitting Balance: Contact guard assistance(EOB; supervision supported sitting in chair)  Standing Balance:  Moderate assistance(squat-pivot to Right)  Bed mobility  Supine to Sit: Minimal assistance(log roll to Right)  Sit to Supine: Unable to assess(Left up in chair upon exiting, pt agreeable)  Transfers  Sit Pivot Transfers: Moderate assistance(to Right)  Sit to stand: Moderate assistance  Stand to sit: Minimal assistance                 Neuromuscular Education  NDT Treatment: Upper extremity; Sitting(min assist with LUE AAROM within available AROM, able to place & hold toothpaste in Left hand to remove & replace screw on lid)           Upper Extremity Function  NDT Treatment: Upper extremity; Sitting(min assist with LUE AAROM within available AROM, able to place & hold toothpaste in Left hand to remove & replace screw on lid)     Type of ROM/Therapeutic Exercise: AAROM;AROM  Comment: BUE seated in chair  Hand flex/ext: x  10  Reps AROM RUE & 5 reps AROM  LUE hand flexion   Wrist flex/ext:  X10 Reps AROM RUE & 5 reps mod AAROM LUE  Elbow flex/ext:  x   10 Reps AROM RUE & 5 reps min AAROM LUE  Forearm sup/pron:  x  10  Reps AROM RUE & 5 Reps min AAROM LUE    LUE AROM (degrees)  LUE General AROM: trace scapular retraction/protraction, 3/4 hand flexion       Plan   Plan  Times per week: 3-5 x/ week  Current Treatment Recommendations: Balance Training, Functional Mobility Training, Safety Education & Training, Positioning, Neuromuscular Re-education, Self-Care / ADL    [facility] Score        [facility] Inpatient Daily Activity Raw Score: 12 (03/19/21 1117)  AM-[facility] Inpatient ADL T-Scale Score : 30.6 (03/19/21 1117)  ADL Inpatient CMS 0-100% Score: 66.57 (03/19/21 1117)  ADL Inpatient CMS G-Code Modifier : CL (03/19/21 1117)    Goals  Short term goals  Time Frame for Short term goals: 1 week(3-23-21)  Short term goal 1: Min assist with functional transfers by 3-23-21: 3/19 mod assist with sit-pivot transfers to Right  Short term goal 2: min assist with UE light ADL's-- 3/19 STG met; New STG: set up with UE grooming by 3-26-21  Short term goal 3: min assist with LUE self ROM/positioning-- 3/19 continues to require mod assist to position LUE onto tabletop/lap for safe positioning & neuromuscular reeducation  Short term goal 4: mod assist with toileting hygiene by 3-21-21--  Patient Goals   Patient goals : go home when able       Therapy Time   Individual Concurrent Group Co-treatment   Time In [place] [id number]         Time Out 1000         Minutes 111 [address] [place] [address]

## 2021-03-19 NOTE — ADT AUTH CERT
sensation is intact   VII: Facial strength and movements: Left facial asymmetry IX: Palate elevation is symmetric   XI: Shoulder shrug is intact   XII: Tongue movements are normal   Musculoskeletal: The same left-sided weakness.  No weakness of the right   Tone: Normal tone. Reflexes: Symmetric 2+ in both arms and legs. Coordination: Left pronator drift, no dysmetria with FNF   Sensation: normal to all modalities in both arms and legs. Gait/Posture: Unsteady      Impression: No change   Acute right ischemic MCA stroke with residual dysarthria and left-sided weakness.  Possible thromboembolic versus cardioembolic   Hypertension, not controlled   Hyperlipidemia   Multiple intracranial stenosis   Diabetes, not controlled.  A1c of 9.0           Recommendation   Echo   PT OT   Speech and swallow evaluation   Inpatient rehab consultation   Aspirin   Statin   DVT and GI prophylaxis   Insulin sliding scale   Blood sugar control and monitor closely   Continue blood pressure monitor and can restart blood pressure medication   Lengthy discussion with the patient and family regarding stroke prevention, risk of recurrence, lifestyle adjustment and risk of poorly controlled diabetes and hypertension   DC planning to rehab when medically stable       MDM: High complexity due to recent stroke, high risk for recurrence and multiple comorbid medical illness with significant morbidity      Attending:   No real changes.  Believes her left leg is somewhat stronger. Assessment/Plan:       Active Hospital Problems     Diagnosis   · Cerebrovascular accident (CVA) ([facility] [I63.9]   · DM (diabetes mellitus), secondary, uncontrolled, w/neurologic complic ([address] [F08.77, L95.99]   · Acute left-sided weakness [R53.1]   · HTN (hypertension), benign [I10]       Acute left-sided weakness - likely acute CVA. -  stroke team contacted from the ED, but unfortunately, patient was out of the TPA window. - CT head negative.    - CTA head/neck - segments of bilateral MCA, CORINE, and PCA with stenosis.  BICA without stenosis. - MRI brain - acute lacunar infarcts within the right MCA territory. - ECHO ordered. - monitor on tele.   - PT/OT/SLP evaluation. - continue home ASA.  Add statin. - A1c 9, LDL 79.   - neurology consulted and following.       DM2, uncontrolled. - A1c 9.     - hold home metformin.  Patient wishes for alternate agent at dc as metformin causes her diarrhea. - medium dose SSI ordered. - added Lantus 3/18.       HTN - uncontrolled on arrival.  Could be contributing to presentation.   - hold home amlodipine.     - prn labetalol for SBP >220    - avoid BP < 140/90               DVT Prophylaxis: Lovenox   Diet: DIET GENERAL; Carb Control: 4 carb choices (60 gms)/meal   Code Status: Full Code       PT/OT Eval Status: recommend ARU       Dispo - hopefully to ARU tomorrow if ECHO completed         PA recommendation INPATIENT by Claude Bullion, RN       Review Status Review Entered   In Primary 3/18/2021 13:56      Criteria Review   We recommend that the following pt's current hospitalization under Inpatient status is APPROPRIATE . Name: Doris Michel   : 1950   CSN: [id number]   INSURANCE: [employer]    69 F  left sided weakness.   MRI: Acute lacunar infarcts within the right MCA vascular territory          MCG criteria applies Y      This chart was reviewed at 9:50 AM 3/18/2021    Faheem Salvador MD   Physician Advisor

## 2021-03-19 NOTE — PROGRESS NOTES
Quynh Basia  3/19/2021  [id number]    Rehab Brief:    Patient is able to tolerate 3 hours of therapy 5 days per week and is medically appropriate for rehab at the Acute Rehabilitation Unit. Approved for admission today. Orders placed for transfer.       Flor Shah MD 3/19/2021 3:17 PM

## 2021-03-19 NOTE — PROGRESS NOTES
Physical Therapy  Pt getting in the shower with PCA assist on first attempt. Getting echo on second attempt. Will follow up as schedule permits. DC orders noted to ARU.      Germaine Crawford, PT, DPT

## 2021-03-19 NOTE — CARE COORDINATION
CASE MANAGEMENT DISCHARGE SUMMARY      Discharge to: [place] [id number] completed:   Yes  Hospital Exemption Notification (HENS) completed:   NA    New Durable Medical Equipment ordered/agency:  none      Confirmed discharge plan with:     Patient: yes       Facility/Agency, name:  [facility] faxed   Phone number for report to facility:      RN, name:  Roxanne Spear    Note: Discharging nurse to complete [facility], reconcile [facility], and place final copy with patient's discharge packet. RN to ensure that written prescriptions for  Level II medications are sent with patient to the facility as per protocol.

## 2021-03-19 NOTE — CARE COORDINATION
JUWAN called Mandie Rojas in [address]. She is aware echo is complete and read. She will reach out to Dr. Sheila Pro and confirm admission to [address] [place] [address] today.

## 2021-03-19 NOTE — PROGRESS NOTES
Quynh Flor  Neurology Follow-up  [facility] Neurology    Date of Service: 3/19/2021    Subjective:   CC: Follow up today regarding: Acute left-sided weakness and new stroke. Events noted. Chart and lab reviewed. No new complaints with the patient today except for mild pain in her left leg. No dysphagia or worsening dysarthria. The same left-sided weakness. Echo showed normal EF. ROS : A 10-12 system review obtained and updated today and is unremarkable except as mentioned  in my interval history.      Family history: Noncontributory    Past Medical History:   Diagnosis Date    Diabetes mellitus ([address]     GERD (gastroesophageal reflux disease)     Hypertension      Current Facility-Administered Medications   Medication Dose Route Frequency Provider Last Rate Last Admin    acetaminophen (TYLENOL) tablet 650 mg  650 mg Oral Q4H PRN Sakina Hare, DO   650 mg at 03/19/21 0624    insulin glargine (LANTUS) injection vial 15 Units  0.2 Units/kg Subcutaneous Nightly Angel Distance, APRN - CNP   15 Units at 03/18/21 2039    melatonin disintegrating tablet 5 mg  5 mg Oral Nightly Madhuri Roll, APRN - CNP   5 mg at 03/18/21 2040    allopurinol (ZYLOPRIM) tablet 300 mg  300 mg Oral Daily Angel Distance, APRN - CNP   300 mg at 03/19/21 6420    aspirin chewable tablet 81 mg  81 mg Oral Daily Angel Distance, APRN - CNP   81 mg at 03/19/21 0839    cetirizine (ZYRTEC) tablet 10 mg  10 mg Oral Daily Angel Distance, APRN - CNP   10 mg at 03/19/21 0839    sodium chloride flush 0.9 % injection 10 mL  10 mL Intravenous 2 times per day Angel Distance, APRN - CNP   10 mL at 03/19/21 0834    sodium chloride flush 0.9 % injection 10 mL  10 mL Intravenous PRN Angel Distance, APRN - CNP        promethazine (PHENERGAN) tablet 12.5 mg  12.5 mg Oral Q6H PRN Angel Distance, APRN - CNP        Or    ondansetron (ZOFRAN) injection 4 mg  4 mg Intravenous Q6H PRN Angel Distance, APRN - CNP        polyethylene [phone]      This dictation was generated by voice recognition computer software. Although all attempts are made to edit the dictation for accuracy, there may be errors in the transcription that are not intended.

## 2021-03-19 NOTE — DISCHARGE INSTR - COC
Continuity of Care Form    Patient Name: Gisela Viramontes   :  1950  MRN:  [id number]    Admit date:  3/16/2021  Discharge date:  ***    Code Status Order: Full Code   Advance Directives:   Advance Care Flowsheet Documentation       Date/Time Healthcare Directive Type of Healthcare Directive Copy in [address] Agent's Name Healthcare Agent's Phone Number    21 1311  No, patient does not have an advance directive for healthcare treatment -- -- -- -- --            Admitting Physician:  Jonny Michaels MD  PCP: Shemar Agarwal MD    Discharging Nurse: [facility] Unit/Room#: 9437/9593-48  Discharging Unit Phone Number: ***    Emergency Contact:   Extended Emergency Contact Information  Primary Emergency Contact: [facility]  Address: [address] [place] [address], [address] [place] [address] Phone: [phone]  Mobile Phone: [phone]  Relation: Spouse    Past Surgical History:  History reviewed. No pertinent surgical history. Immunization History: There is no immunization history on file for this patient.     Active Problems:  Patient Active Problem List   Diagnosis Code    TIA (transient ischemic attack) G45.9    DM (diabetes mellitus), type 2, uncontrolled with complications ([facility] S51.3, E11.65    Essential hypertension with goal blood pressure less than 140/90 I10    Hypertriglyceridemia E78.1    HTN (hypertension), benign I10    Dyslipidemia E78.5    Acute left-sided weakness R53.1    Cerebrovascular accident (CVA) ([facility] [address] I63.9    DM (diabetes mellitus), secondary, uncontrolled, w/neurologic complic (HCC) C17.16, Q83.03       Isolation/Infection:   Isolation            No Isolation          Patient Infection Status       None to display            Nurse Assessment:  Last Vital Signs: BP (!) 171/87   Pulse 117   Temp 98.5 °F (36.9 °C) (Oral)   Resp 18   Ht 5' (1.524 m)   Wt 162 lb (73.5 kg)   SpO2 96%   BMI 31.64 kg/m²     Last documented pain score (0-10 scale): Pain Level: 0  Last Weight:   Wt Readings from Last 1 Encounters:   03/16/21 162 lb (73.5 kg)     Mental Status:  oriented and alert    IV Access:  - None    Nursing Mobility/ADLs:  Walking   Dependent  Transfer  Dependent  Bathing  Assisted  Dressing  Assisted  Toileting  Assisted  Feeding  [facility] Delivery   whole    Wound Care Documentation and Therapy:        Elimination:  Continence:   · Bowel: Yes   · Bladder: Yes and Ni  Urinary Catheter: None   Colostomy/Ileostomy/Ileal Conduit: No       Date of Last BM: 3/18    Intake/Output Summary (Last 24 hours) at 3/19/2021 1231  Last data filed at 3/19/2021 0976  Gross per 24 hour   Intake    Output 400 ml   Net -400 ml     I/O last 3 completed shifts:  In: -   Out: 400 [Urine:400]    Safety Concerns: At Risk for Falls    Impairments/Disabilities:      None    Nutrition Therapy:  Current Nutrition Therapy:   - Oral Diet:  General    Routes of Feeding: Oral  Liquids: No Restrictions  Daily Fluid Restriction: no  Last Modified Barium Swallow with Video (Video Swallowing Test): not done    Treatments at the Time of Hospital Discharge:   Respiratory Treatments: ***  Oxygen Therapy:  is not on home oxygen therapy.   Ventilator:    - No ventilator support    Rehab Therapies: Physical Therapy, Occupational Therapy and Speech/Language Therapy  Weight Bearing Status/Restrictions: No weight bearing restirctions  Other Medical Equipment (for information only, NOT a DME order):  wheelchair  Other Treatments: ***    Patient's personal belongings (please select all that are sent with patient):  ***    RN SIGNATURE:  Electronically signed by Crystal Bonner RN on 3/19/21 at 1:53 PM EDT    CASE MANAGEMENT/SOCIAL WORK SECTION    Inpatient Status Date: ***    Readmission Risk Assessment Score:  Readmission Risk              Risk of Unplanned Readmission:        12           Discharging to Facility/ Agency   · Name:   · Address:  · Phone:  · Fax:    Dialysis Facility (if applicable)   · Name:  · Address:  · Dialysis Schedule:  · Phone:  · Fax:    / signature: {Esiature:[id number]:::0}    PHYSICIAN SECTION    Prognosis: Good    Condition at Discharge: Stable    Rehab Potential (if transferring to Rehab): Good    Recommended Labs or Other Treatments After Discharge:   PT/OT/SLP  Monitor BP  Follow-up with PCP and Neurology as needed      Physician Certification: I certify the above information and transfer of Shoaib Cunningham  is necessary for the continuing treatment of the diagnosis listed and that she requires Acute Rehab for less 30 days.      Update Admission H&P: No change in H&P    PHYSICIAN SIGNATURE:  Electronically signed by Issa Stuart MD on 3/19/21 at 12:31 PM EDT

## 2021-03-20 LAB
GLUCOSE BLD-MCNC: 177 MG/DL (ref 70–99)
GLUCOSE BLD-MCNC: 177 MG/DL (ref 70–99)
GLUCOSE BLD-MCNC: 180 MG/DL (ref 70–99)
GLUCOSE BLD-MCNC: 189 MG/DL (ref 70–99)
PERFORMED ON: ABNORMAL

## 2021-03-20 PROCEDURE — 97116 GAIT TRAINING THERAPY: CPT

## 2021-03-20 PROCEDURE — 97530 THERAPEUTIC ACTIVITIES: CPT

## 2021-03-20 PROCEDURE — 92610 EVALUATE SWALLOWING FUNCTION: CPT

## 2021-03-20 PROCEDURE — 97166 OT EVAL MOD COMPLEX 45 MIN: CPT

## 2021-03-20 PROCEDURE — 6370000000 HC RX 637 (ALT 250 FOR IP): Performed by: PHYSICAL MEDICINE & REHABILITATION

## 2021-03-20 PROCEDURE — 97535 SELF CARE MNGMENT TRAINING: CPT

## 2021-03-20 PROCEDURE — 97162 PT EVAL MOD COMPLEX 30 MIN: CPT

## 2021-03-20 PROCEDURE — 92522 EVALUATE SPEECH PRODUCTION: CPT

## 2021-03-20 PROCEDURE — 1280000000 HC REHAB R&B

## 2021-03-20 PROCEDURE — 6360000002 HC RX W HCPCS: Performed by: PHYSICAL MEDICINE & REHABILITATION

## 2021-03-20 RX ADMIN — AMLODIPINE BESYLATE 5 MG: 5 TABLET ORAL at 09:49

## 2021-03-20 RX ADMIN — ENOXAPARIN SODIUM 40 MG: 40 INJECTION SUBCUTANEOUS at 09:49

## 2021-03-20 RX ADMIN — ALLOPURINOL 300 MG: 300 TABLET ORAL at 09:49

## 2021-03-20 RX ADMIN — INSULIN GLARGINE 20 UNITS: 100 INJECTION, SOLUTION SUBCUTANEOUS at 20:29

## 2021-03-20 RX ADMIN — INSULIN LISPRO 2 UNITS: 100 INJECTION, SOLUTION INTRAVENOUS; SUBCUTANEOUS at 16:28

## 2021-03-20 RX ADMIN — INSULIN LISPRO 1 UNITS: 100 INJECTION, SOLUTION INTRAVENOUS; SUBCUTANEOUS at 20:29

## 2021-03-20 RX ADMIN — CETIRIZINE HYDROCHLORIDE 10 MG: 10 TABLET, FILM COATED ORAL at 09:49

## 2021-03-20 RX ADMIN — PROPRANOLOL HYDROCHLORIDE 80 MG: 40 TABLET ORAL at 09:49

## 2021-03-20 RX ADMIN — INSULIN LISPRO 2 UNITS: 100 INJECTION, SOLUTION INTRAVENOUS; SUBCUTANEOUS at 12:36

## 2021-03-20 RX ADMIN — Medication 5 MG: at 20:29

## 2021-03-20 RX ADMIN — ATORVASTATIN CALCIUM 80 MG: 80 TABLET, FILM COATED ORAL at 20:29

## 2021-03-20 RX ADMIN — INSULIN LISPRO 2 UNITS: 100 INJECTION, SOLUTION INTRAVENOUS; SUBCUTANEOUS at 06:46

## 2021-03-20 RX ADMIN — ASPIRIN 81 MG: 81 TABLET, CHEWABLE ORAL at 09:49

## 2021-03-20 ASSESSMENT — PAIN SCALES - GENERAL
PAINLEVEL_OUTOF10: 0
PAINLEVEL_OUTOF10: 0

## 2021-03-20 NOTE — PROGRESS NOTES
Speech Language Pathology  Facility/Department: [facility]  Initial Speech/Language/Cognitive Assessment    NAME: Cynthia Esteban  : 1950   MRN: [id number]  ADMISSION DATE: 3/19/2021  ADMITTING DIAGNOSIS: has TIA (transient ischemic attack); DM (diabetes mellitus), type 2, uncontrolled with complications ([facility] [address] Essential hypertension with goal blood pressure less than 140/90; Hypertriglyceridemia; HTN (hypertension), benign; Dyslipidemia; Acute left-sided weakness; Cerebrovascular accident (CVA) ([facility] [address] DM (diabetes mellitus), secondary, uncontrolled, w/neurologic complic ([facility] [address] and Acute ischemic stroke [facility]) on their problem list.  DATE ONSET: 3/16/21    Date of Eval: 3/20/2021   Evaluating Therapist: HETAL Parrish    RECENT RESULTS  CT OF HEAD/MRI: 3/16/21  Impression   Acute lacunar infarcts within the right MCA vascular territory.       The findings were sent to the Radiology Results [address] at 4:23   pm on 3/16/2021to be communicated to a licensed caregiver. Primary Complaint: Per H&P on 119:70-year-old female to history of HTN, diabetes, and GERD who was admitted on 3/16 with acute left-sided weakness. CT head was unremarkable. CTA with multiple intracranial stenoses and occlusion of the right MCA M3 branch. MRI with acute lacunar infarcts in the right MCA territory. Pain:  Pain Assessment  Pain Assessment: Faces  Pain Level: 0  Pain Type: Chronic pain  Pain Location: Back  Pain Orientation: Lower, Mid  Pain Descriptors: Aching  Non-Pharmaceutical Pain Intervention(s): Repositioned, Emotional support  Response to Pain Intervention: Patient Satisfied    Assessment:  Cognitive Diagnosis: WFL  Aphasia Diagnosis: WFL  Speech Diagnosis: mild spastic dysarthria       Patient is independent at baseline for homemaking takes, high level home management tasks, and driving. She reports that her main concern at this time with speech therapy is her slowed speech and breath support. Reports changes in pitch and volume of her voice as well. Patient presents with mild spastic dysarthria. Vocal quality is intermittently strained, decreased loudness, mild reduced vocal intensity. Change in pitch (perceptually per patient), reduced breath support and control for speech, slowed rate of speech. Maximum Phonation Time: Average 6 seconds,S/Z Ratio: 9/3=6. Demonstrates breathy vocal quality and decreased intensity with phrases and sentences. Patient would benefit from ongoing SLP intervention for the treatment of dysarthria. Reduced speech intelligibility at the sentence level d/t breath support and vocal intensity. Patient demonstrates language and cognitive-linguistic abilities within normal limits. Silver Cognitive Assessment completed. Patient scored 27 out of 30 indicated within normal limits. She demonstrated good functional recall and understanding for complex information with >20 minute delay. Recommendations:  Requires SLP Intervention: Yes  Duration/Frequency of Treatment: 2x/weeks (4/3/21)     Referral To: Dysphagia evaluation    Plan:   Goals:  Short-term Goals  Timeframe for Short-term Goals: at least 5x/week for 10 days by 3/30/21  Goal 1: Patient will be able to demonstrate appropriate diaphragmatic breathing and completion of exercises for speech production with min cues. Goal 2: Patient will increase maximum phonation time to 12 seconds. Goal 3: Patient will demonstrate use of speech compensatory stategies with 90% accuracy  Goal 4: Patient will demonstrate adeqaute breath support with 95% accuracy for the  sentence level. Long-term Goals  Timeframe for Long-term Goals: 5x/week for 14 days by 4/3/21  Goal 1: Patient will increase breath support for speech to increased speech to [facility].    Patient/family involved in developing goals and treatment plan: yes    Subjective:   Previous level of function and limitations: none  General  Chart Reviewed: Yes  Patient assessed for rehabilitation services?: Yes  Family / Caregiver Present: Yes  General Comment  Comments: Per H&P on 119:70-year-old female to history of HTN, diabetes, and GERD who was admitted on 3/16 with acute left-sided weakness. CT head was unremarkable. CTA with multiple intracranial stenoses and occlusion of the right MCA M3 branch. MRI with acute lacunar infarcts in the right MCA territory  Subjective  Subjective: Pt seen at bedside and was pleasant and cooperative. Social/Functional History  Lives With: Spouse  Type of Home: House  Home Layout: One level  Home Access: Ramped entrance  Bathroom Shower/Tub: Shower chair with back; Tub only; Walk-in shower  Bathroom Toilet: Standard  Bathroom Equipment: Hand-held shower; Shower chair  ADL Assistance: Independent  Homemaking Assistance: Independent  Homemaking Responsibilities: Yes  Meal Prep Responsibility: Primary  Laundry Responsibility: Primary  Cleaning Responsibility: Primary  Shopping Responsibility: Primary  Ambulation Assistance: Independent  Transfer Assistance: Independent  Active : Yes  Mode of Transportation: Car  Occupation: Retired  Type of occupation: marketing research,   Leisure & Hobbies: read on my [employer], card games on [address]: manages own meds, shared responsiblity with [facility] for finances  Additional Comments: Denies falls in past 6 months  Vision  Vision: Impaired  Vision Exceptions: Wears glasses at all times  Hearing  Hearing: Within functional limits           Objective:     Oral/Motor  Oral Motor: Exceptions to [facility]  Labial Symmetry: Abnormal symmetry left  Labial Strength: Reduced  Lingual Strength: Reduced    Auditory Comprehension  Comprehension: Within Functional Limits         Expression  Primary Mode of Expression: Verbal    Verbal Expression  Verbal Expression: Within functional limits    Written Expression  Dominant Hand: Right  Written Expression: Within Functional Limits    Motor Speech  Motor Speech: Exceptions to [facility]  Dysarthria : Mild    Pragmatics/Social Functioning  Pragmatics: Within functional limits    Cognition:      Orientation  Overall Orientation Status: Within Normal Limits  Attention  Attention: Within Functional Limits  Memory  Memory: Within Funtional Limits  Problem Solving  Problem Solving: Within Functional Limits  Numeric Reasoning  Numeric Reasoning: Within Functional Limits  Abstract Reasoning  Abstract Reasoning: Within Functional Limits  Safety/Judgement  Safety/Judgement: Within Functional Limits    Additional Assessments:  Voice Evaluation  Vocal Quality: Exceptions to [facility]  Breath Support: (reduced)  Breathy: Mild  High Pitched for Age and Sex: Mild(patient reported)  Hypernasal Resonance: Mild  Spastic: Mild  Dysphonic: Mild  Vocal Intensity: Mildly decreased  Maximum Phonation Time: Average 6 seconds  S/Z Ratio: 9/3=6         [place] Cognitive Assessment (MoCA)    Section Subtest Score Comments   Visuospatial/ [place] making 1     Copy Cube 0     Clock 3    Naming Picture naming 3    Attention Number repetition 2     Selective attention 1     Serial 7s 3    Language Repetition 2     Fluency 1    Abstraction Comparisons 2    Delayed Recall Recall 5 novel words 3 5 out of 5 with category cue. Orientation Spatial and Temporal 6     Total:             Prognosis:  Speech Therapy Prognosis  Prognosis: Excellent  Individuals consulted  Consulted and agree with results and recommendations: Patient    Education:  Patient Education: Pt educated on role of ST, reason for referral, assessment and results. Patient Education Response: Verbalizes understanding  Safety Devices in place: Yes  Type of devices: Left in bed;Nurse notified;Call light within reach; All fall risk precautions in place    Therapy Time:   Individual Concurrent Group Co-treatment   Time In 1330         Time Out 1415         Minutes [facility]  Speech Language Pathologist

## 2021-03-20 NOTE — PLAN OF CARE
Problem: Neurological  Intervention: Speech Evaluation/treatment  Note: SLP completed evaluation. Please refer to notes in EMR.      Eve Lin MS CCC-SLP  [place] .[id number]  Speech Language Pathologist  3/20/2021

## 2021-03-20 NOTE — PLAN OF CARE
Problem: Skin Integrity:  Goal: Will show no infection signs and symptoms  Description: Will show no infection signs and symptoms  Outcome: Ongoing     Problem: Skin Integrity:  Goal: Will show no infection signs and symptoms  Description: Will show no infection signs and symptoms  Outcome: Ongoing

## 2021-03-20 NOTE — PROGRESS NOTES
surgical history on file.     Restrictions  Restrictions/Precautions  Restrictions/Precautions: General Precautions, Fall Risk  Vision/Hearing  Vision: Impaired  Vision Exceptions: Wears glasses at all times  Hearing: Within functional limits     Subjective  General  Chart Reviewed: Yes  Patient assessed for rehabilitation services?: Yes  Response To Previous Treatment: Not applicable  Referring Practitioner: Steve Jennings MD  Referral Date : 03/19/21  Diagnosis: acute left sided weakness  Follows Commands: Within Functional Limits  General Comment  Comments: Pt resting in bed upon entry, RN cleared pt for therapy  Subjective  Subjective: PT agreeable to PT  Pain Screening  Patient Currently in Pain: Denies  Vital Signs  Patient Currently in Pain: Denies       Orientation  Orientation  Overall Orientation Status: Within Normal Limits  Social/Functional History  Social/Functional History  Lives With: Spouse(\"Morales\")  Type of Home: [address]: One level  Home Access: Ramped entrance  Bathroom Shower/Tub: [address] chair with back, Tub only, Walk-in shower  Bathroom Toilet: Standard  Bathroom Equipment: Hand-held shower, Shower chair  Bathroom Accessibility: Wheelchair accessible  Home Equipment: (transport wc, adjustable bed (reports too high))  ADL Assistance: Independent  Homemaking Assistance: Independent  Homemaking Responsibilities: Yes  Meal Prep Responsibility: Primary  Laundry Responsibility: Primary  Cleaning Responsibility: Primary  Shopping Responsibility: Primary  Ambulation Assistance: Independent  Transfer Assistance: Independent  Active : Yes  Occupation: Retired  Type of occupation: marketing research,   Leisure & Hobbies: read on my [employer], card games on [employer]  Additional Comments: Denies falls in past 6 months  Cognition   Cognition  Overall Cognitive Status: WFL    Objective          AROM RLE (degrees)  RLE AROM: WNL  PROM LLE (degrees)  LLE General PROM: grossly decreased AROM throughout by at least 50% due to decreased strength  Strength RLE  Strength RLE: WNL  Comment: grossly 4+ to 5/5 throughout  Strength LLE  Strength LLE: Exception  L Hip Flexion: 2/5  L Hip ABduction: 2+/5  L Hip ADduction: 2+/5  L Knee Flexion: 2+/5  L Knee Extension: 2-/5  L Ankle Dorsiflexion: 2-/5  L Ankle Plantar Flexion: 2/5  Tone RLE  RLE Tone: Normotonic  Tone LLE  LLE Tone: Hypotonic  Sensation  Overall Sensation Status: WFL  Bed mobility  Rolling to Left: Moderate assistance  Rolling to Right: Moderate assistance  Supine to Sit: Minimal assistance  Sit to Supine: Moderate assistance  Scooting: Moderate assistance  Comment: Mod A for rolling without rails, pt able to complete with less assist with rails; Min A for supine>sit with trunk support, Mod A for sit>supine for BLE management and trunk control  Transfers  Sit to Stand: Maximum Assistance  Stand to sit: Moderate Assistance  Bed to Chair: Maximum assistance  Stand Pivot Transfers: Moderate Assistance  Car Transfer: Maximum Assistance(For BLE management into the car, all positioning, sequencing, hand placement)  Comment: Constant cues for hand placement, increased assist with RW, able to complete SPT with Mod A, left knee buckling with transfers and standing. Ambulation  Ambulation?: Yes  More Ambulation?: Yes  Ambulation 1  Surface: level tile  Device: Rolling Walker  Assistance: Maximum assistance  Quality of Gait: Pt demos partial step through gait, right LE buckling, difficulty managing RW, slow to complete, forward flexed posture, leaning to the left, constant cues for safety and positioning. Gait Deviations: Slow Lisa;Decreased step length;Decreased step height;Shuffles;Staggers;Decreased head and trunk rotation  Distance: 8 feet  Comments: Very unsafe with an Ax1, would recommend co-tx to progress gait.   Ambulation 2  Surface - 2: level tile  Device 2: Parallel Bars  Assistance 2: Maximum assistance  Quality of Gait 2: Tactile cues for left LE and UE, able to take two steps, fatigues quickly. Pt's right LE hyperextended to increase support, right LE braced by therapists knees. Distance: 3 feet  Stairs/Curb  Stairs?: No(Unsafe to attempt with Ax1, weakness, and decreased coordination.)  Wheelchair Activities  Wheelchair Parts Management: Yes  Left Leg Rest Level of Assistance: Dependent/Total  Right Brakes Level of Assistance: Maximum assistance  Anti-tippers Level of Assistance: Maximum assistance  Propulsion: Yes  Propulsion 1  Propulsion: Manual  Level: Level Tile  Method: RUE;RLE  Level of Assistance: Minimal assistance  Description/ Details: Cues for object negotiation, turning, and sequencing with RUE/RLE, slow to complete, fatigues quickly. Pt improving throughout session. Distance: 150 feet     Balance  Posture: Fair  Sitting - Static: Fair;+  Sitting - Dynamic: Fair  Standing - Static: Fair;-  Standing - Dynamic: Poor;+  Comments: Sitting balance up to SBA, Standing balance Mod A in // bars for 3 minutes with mirror to work on posture and midline standing, pt fatigues quickly. Plan   Plan  Times per week: 5 out of 7  Times per day: Daily  Plan weeks: 2 weeks, by 4/2/21  Current Treatment Recommendations: Strengthening, Neuromuscular Re-education, Home Exercise Program, ROM, Balance Training, Endurance Training, Functional Mobility Training, Transfer Training, Gait Training, Stair training, Wheelchair Mobility Training, Safety Education & Training, Patient/Caregiver Education & Training, Equipment Evaluation, Education, & procurement  Safety Devices  Type of devices: All fall risk precautions in place, Nurse notified, Gait belt, Patient at risk for falls, Chair alarm in place, Left in chair, Call light within reach    Goals  Short term goals  Time Frame for Short term goals: 1 week, 3/26/21  Short term goal 1: Pt will complete bed mobility with SBA and no rails.   Short term goal 2: Pt will complete sit<>stand with Min A with LRAD. Short term goal 3: Pt will ambulate 100 feet with LRAD and Mod Ax1. Short term goal 4: Pt will complete bed<>chair transfer with LRAD and Min A. Short term goal 5: Pt will complete 150 feet of w/c mobility with multiple turns and IND. Long term goals  Time Frame for Long term goals : 2 weeks, by 4/2/21  Long term goal 1: Pt will complete bed mobility with independence in flat bed without rails. Long term goal 2: Pt will complete sit<>stands with independence and LRAD. Long term goal 3: Pt will complete bed<>Chair transfer with LRAD and independence. Long term goal 4: Pt will ambulate 100 feet with LRAD and independence. Long term goal 5: Pt will complete the car transfer with independence. Patient Goals   Patient goals : To get stronger and go home.      Therapy Time   Individual Concurrent Group Co-treatment   Time In 0730         Time Out 0900         Minutes 90         Timed Code Treatment Minutes: 75 Minutes(15 minute eval)     Geraldo Valencia, PT

## 2021-03-20 NOTE — PROGRESS NOTES
Continue Current Diet  Nutrition Education/Counseling:  Education completed   Coordination of Nutrition Care:  Continue to monitor while inpatient    Goals:  Patient will eat 50% or greater of meals.        Nutrition Monitoring and Evaluation:   Behavioral-Environmental Outcomes:  Knowledge or Skill   Food/Nutrient Intake Outcomes:  Supplement Intake, Food and Nutrient Intake  Physical Signs/Symptoms Outcomes:  Biochemical Data, Nutrition Focused Physical Findings     Discharge Planning:    Continue current diet     Electronically signed by Idalmis Hernandez RD, LD on 3/20/21 at 12:23 PM EDT    Contact:  Office: [phone]; [place]: [phone]

## 2021-03-20 NOTE — PROGRESS NOTES
4 Eyes Skin Assessment     The patient is being assess for   Admission    I agree that 2 RN's have performed a thorough Head to Toe Skin Assessment on the patient. ALL assessment sites listed below have been assessed. Areas assessed by both nurses:   [x]   Head, Face, and Ears   [x]   Shoulders, Back, and Chest, Abdomen  [x]   Arms, Elbows, and Hands   [x]   Coccyx, Sacrum, and Ischium  [x]   Legs, Feet, and Heels          **SHARE this note so that the co-signing nurse is able to place an eSignature**    Co-signer eSignature: {Esignature:[id number]}    Does the Patient have Skin Breakdown?   No          [place] Prevention initiated:  Yes   Wound Care Orders initiated:  NA      WOC nurse consulted for Pressure Injury (Stage 3,4, Unstageable, DTI, NWPT, Complex wounds)and New or Established Ostomies:  NA      Primary Nurse eSignature: Electronically signed by Nancy Palacios RN on 3/19/21 at 9:15 PM EDT

## 2021-03-20 NOTE — PROGRESS NOTES
Occupational Therapy   Occupational Therapy Initial Assessment  Date: 3/20/2021   Patient Name: Denisse Ellis  MRN: [id number]     : 1950    Date of Service: 3/20/2021    Discharge Recommendations:  Continue to assess pending progress, 24 hour supervision or assist  OT Equipment Recommendations  Equipment Needed: Yes(CTA)    Assessment   Performance deficits / Impairments: Decreased functional mobility ; Decreased endurance;Decreased coordination;Decreased ADL status; Decreased posture;Decreased ROM; Decreased balance;Decreased strength;Decreased safe awareness;Decreased high-level IADLs;Decreased cognition;Decreased vision/visual deficit; Decreased fine motor control    Assessment: Per H&P on 119:70-year-old female to history of HTN, diabetes, and GERD who was admitted on 3/16 with acute left-sided weakness. CT head was unremarkable. CTA with multiple intracranial stenoses and occlusion of the right MCA M3 branch. MRI with acute lacunar infarcts in the right MCA territory. Pt is currently functioning below baseline with transfers, mobility and ADLs. She is primarily mod A with functional transfers. However can be max A pending on level of fatigue. Max A overall with bathing, dressing, toileting. Prior to admission, pt was indep without AD for ADLs, mobility, and was doing all the household tasks. Recommend continued skilled OT services to address the above underlying deficits   In order to ensure a safe transition home. Prognosis: Good  Decision Making: Medium Complexity  OT Education: OT Role;Plan of Care;Transfer Training;ADL Adaptive Strategies; Home Exercise Program  Patient Education: OT POC, disease specific, mobility, safety, fall prev  REQUIRES OT FOLLOW UP: Yes  Activity Tolerance  Activity Tolerance: Patient limited by fatigue;Patient Tolerated treatment well  Safety Devices  Safety Devices in place: Yes  Type of devices: Left in bed;Bed alarm in place;Nurse notified;Call light within reach;Gait belt           Patient Diagnosis(es): There were no encounter diagnoses. has a past medical history of Diabetes mellitus ([address] GERD (gastroesophageal reflux disease), and Hypertension. has no past surgical history on file.            Restrictions  Restrictions/Precautions  Restrictions/Precautions: General Precautions, Fall Risk    Subjective   General  Chart Reviewed: Yes  Patient assessed for rehabilitation services?: Yes  Family / Caregiver Present: No  Referring Practitioner: Louisa Quintanilla MD  Diagnosis: CVA with Left hemiparesis  Subjective  Subjective: Pt agreeable to OT eval    Social/Functional History  Social/Functional History  Lives With: Spouse(\"Morales\")  Type of Home: House  Home Layout: One level  Home Access: Ramped entrance  Bathroom Shower/Tub: Shower chair with back, Tub only, Walk-in shower  Bathroom Toilet: Standard  Bathroom Equipment: Hand-held shower, Shower chair  Bathroom Accessibility: Wheelchair accessible  Home Equipment: (transport wc, adjustable bed (reports too high))  ADL Assistance: Independent  Homemaking Assistance: Independent  Homemaking Responsibilities: Yes  Meal Prep Responsibility: Primary  Laundry Responsibility: Primary  Cleaning Responsibility: Primary  Shopping Responsibility: Primary  Ambulation Assistance: Independent  Transfer Assistance: Independent  Active : Yes  Occupation: Retired  Type of occupation: marketing research,   Leisure & Hobbies: read on my [employer], card games on [employer]  Additional Comments: Denies falls in past 6 months       Objective   Vision: Impaired(intermittent double vision ~1 min; CTA vision)  Vision Exceptions: Wears glasses at all times  Hearing: Within functional limits    Orientation  Overall Orientation Status: Within Functional Limits     Balance  Sitting Balance: Minimal assistance(leans towards the L side as session progressed)  Functional Mobility  Functional - Mobility Device: Wheelchair  Activity: To/from bathroom  Assist Level: Maximum assistance  Functional Mobility Comments: fluctuatd from mod A to max A due to fatigue  Toilet Transfers  Toilet - Technique: Stand pivot  Equipment Used: Grab bars  Toilet Transfer: Moderate assistance  Shower Transfers  Shower - Transfer From: Wheelchair  Shower - Transfer Type: To and From  Shower - Transfer To: Transfer tub bench  Shower - Technique: Lateral;Stand pivot  Shower Transfers: Maximal assistance  Wheelchair Bed Transfers  Wheelchair/Bed - Technique: Stand pivot;Squat pivot  Equipment Used: Wheelchair  Level of Asssistance: Moderate assistance;Maximum assistance  Wheelchair Transfers Comments: towards the R side  ADL  Grooming: Setup(wash face in shower and brush teeth)  UE Bathing: Moderate assistance(assist for BUEs d/t poor sitting balance)  LE Bathing: Moderate assistance(assist for distal LEs and bottom)  UE Dressing: Maximum assistance(assist for threading LUE, pulling around and down)  LE Dressing: Dependent/Total  Toileting: Dependent/Total        Bed mobility  Rolling to Left: Moderate assistance  Rolling to Right: Moderate assistance  Supine to Sit: Unable to assess  Sit to Supine: Maximum assistance  Scooting: Dependent/Total  Transfers  Stand Pivot Transfers: Moderate assistance;Maximum assistance  Sit Pivot Transfers: Moderate assistance;Maximum assistance  Sit to stand:  Moderate assistance  Stand to sit: Moderate assistance  Transfer Comments: fluctuated from mod to max A d/t fatigue     Cognition  Overall Cognitive Status: WFL        Sensation  Overall Sensation Status: WFL  Light Touch: Partial deficits in the LUE        LUE AROM (degrees)  LUE General AROM: trace scapular retraction/protraction, 3/4 hand flexion  RUE AROM (degrees)  RUE AROM : WFL      Plan   Plan  Times per week: 5-7 days/ wk  Times per day: Daily  Plan weeks: 2 weeks  Current Treatment Recommendations: Balance Training, Functional Mobility Training, Safety Education & Training, Positioning, Neuromuscular Re-education, Self-Care / ADL, Strengthening, Gait Training, Patient/Caregiver Education & Training, Stair training, ROM, Equipment Evaluation, Education, & procurement, Home Management Training, Endurance Training, Pain Management, Wheelchair Mobility Training, Cognitive/Perceptual Training, Cognitive Reorientation, Modalities (comment)    Goals  Short term goals  Time Frame for Short term goals: 1 week (3-26-21)  Short term goal 1: Pt will complete functional transfers mod A  Short term goal 2: Pt will complete UBD using bisi-techniques mod A  Short term goal 3: min assist with LUE self ROM/positioning  Short term goal 4: Pt will complete toileting mod A  Long term goals  Time Frame for Long term goals : 2 weeks (4-2-21)  Long term goal 1: Pt will complete functional transfers SBA  Long term goal 2: Pt will complete UBD using bisi-techniques SBA  Long term goal 3: Pt will complete toileting CGA  Patient Goals   Patient goals : \"get stronger so I can get back to my normal routines\"       Therapy Time   Individual Concurrent Group Co-treatment   Time In 1000         Time Out 1130         Minutes 90         Timed Code Treatment Minutes: [facility],

## 2021-03-20 NOTE — PROGRESS NOTES
Speech Language Pathology  Facility/Department: [facility]   CLINICAL BEDSIDE SWALLOW EVALUATION/EVAL ONLY    NAME: Biju Canas  : 1950  MRN: [id number]    ADMISSION DATE: 3/19/2021  ADMITTING DIAGNOSIS: has TIA (transient ischemic attack); DM (diabetes mellitus), type 2, uncontrolled with complications ([facility] Essential hypertension with goal blood pressure less than 140/90; Hypertriglyceridemia; HTN (hypertension), benign; Dyslipidemia; Acute left-sided weakness; Cerebrovascular accident (CVA) ([facility] DM (diabetes mellitus), secondary, uncontrolled, w/neurologic complic ([facility] and Acute ischemic stroke [facility]) on their problem list.  DATE ONSET: 3/16/21     Date of Eval: 3/20/2021   Evaluating Therapist: HETAL Lopez     RECENT RESULTS  CT OF HEAD/MRI: 3/16/21  Impression   Acute lacunar infarcts within the right MCA vascular territory.       The findings were sent to the Radiology Results [address] at 4:23   pm on 3/16/2021to be communicated to a licensed caregiver.            Primary Complaint: Per H&P on 119:70-year-old female to history of HTN, diabetes, and GERD who was admitted on 3/16 with acute left-sided weakness. CT head was unremarkable. CTA with multiple intracranial stenoses and occlusion of the right MCA M3 branch. MRI with acute lacunar infarcts in the right MCA territory.      Current Diet level:  Current Diet : Regular (diabetic)  Current Liquid Diet : Thin    Pain:  Pain Assessment  Pain Assessment: Faces  Pain Level: 0  Pain Type: Chronic pain  Pain Location: Back  Pain Orientation: Lower, Mid  Pain Descriptors: Aching  Non-Pharmaceutical Pain Intervention(s): Repositioned, Emotional support  Response to Pain Intervention: Patient Satisfied    Reason for Referral  Biju Canas was referred for a bedside swallow evaluation to assess the efficiency of her swallow function, identify signs and symptoms of aspiration and make recommendations regarding safe dietary Patient Education: Pt educated on role of ST, reason for referral, assessment and results. Patient Education Response: Verbalizes understanding  Safety Devices in place: Yes  Type of devices: Left in bed;Nurse notified;Call light within reach; All fall risk precautions in place       Therapy Time  SLP Individual Minutes  Time In: 9856  [facility]: [facility]  Minutes: [address] .[address]  Speech Language Pathologist  3/20/2021

## 2021-03-21 LAB
GLUCOSE BLD-MCNC: 152 MG/DL (ref 70–99)
GLUCOSE BLD-MCNC: 152 MG/DL (ref 70–99)
GLUCOSE BLD-MCNC: 163 MG/DL (ref 70–99)
GLUCOSE BLD-MCNC: 198 MG/DL (ref 70–99)
PERFORMED ON: ABNORMAL

## 2021-03-21 PROCEDURE — 6360000002 HC RX W HCPCS: Performed by: PHYSICAL MEDICINE & REHABILITATION

## 2021-03-21 PROCEDURE — 1280000000 HC REHAB R&B

## 2021-03-21 PROCEDURE — 6370000000 HC RX 637 (ALT 250 FOR IP): Performed by: PHYSICAL MEDICINE & REHABILITATION

## 2021-03-21 RX ADMIN — INSULIN LISPRO 2 UNITS: 100 INJECTION, SOLUTION INTRAVENOUS; SUBCUTANEOUS at 11:57

## 2021-03-21 RX ADMIN — PROPRANOLOL HYDROCHLORIDE 80 MG: 40 TABLET ORAL at 10:07

## 2021-03-21 RX ADMIN — Medication 5 MG: at 20:24

## 2021-03-21 RX ADMIN — INSULIN LISPRO 1 UNITS: 100 INJECTION, SOLUTION INTRAVENOUS; SUBCUTANEOUS at 20:25

## 2021-03-21 RX ADMIN — INSULIN GLARGINE 20 UNITS: 100 INJECTION, SOLUTION SUBCUTANEOUS at 20:24

## 2021-03-21 RX ADMIN — ASPIRIN 81 MG: 81 TABLET, CHEWABLE ORAL at 10:06

## 2021-03-21 RX ADMIN — ENOXAPARIN SODIUM 40 MG: 40 INJECTION SUBCUTANEOUS at 10:06

## 2021-03-21 RX ADMIN — INSULIN LISPRO 2 UNITS: 100 INJECTION, SOLUTION INTRAVENOUS; SUBCUTANEOUS at 16:58

## 2021-03-21 RX ADMIN — AMLODIPINE BESYLATE 5 MG: 5 TABLET ORAL at 10:06

## 2021-03-21 RX ADMIN — CETIRIZINE HYDROCHLORIDE 10 MG: 10 TABLET, FILM COATED ORAL at 10:06

## 2021-03-21 RX ADMIN — ALLOPURINOL 300 MG: 300 TABLET ORAL at 10:07

## 2021-03-21 RX ADMIN — ATORVASTATIN CALCIUM 80 MG: 80 TABLET, FILM COATED ORAL at 20:24

## 2021-03-21 RX ADMIN — INSULIN LISPRO 2 UNITS: 100 INJECTION, SOLUTION INTRAVENOUS; SUBCUTANEOUS at 06:37

## 2021-03-21 NOTE — PROGRESS NOTES
Shift assessment completed per documentation. Pt A&Ox4. VSS. Pt awake in bed. Pt denies pain and discomfort at this time. Pt repositioned. Bilateral lung sounds clear. Bed locked and in lowest position. Bed alarm on. Side rails up 2/4. Call light and personal belongings within reach. Will continue to monitor.

## 2021-03-22 LAB
ANION GAP SERPL CALCULATED.3IONS-SCNC: 10 MMOL/L (ref 3–16)
BUN BLDV-MCNC: 28 MG/DL (ref 7–20)
CALCIUM SERPL-MCNC: 10.8 MG/DL (ref 8.3–10.6)
CHLORIDE BLD-SCNC: 105 MMOL/L (ref 99–110)
CO2: 26 MMOL/L (ref 21–32)
CREAT SERPL-MCNC: 1.2 MG/DL (ref 0.6–1.2)
GFR AFRICAN AMERICAN: 54
GFR NON-AFRICAN AMERICAN: 44
GLUCOSE BLD-MCNC: 157 MG/DL (ref 70–99)
GLUCOSE BLD-MCNC: 163 MG/DL (ref 70–99)
GLUCOSE BLD-MCNC: 166 MG/DL (ref 70–99)
GLUCOSE BLD-MCNC: 181 MG/DL (ref 70–99)
GLUCOSE BLD-MCNC: 214 MG/DL (ref 70–99)
HCT VFR BLD CALC: 40.3 % (ref 36–48)
HEMOGLOBIN: 13.9 G/DL (ref 12–16)
MCH RBC QN AUTO: 28.3 PG (ref 26–34)
MCHC RBC AUTO-ENTMCNC: 34.6 G/DL (ref 31–36)
MCV RBC AUTO: 81.8 FL (ref 80–100)
PDW BLD-RTO: 14 % (ref 12.4–15.4)
PERFORMED ON: ABNORMAL
PLATELET # BLD: 109 K/UL (ref 135–450)
PMV BLD AUTO: 7.7 FL (ref 5–10.5)
POTASSIUM SERPL-SCNC: 3.5 MMOL/L (ref 3.5–5.1)
RBC # BLD: 4.92 M/UL (ref 4–5.2)
SODIUM BLD-SCNC: 141 MMOL/L (ref 136–145)
WBC # BLD: 4.9 K/UL (ref 4–11)

## 2021-03-22 PROCEDURE — 80048 BASIC METABOLIC PNL TOTAL CA: CPT

## 2021-03-22 PROCEDURE — 1280000000 HC REHAB R&B

## 2021-03-22 PROCEDURE — 97530 THERAPEUTIC ACTIVITIES: CPT

## 2021-03-22 PROCEDURE — 36415 COLL VENOUS BLD VENIPUNCTURE: CPT

## 2021-03-22 PROCEDURE — 6370000000 HC RX 637 (ALT 250 FOR IP): Performed by: PHYSICAL MEDICINE & REHABILITATION

## 2021-03-22 PROCEDURE — 92507 TX SP LANG VOICE COMM INDIV: CPT

## 2021-03-22 PROCEDURE — 97110 THERAPEUTIC EXERCISES: CPT

## 2021-03-22 PROCEDURE — 85027 COMPLETE CBC AUTOMATED: CPT

## 2021-03-22 PROCEDURE — 97112 NEUROMUSCULAR REEDUCATION: CPT

## 2021-03-22 PROCEDURE — 97535 SELF CARE MNGMENT TRAINING: CPT

## 2021-03-22 RX ADMIN — ATORVASTATIN CALCIUM 80 MG: 80 TABLET, FILM COATED ORAL at 20:40

## 2021-03-22 RX ADMIN — ACETAMINOPHEN 650 MG: 325 TABLET ORAL at 20:39

## 2021-03-22 RX ADMIN — ACETAMINOPHEN 650 MG: 325 TABLET ORAL at 04:58

## 2021-03-22 RX ADMIN — ACETAMINOPHEN 650 MG: 325 TABLET ORAL at 10:54

## 2021-03-22 RX ADMIN — INSULIN LISPRO 2 UNITS: 100 INJECTION, SOLUTION INTRAVENOUS; SUBCUTANEOUS at 17:05

## 2021-03-22 RX ADMIN — CETIRIZINE HYDROCHLORIDE 10 MG: 10 TABLET, FILM COATED ORAL at 07:47

## 2021-03-22 RX ADMIN — ASPIRIN 81 MG: 81 TABLET, CHEWABLE ORAL at 07:47

## 2021-03-22 RX ADMIN — AMLODIPINE BESYLATE 5 MG: 5 TABLET ORAL at 07:47

## 2021-03-22 RX ADMIN — INSULIN LISPRO 2 UNITS: 100 INJECTION, SOLUTION INTRAVENOUS; SUBCUTANEOUS at 06:55

## 2021-03-22 RX ADMIN — PROPRANOLOL HYDROCHLORIDE 80 MG: 40 TABLET ORAL at 07:46

## 2021-03-22 RX ADMIN — INSULIN GLARGINE 20 UNITS: 100 INJECTION, SOLUTION SUBCUTANEOUS at 20:40

## 2021-03-22 RX ADMIN — Medication 5 MG: at 20:40

## 2021-03-22 RX ADMIN — ALLOPURINOL 300 MG: 300 TABLET ORAL at 07:47

## 2021-03-22 RX ADMIN — INSULIN LISPRO 1 UNITS: 100 INJECTION, SOLUTION INTRAVENOUS; SUBCUTANEOUS at 20:40

## 2021-03-22 RX ADMIN — INSULIN LISPRO 4 UNITS: 100 INJECTION, SOLUTION INTRAVENOUS; SUBCUTANEOUS at 11:48

## 2021-03-22 ASSESSMENT — PAIN SCALES - GENERAL
PAINLEVEL_OUTOF10: 4
PAINLEVEL_OUTOF10: 4
PAINLEVEL_OUTOF10: 9
PAINLEVEL_OUTOF10: 7
PAINLEVEL_OUTOF10: 4

## 2021-03-22 ASSESSMENT — PAIN DESCRIPTION - DESCRIPTORS
DESCRIPTORS: ACHING;CRAMPING
DESCRIPTORS: ACHING;CRAMPING
DESCRIPTORS: SHARP

## 2021-03-22 ASSESSMENT — PAIN DESCRIPTION - PAIN TYPE
TYPE: CHRONIC PAIN
TYPE: ACUTE PAIN

## 2021-03-22 ASSESSMENT — PAIN DESCRIPTION - LOCATION: LOCATION: HIP

## 2021-03-22 NOTE — PROGRESS NOTES
Physical Therapy  Facility/Department: [facility]  Daily Treatment Note  NAME: Sil Watson  : 1950  MRN: [id number]    Date of Service: 3/22/2021    Discharge Recommendations:  24 hour supervision or assist, Home with Home health PT   PT Equipment Recommendations  Other: Continue to assess pending pt's progress. Assessment   Body structures, Functions, Activity limitations: Decreased functional mobility ; Decreased endurance;Decreased ROM; Decreased balance;Decreased posture;Decreased coordination;Decreased strength;Decreased fine motor control  Assessment: Pt is pleasant and agreeable to PT tx and demonstrates good participation throughout session. Pt does report L sided cramping abdominal/back pain limiting mobility with RN aware and providing tylenol at end of session. Pt completes bed mobilty with Piotr, t/f with maxA without AD and Piotr in // bars. Pt requires blocking to L knee with all t/f and standing d/t buckling and increaed hip ER/ABD. Pt participates in standing activities to promote WB to LUE/LLE as well as dynamic reaching and WB in sitting to improve tone, motor control and strength. Pt will benefit from continued skilled PT in ARU to address above deficits, will continue to progress mobility as toleraed. Treatment Diagnosis: impaired mobility  Prognosis: Good  Decision Making: Medium Complexity  PT Education: Goals;Gait Training;PT Role;Disease Specific Education;Plan of Care; Functional Mobility Training;Transfer Training;Home Exercise Program;Precautions;General Safety;Weight-bearing Education  Patient Education: Pt educated on the importance of WB through left LE and UE for NM re-ed, educated on sequencing and positioning for safety. Pt will benefit from additional education. Barriers to Learning: no  REQUIRES PT FOLLOW UP: Yes  Activity Tolerance  Activity Tolerance: Patient Tolerated treatment well;Patient limited by pain  Activity Tolerance: /73, HR 71, SpO2 96% on RA.  Pt Poor;+                           Addendum: 2nd session  Pt seen in pm for second PT session, pleasant and agreeable, continues to report L side/back \"cramping pain\". Heat pack donned to R side/back at end of session. Pt reports fatigue but agreeable to participation. Bed mobility:   Sit to supine with BR modA for BLE, increased time cues for sequence  Transfers   SPT recliner to EOB no AD maxA, PT blocking L knee to prevent buckling and decrease hip ER/ABD  Therapeutic exercise:   Supine BLE exercises:    AP BLE x 15    Heel slide RLE x 15, LLE x 10 (modA, tactile tapping to quad))    Hip abduction LLE x 10 modA, x 2 RLE (d/c d/t reports of increased back pain)    BLE GS x 15    BLE QS x 15    SAQ BLE x 10 (tactile tapping to L quad)    B hooklying trunk rotation x 10 (Piotr to maintain LLE positioning)   Cues provided for sequence/technique, intermittent rest break dt/ fatigue/pain. Pt encouraged/educated to complete GS, QS, AP outside of PT to continue improvements in strength. Pt supine in bed at end of session with bed alarm in place and all needs within reach. Goals  Short term goals  Time Frame for Short term goals: 1 week, 3/26/21  Short term goal 1: Pt will complete bed mobility with SBA and no rails. Short term goal 2: Pt will complete sit<>stand with Min A with LRAD. Short term goal 3: Pt will ambulate 100 feet with LRAD and Mod Ax1. Short term goal 4: Pt will complete bed<>chair transfer with LRAD and Min A. Short term goal 5: Pt will complete 150 feet of w/c mobility with multiple turns and IND. Long term goals  Time Frame for Long term goals : 2 weeks, by 4/2/21  Long term goal 1: Pt will complete bed mobility with independence in flat bed without rails. Long term goal 2: Pt will complete sit<>stands with independence and LRAD. Long term goal 3: Pt will complete bed<>Chair transfer with LRAD and independence. Long term goal 4: Pt will ambulate 100 feet with LRAD and independence.   Long term goal 5: Pt will ascend/descend 4 steps with bilateral rails and CGA. Patient Goals   Patient goals : To get stronger and go home. Plan    Plan  Times per week: 5 out of 7  Times per day: Daily  Plan weeks: 2 weeks, by 4/2/21  Current Treatment Recommendations: Strengthening, Neuromuscular Re-education, Home Exercise Program, ROM, Balance Training, Endurance Training, Functional Mobility Training, Transfer Training, Gait Training, Stair training, Wheelchair Mobility Training, Safety Education & Training, Patient/Caregiver Education & Training, Equipment Evaluation, Education, & procurement  Safety Devices  Type of devices:  All fall risk precautions in place, Nurse notified, Gait belt, Patient at risk for falls, Chair alarm in place, Left in chair, Call light within reach(RN present in room with pt at end of session)     Therapy Time   Individual Concurrent Group Co-treatment   Time In 1000         Time Out 1100         Minutes 60         Timed Code Treatment Minutes: [address] Therapy Time:   Individual Concurrent Group Co-treatment   Time In 1200         Time Out 1230         Minutes 30           Timed Code Treatment Minutes:  30 minutes    Total Treatment Minutes:  90 minutes    Leodan Haile, PT, DPT

## 2021-03-22 NOTE — PLAN OF CARE
Problem: Falls - Risk of:  Goal: Will remain free from falls  Description: Will remain free from falls  Outcome: Ongoing  Note: Pt stand/pivoted from w/c to chair with staff X 1 without issue this shift. Chair wheels locked, alarm on. Pt call light not working despite multiple attempts from staff to fix. Opened ticket for maintenance to fix. Pt oriented to telephone in room and taught to call nurse's station if needs should arise. Pt agreeable to this and returned demonstration correctly. Hourly rounding continues as per protocol. Nursing will continue to monitor for changes.

## 2021-03-22 NOTE — CARE COORDINATION
Case Management ARU Admission Assessment     Objective:  met with the patient to complete initial assessment and review the role of Case Management while on the ARU. Patient educated on team conferences. Discussed family training with the patient/family on how it is encouraged on the unit. Order for \"discharge planning\" has been addressed. Family Present: none   Primary : Pt spouse     Admit date:  3/19/2021                          Insurance: [employer] and Medicare     Admitting diagnosis: Acute Ischemic Stroke    Current home situation: Pt reported that she lives with her spouse in a one level home with a ramp. Pt said she was IPTA. DME at home: none     Services prior to admission: none     Preference for [facility] or Outpatient therapy: [facility]    Patient's goal(s): to return home      Working or [employer] prior to admission:  __Yes _x_No                        Accessibility to community resources/transportation:    Pt spouse drives     Active with: none   __Senior Services      __Council on Aging  __Other    Has patient experienced a recent loss or significant life event that would impact their care or ability to participate?  x__No  __Yes, please explain:    Has patient ever been treated for emotional disorder(s)? x__No  __Yes, how does this affect current situation? Is patient and family coping appropriately with stressful events and this hospitalization? _x_Yes  __No, please explain:    Support system at home and in the community: Pt spouse is her support     Caregiver physical ability: Pt said her spouse tries to help her but he needs to have training.  \"it was like wrestling with a bear\"    Who will be the one to contact for family training: Spouse     24 hour care on discharge: spouse     What level does the Patient need to be at to return home: TBD     Discharge plan: home with Spouse     PCP: Lee Ann 97: Meds to Beds     Summary: Pt reported that she plans to

## 2021-03-22 NOTE — PROGRESS NOTES
assistance;Maximum assistance  Sit to stand: Moderate assistance  Stand to sit: Moderate assistance                       Cognition  Overall Cognitive Status: Exceptions  Arousal/Alertness: Appropriate responses to stimuli  Following Commands:  Follows one step commands consistently  Attention Span: Attends with cues to redirect  Memory: Appears intact  Problem Solving: Assistance required to identify errors made;Assistance required to correct errors made  Insights: Decreased awareness of deficits  Sequencing: Requires cues for some     Perception  Unilateral Attention: Cues to maintain midline in sitting         Plan   Plan  Times per week: 5-7 days/ wk  Times per day: Daily  Plan weeks: 2 weeks  Current Treatment Recommendations: Balance Training, Functional Mobility Training, Safety Education & Training, Positioning, Neuromuscular Re-education, Self-Care / ADL, Strengthening, Gait Training, Patient/Caregiver Education & Training, Stair training, ROM, Equipment Evaluation, Education, & procurement, Home Management Training, Endurance Training, Pain Management, Wheelchair Mobility Training, Cognitive/Perceptual Training, Cognitive Reorientation, Modalities (comment)    Goals  Short term goals  Time Frame for Short term goals: 1 week (3-26-21)  Short term goal 1: Pt will complete functional transfers mod A  Short term goal 2: Pt will complete UBD using bisi-techniques mod A  Short term goal 3: min assist with LUE self ROM/positioning  Short term goal 4: Pt will complete toileting mod A  Long term goals  Time Frame for Long term goals : 2 weeks (4-2-21)  Long term goal 1: Pt will complete functional transfers SBA  Long term goal 2: Pt will complete UBD using bisi-techniques SBA  Long term goal 3: Pt will complete toileting CGA  Patient Goals   Patient goals : \"get stronger so I can get back to my normal routines\"       Therapy Time   Individual Concurrent Group Co-treatment   Time In 0800         Time Out 0830 Minutes 30         Timed Code Treatment Minutes: Susan 26, OT

## 2021-03-22 NOTE — PROGRESS NOTES
Speech Language Pathology  [facility]: ACUTE REHAB UNIT  SPEECH-LANGUAGE PATHOLOGY      [x] Daily  [] Weekly Care Conference Note  [] Discharge    [address]      BBU:[id number]/5601  WKD:[id number]  Rehab Dx/Hx: Acute ischemic stroke ([facility] [address] [I63.9]    Precautions: falls  Home situation: lives independently, manages all household tasks, active    ST Dx: [] Aphasia  [x] Dysarthria  [] Apraxia   [] Oropharyngeal dysphagia [] Cognitive Impairment  [] Other:   Date of Admit: 3/19/2021  Room #: 0156/0156-01    Current functional status (updated daily):         Pt being seen for : [x] Speech/Language Treatment  [] Dysphagia Treatment [] Cognitive Treatment  [] Other:  Communication: []WFL  [] Aphasia  [x] Dysarthria  [] Apraxia  [] Pragmatic Impairment [] Non-verbal  [] Hearing Loss  [] Other:   Cognition: [x] WFL  [] Mild  [] Moderate  [] Severe [] Unable to Assess  [] Other:  Memory: [x] WFL  [] Mild  [] Moderate  [] Severe [] Unable to Assess  [] Other:  Behavior: [x] Alert  [x] Cooperative  [x]  Pleasant  [] Confused  [] Agitated  [] Uncooperative  [] Distractible [] Motivated  [] Self-Limiting [] Anxious  [] Other:  Endurance:  [x] Adequate for participation in SLP sessions  [] Reduced overall  [] Lethargic  [] Other:  Safety: [x] No concerns at this time  [] Reduced insight into deficits  []  Reduced safety awareness [] Not following call light procedures  [] Unable to Assess  [] Other:    Current Diet Order:DIET GENERAL; Carb Control: 4 carb choices (60 gms)/meal  Swallowing Precautions: Sit up for all meals and thereafter for 30 minutes, Eat with small bites (1/2 tsp; 1 tsp), Alternate solids with liquids and Take small sips of water at the end of snacks and meals        Date: 3/22/2021      Tx session 1  [id number]   Tx session 2  All tx needs met in session 1   Total Timed Code Min 0    Total Treatment Minutes 30    Individual Treatment Minutes 30    Group Treatment Minutes 0 0   Co-Treat Minutes 0 0   Variance/Reason:  0    Pain None reported    Pain Intervention [] RN notified  [] Repositioned  [] Intervention offered and patient declined  [x] N/A  [] Other: [] RN notified  [] Repositioned  [] Intervention offered and patient declined  [] N/A  [] Other:   Subjective     Pt sitting upright in bed, alert and oriented. Pt cooperative and agreeable to participate. Objective:  Goals     Short-term Goals  Timeframe for Short-term Goals: at least 5x/week for 10 days by 3/30/21    Goal 1: Patient will be able to demonstrate appropriate diaphragmatic breathing and completion of exercises for speech production with min cues. SLP and pt discussed and reviewed appropriate completion of diaphragmatic breathing and appropriate positioning. Pt completed exercises: x10 with min cues provided     Goal 2: Patient will increase maximum phonation time to 12 seconds. Did not target. Goal 3: Patient will demonstrate use of speech compensatory stategies with 90% accuracy   SLP introduced use of 'SLOB' acronym (slow, loud, over articulation, adequate breath support)    -3, 4, 5 word syllables: 100% accuracy, min cues required for increased vocal volume      Goal 4: Patient will demonstrate adeqaute breath support with 95% accuracy for the  sentence level. 5-7 word sentences: 90% accuracy    10+word sentences: 75% accuracy, requiring cueing for use of pause breaks to maintain adequate breath support, increased vocal volume, and slower rate. *New goal added 03/22/21*  Goal 5. The patient will say single sentences as 65 dB SPL on average, with min cues. Did not target, discussed use of decibel meter with pt today, will target tomorrow.     Other areas targeted: N/A    Education:   Edu provided re: use of speech strategies for improved intelligibility, diaphragmatic breathing exercises       Safety Devices: [x] Call light within reach  [] Chair alarm activated  [x] Bed alarm activated  [] Other: [] Call light within reach  [] Chair alarm activated  [] Bed alarm activated  [] Other:    Assessment: Pt pleasant and cooperative, agreeable to participate. SLP introduced use of speech strategies for improved intelligibility with use of SLOB acronym (slow, loud, over articulation, breath support) pt receptive to education. Pt completed three, four, and five word syllables with 100% accuracy, 5-7 word sentences with 90% accuracy min cues required for slow rate and loudness, and 10+ word sentences with 75% accuracy, cueing required for pause breaks, adequate breath support and increased volume. SLP also provided education re use of decibel meter to measure pt's vocal volume. Pt is strongly motivated to improve, continue goals as listed above. Plan: Continue as per plan of care. Additional Information:     Barriers toward progress: None   Discharge recommendations:  [] Home independently  [] Home with assistance []  24 hour supervision  [] ECF [] Other:  Continued Tx Upon Discharge: ? [] Yes [] No [] TBD based on progress while on ARU [] Vital Stim indicated [] Other:   Estimated discharge date: TBD  Interventions used this date:  [] Speech/Language Treatment  [] Instruction in HEP [] Group [] Dysphagia Treatment [] Cognitive Treatment   [] Other: Total Time Breakdown / Charges    Time in Time out Total Time / units   Cognitive Tx -- -- --   Speech Tx 0930 1000 30 min/ 1 unit   Dysphagia Tx -- -- --       Electronically Signed by     Leti Costello. A CCC-SLP  Speech-Language Pathologist  [id number]

## 2021-03-22 NOTE — PROGRESS NOTES
Abril Petersen  3/22/2021  [id number]    Chief Complaint: Acute ischemic stroke [facility])    Subjective:   Resting in bed; no issues overnight; no complaints. ROS: no n/v, cp, sob, f/c  Objective:  Patient Vitals for the past 24 hrs:   BP Temp Temp src Pulse Resp SpO2   03/22/21 0730 (!) 143/73 98.4 °F (36.9 °C) Oral 71 16 96 %   03/21/21 2021 (!) 149/79 98.2 °F (36.8 °C) Oral 67 16 98 %     Gen: No distress, pleasant. HEENT: Normocephalic, atraumatic. CV: Regular rate and rhythm. Resp: No respiratory distress. Abd: Soft, nontender   Ext: No edema. Neuro: Alert, oriented, appropriately interactive. Wt Readings from Last 3 Encounters:   03/21/21 153 lb 14.1 oz (69.8 kg)   03/16/21 162 lb (73.5 kg)   07/14/20 158 lb 14.4 oz (72.1 kg)       Laboratory data:   Lab Results   Component Value Date    WBC 4.9 03/22/2021    HGB 13.9 03/22/2021    HCT 40.3 03/22/2021    MCV 81.8 03/22/2021     (L) 03/22/2021       Lab Results   Component Value Date     03/22/2021    K 3.5 03/22/2021    K 3.4 07/14/2020     03/22/2021    CO2 26 03/22/2021    BUN 28 03/22/2021    CREATININE 1.2 03/22/2021    GLUCOSE 163 03/22/2021    CALCIUM 10.8 03/22/2021        Therapy progress:  PT  Position Activity Restriction  Other position/activity restrictions: up with assist  Objective     Sit to Stand: Maximum Assistance  Stand to sit: Moderate Assistance  Bed to Chair: Maximum assistance  Device: Rolling Walker  Assistance: Maximum assistance  Distance: 8 feet  OT  PT Equipment Recommendations  Other: Continue to assess pending pt's progress. Toilet - Technique: Sit pivot  Equipment Used: Extra wide bedside commode  Toilet Transfers Comments: requires max assist in order to scoot back on commode  Assessment        SLP  Current Diet : Regular  Current Liquid Diet : Thin  Diet Solids Recommendation: Regular  Liquid Consistency Recommendation: Thin    Body mass index is 30.05 kg/m².     Assessment and Plan:  Acute ischemic infarct: ASA, statin.  PT/OT/SLP     Diabetes: Uncontrolled, Lantus 20, SSI; may increase lantus tomorrow     HTN: norvasc 5, propranolol 80; increase norvasc tomorrow AM     HLD: significantly elevated triglycerides, Lipitor 80     ELX: Push PO. Patient prefers no IVF     Bowel: Per protocol  Bladder: Per protocol  Sleep: Trazodone PRN  Pain: tylenol, tramadol  DVT PPx: lovenox  ELOS: 17-21       Home Miranda MD 3/22/2021, 9:18 AM

## 2021-03-22 NOTE — PROGRESS NOTES
Occupational Therapy  Facility/Department: [facility] ARU  Daily Treatment Note  NAME: Nicol Light  : 1950  MRN: [id number]    Date of Service: 3/22/2021    Discharge Recommendations:  Continue to assess pending progress, 24 hour supervision or assist       Assessment   Performance deficits / Impairments: Decreased functional mobility ; Decreased endurance;Decreased coordination;Decreased ADL status; Decreased posture;Decreased ROM; Decreased balance;Decreased strength;Decreased safe awareness;Decreased high-level IADLs;Decreased cognition;Decreased vision/visual deficit; Decreased fine motor control  Assessment: Pt pleasant and cooperative, limited by back spasms this date. Pt with improved AROM, GMC, [address] [place] and control of L UE from eval. Pt able to retrieve small bean bags and place in basket, able to retrieve small items from bin of rice with L UE with increased time. Issued blue foam block to squeeze, as pt reports \"squeezing my hand is the hardest thing right now\". Pt  ed on tactile and sensory stim info for L UE. Pt verb understanding, cont POC. OT Education: OT Role;Plan of Care;Transfer Training;ADL Adaptive Strategies; Home Exercise Program  Patient Education: OT POC, disease specific, mobility, safety, fall prev  Activity Tolerance  Activity Tolerance: Patient Tolerated treatment well  Activity Tolerance: 142/78, HR 76 O2 97% on RA  Safety Devices  Safety Devices in place: Yes  Type of devices: Nurse notified; Left in bed;Bed alarm in place;Call light within reach         Patient Diagnosis(es): There were no encounter diagnoses. has a past medical history of Diabetes mellitus ([address] GERD (gastroesophageal reflux disease), and Hypertension. has no past surgical history on file. Restrictions  Restrictions/Precautions  Restrictions/Precautions: General Precautions, Fall Risk  Position Activity Restriction  Other position/activity restrictions: up with assist  Subjective   General  Chart Reviewed:  Yes Patient assessed for rehabilitation services?: Yes  Family / Caregiver Present: No  Referring Practitioner: Alem Karimi MD  Diagnosis: CVA with Left hemiparesis  Subjective  Subjective: Pt supine, reports back spasms but willing to try  General Comment  Comments: RN cleared pt for OT; pt resting in bed, agreeable to therapy  Pain Assessment  Pain Level: 4  Pain Type: Acute pain  Pain Location: Back  Pain Orientation: Right; Lower  Pain Descriptors: Aching;Cramping  Vital Signs  Patient Currently in Pain: Yes   Orientation  Orientation  Overall Orientation Status: Within Functional Limits  Objective    ADL  Feeding: Setup  Grooming: Setup                       Coordination  Fine Motor: pt able to  small bean bags x 4using lateral grasp with cues to use thumb, able to  small items from bin of rice with min-mod difficulty     Sensory Integration  Sensory integration: Yes  Tactile Stimulation: ed pt on rubbing L UE with differing fabrics and applying lotion, etc to improve sensory input and decrease numb/tingling sensations        Cognition  Overall Cognitive Status: Exceptions  Arousal/Alertness: Appropriate responses to stimuli  Following Commands:  Follows one step commands consistently  Attention Span: Attends with cues to redirect  Memory: Appears intact  Problem Solving: Assistance required to identify errors made;Assistance required to correct errors made  Insights: Decreased awareness of deficits  Sequencing: Requires cues for some                    Type of ROM/Therapeutic Exercise  Type of ROM/Therapeutic Exercise: AAROM;AROM  Exercises  Shoulder Flexion: AROM to ~30-45* L UE, pt able to perfrom AAROM to ~90* x10 reps  Elbow Flexion: x10  Elbow Extension: x10  Supination: x10  Pronation: x10  Wrist Flexion: x10  Wrist Extension: x10  Finger Flexion: x10  Finger Extension: x10                    Plan   Plan  Times per week: 5-7 days/ wk  Times per day: Daily  Plan weeks: 2 weeks  Current Treatment Recommendations: Balance Training, Functional Mobility Training, Safety Education & Training, Positioning, Neuromuscular Re-education, Self-Care / ADL, Strengthening, Gait Training, Patient/Caregiver Education & Training, Stair training, ROM, Equipment Evaluation, Education, & procurement, Home Management Training, Endurance Training, Pain Management, Wheelchair Mobility Training, Cognitive/Perceptual Training, Cognitive Reorientation, Modalities (comment)    Goals  Short term goals  Time Frame for Short term goals: 1 week (3-26-21)  Short term goal 1: Pt will complete functional transfers mod A  Short term goal 2: Pt will complete UBD using bisi-techniques mod A  Short term goal 3: min assist with LUE self ROM/positioning  Short term goal 4: Pt will complete toileting mod A  Long term goals  Time Frame for Long term goals : 2 weeks (4-2-21)  Long term goal 1: Pt will complete functional transfers SBA  Long term goal 2: Pt will complete UBD using bisi-techniques SBA  Long term goal 3: Pt will complete toileting CGA  Patient Goals   Patient goals : \"get stronger so I can get back to my normal routines\"       Therapy Time   Individual Concurrent Group Co-treatment   Time In 1400         Time Out 1510         Minutes 70         Timed Code Treatment Minutes: Sydnee 49, OT

## 2021-03-23 LAB
GLUCOSE BLD-MCNC: 139 MG/DL (ref 70–99)
GLUCOSE BLD-MCNC: 147 MG/DL (ref 70–99)
GLUCOSE BLD-MCNC: 156 MG/DL (ref 70–99)
GLUCOSE BLD-MCNC: 178 MG/DL (ref 70–99)
PERFORMED ON: ABNORMAL

## 2021-03-23 PROCEDURE — 97535 SELF CARE MNGMENT TRAINING: CPT

## 2021-03-23 PROCEDURE — 97110 THERAPEUTIC EXERCISES: CPT

## 2021-03-23 PROCEDURE — 97542 WHEELCHAIR MNGMENT TRAINING: CPT

## 2021-03-23 PROCEDURE — 97530 THERAPEUTIC ACTIVITIES: CPT

## 2021-03-23 PROCEDURE — 6370000000 HC RX 637 (ALT 250 FOR IP): Performed by: PHYSICAL MEDICINE & REHABILITATION

## 2021-03-23 PROCEDURE — 1280000000 HC REHAB R&B

## 2021-03-23 PROCEDURE — 97116 GAIT TRAINING THERAPY: CPT

## 2021-03-23 PROCEDURE — 92507 TX SP LANG VOICE COMM INDIV: CPT

## 2021-03-23 RX ADMIN — PROPRANOLOL HYDROCHLORIDE 80 MG: 40 TABLET ORAL at 08:41

## 2021-03-23 RX ADMIN — Medication 5 MG: at 20:24

## 2021-03-23 RX ADMIN — TRAMADOL HYDROCHLORIDE 50 MG: 50 TABLET, FILM COATED ORAL at 21:31

## 2021-03-23 RX ADMIN — INSULIN LISPRO 2 UNITS: 100 INJECTION, SOLUTION INTRAVENOUS; SUBCUTANEOUS at 16:32

## 2021-03-23 RX ADMIN — ATORVASTATIN CALCIUM 80 MG: 80 TABLET, FILM COATED ORAL at 20:24

## 2021-03-23 RX ADMIN — CETIRIZINE HYDROCHLORIDE 10 MG: 10 TABLET, FILM COATED ORAL at 08:42

## 2021-03-23 RX ADMIN — INSULIN LISPRO 2 UNITS: 100 INJECTION, SOLUTION INTRAVENOUS; SUBCUTANEOUS at 06:30

## 2021-03-23 RX ADMIN — INSULIN GLARGINE 20 UNITS: 100 INJECTION, SOLUTION SUBCUTANEOUS at 21:31

## 2021-03-23 RX ADMIN — ACETAMINOPHEN 650 MG: 325 TABLET ORAL at 08:41

## 2021-03-23 RX ADMIN — ALLOPURINOL 300 MG: 300 TABLET ORAL at 08:42

## 2021-03-23 RX ADMIN — INSULIN LISPRO 1 UNITS: 100 INJECTION, SOLUTION INTRAVENOUS; SUBCUTANEOUS at 20:24

## 2021-03-23 RX ADMIN — ASPIRIN 81 MG: 81 TABLET, CHEWABLE ORAL at 08:41

## 2021-03-23 RX ADMIN — AMLODIPINE BESYLATE 7.5 MG: 2.5 TABLET ORAL at 08:41

## 2021-03-23 ASSESSMENT — PAIN DESCRIPTION - ORIENTATION: ORIENTATION: RIGHT

## 2021-03-23 ASSESSMENT — PAIN SCALES - GENERAL
PAINLEVEL_OUTOF10: 4
PAINLEVEL_OUTOF10: 5
PAINLEVEL_OUTOF10: 4

## 2021-03-23 ASSESSMENT — PAIN DESCRIPTION - DESCRIPTORS: DESCRIPTORS: CRAMPING;SHARP

## 2021-03-23 ASSESSMENT — PAIN DESCRIPTION - PAIN TYPE: TYPE: ACUTE PAIN;CHRONIC PAIN

## 2021-03-23 NOTE — PROGRESS NOTES
Cleo Reach  3/23/2021  [id number]    Chief Complaint: Acute ischemic stroke [facility])    Subjective:   No issues overnight; resting in bed. No c/o. ROS: no n/v, cp, sob, f/c  Objective:  Patient Vitals for the past 24 hrs:   BP Temp Temp src Pulse Resp SpO2   03/23/21 0830 (!) 151/78 97.9 °F (36.6 °C) Oral 78 16 97 %   03/22/21 2030 (!) 168/93 97.9 °F (36.6 °C) Oral 72 16 96 %     Gen: No distress, pleasant. HEENT: Normocephalic, atraumatic. CV: Regular rate and rhythm. Resp: No respiratory distress. Abd: Soft, nontender   Ext: No edema. Neuro: Alert, oriented, appropriately interactive. Wt Readings from Last 3 Encounters:   03/21/21 153 lb 14.1 oz (69.8 kg)   03/16/21 162 lb (73.5 kg)   07/14/20 158 lb 14.4 oz (72.1 kg)       Laboratory data:   Lab Results   Component Value Date    WBC 4.9 03/22/2021    HGB 13.9 03/22/2021    HCT 40.3 03/22/2021    MCV 81.8 03/22/2021     (L) 03/22/2021       Lab Results   Component Value Date     03/22/2021    K 3.5 03/22/2021    K 3.4 07/14/2020     03/22/2021    CO2 26 03/22/2021    BUN 28 03/22/2021    CREATININE 1.2 03/22/2021    GLUCOSE 163 03/22/2021    CALCIUM 10.8 03/22/2021        Therapy progress:  PT  Position Activity Restriction  Other position/activity restrictions: up with assist  Objective     Sit to Stand: Maximum Assistance  Stand to sit: Maximum Assistance  Bed to Chair: Maximum assistance  Device: Rolling Walker  Assistance: Maximum assistance  Distance: 8 feet  OT  PT Equipment Recommendations  Other: Continue to assess pending pt's progress. Toilet - Technique: Sit pivot  Equipment Used: Extra wide bedside commode  Toilet Transfers Comments: requires max assist in order to scoot back on commode  Assessment        SLP  Current Diet : Regular  Current Liquid Diet : Thin  Diet Solids Recommendation: Regular  Liquid Consistency Recommendation: Thin    Body mass index is 30.05 kg/m².     Assessment and Plan:  Acute ischemic infarct: ASA, statin.  PT/OT/SLP     Diabetes: Uncontrolled, Lantus 20, SSI; may increase lantus tomorrow     HTN: norvasc 5, propranolol 80; increased norvasc to 7.5mg today.      HLD: significantly elevated triglycerides, Lipitor 80     LEX: Push PO. Patient prefers no IVF     Bowel: Per protocol  Bladder: Per protocol  Sleep: Trazodone PRN  Pain: tylenol, tramadol  DVT PPx: lovenox  ELOS: 17-21       Home Granda MD 3/23/2021, 12:52 PM

## 2021-03-23 NOTE — PROGRESS NOTES
Occupational Therapy  Facility/Department: [facility] ARU  Daily Treatment Note  NAME: Elena Rosenthal  : 1950  MRN: [id number]    Date of Service: 3/23/2021    Discharge Recommendations:  Continue to assess pending progress, 24 hour supervision or assist  OT Equipment Recommendations  Other: CTA pending progress    Assessment   Performance deficits / Impairments: Decreased functional mobility ; Decreased endurance;Decreased coordination;Decreased ADL status; Decreased posture;Decreased ROM; Decreased balance;Decreased strength;Decreased safe awareness;Decreased high-level IADLs;Decreased cognition;Decreased vision/visual deficit; Decreased fine motor control  Assessment: Pt pleasant and cooperative, transfers fluctuate from mod-max A, pt with decreased safety and awareness of L UE with transfers. Pt with decreased proprioception seated EOB and with sitting on BSC. Pt with improving L UE AROM and control. increased ability to grasp and hold items. Seen for 30 min cotx with PT d/t complexity of condition, assist level and need for 2 skilled therapists to promote increased outcomes with standing and mobility. Pt able to take steps in // bars with mod A x 2 with OT supporting and advancing L UE and close wc follow, PT assisting wtih LE. Pt tolerates standing to RW with L clamshell, increased ability to grasp thsi date, assist to extend L UE thsi date. Cont POC. OT Education: OT Role;Plan of Care;Transfer Training;ADL Adaptive Strategies; Home Exercise Program  Patient Education: OT POC, disease specific, mobility, safety, fall prev  Activity Tolerance  Activity Tolerance: Patient Tolerated treatment well  Safety Devices  Safety Devices in place: Yes  Type of devices: Nurse notified; Left in bed;Bed alarm in place;Call light within reach         Patient Diagnosis(es): There were no encounter diagnoses. has a past medical history of Diabetes mellitus ([facility] [address] GERD (gastroesophageal reflux disease), and Hypertension. has no past surgical history on file. Restrictions  Restrictions/Precautions  Restrictions/Precautions: General Precautions, Fall Risk  Position Activity Restriction  Other position/activity restrictions: up with assist  Subjective   General  Chart Reviewed: Yes  Patient assessed for rehabilitation services?: Yes  Family / Caregiver Present: No  Referring Practitioner: Cathy Ramirez MD  Diagnosis: CVA with Left hemiparesis  Subjective  Subjective: pt supine, reports feeling better this date  General Comment  Comments: RN cleared pt for OT; pt resting in bed, agreeable to therapy  Pain Assessment  Pain Level: 4  Pain Type: Acute pain;Chronic pain  Pain Location: Back  Pain Orientation: Right  Pain Descriptors: Cramping; Sharp  Vital Signs  Pulse: 63  BP: 139/76  Patient Currently in Pain: Yes  Oxygen Therapy  SpO2: 97 %   Orientation  Orientation  Overall Orientation Status: Within Functional Limits  Objective    ADL  Grooming: Setup  LE Dressing: Maximum assistance(max A to thread LEs into pants)  Toileting: Dependent/Total(clothing mgmt)        Balance  Sitting Balance: Contact guard assistance  Standing Balance: Maximum assistance  Standing Balance  Time: 30 sec x multiple attempts, 1-2 min x multiple attempts  Activity: toilet transfers, standing in // bars and to RW with clamshell  Comment: L lateral lean in stance  Toilet Transfers  Toilet - Technique: Stand pivot  Equipment Used: Standard bedside commode  Toilet Transfer: Moderate assistance  Toilet Transfers Comments: requires max assist in order to scoot back on commode     Transfers  Stand Pivot Transfers: Moderate assistance;Maximum assistance  Sit Pivot Transfers: Moderate assistance;Maximum assistance  Sit to stand:  Moderate assistance  Stand to sit: Moderate assistance  Transfer Comments: fluctuated from mod to max A d/t fatigue        Coordination  Fine Motor: pt with improved ability to grasp small bean bags, able to open yellow clothespin with setup        Neuromuscular Education  Neuromuscular education: Yes  NDT Treatment: Upper extremity; Sitting;Standing  Weight Bearing  Weight Bearing Technique: Yes  LUE Weight Bearing: Forearm standing; Extended arm standing     Cognition  Overall Cognitive Status: Exceptions  Arousal/Alertness: Appropriate responses to stimuli  Following Commands: Follows one step commands consistently  Attention Span: Attends with cues to redirect  Memory: Appears intact  Problem Solving: Assistance required to identify errors made;Assistance required to correct errors made  Insights: Decreased awareness of deficits  Sequencing: Requires cues for some     Perception  Unilateral Attention: Cues to maintain midline in sitting;Cues to maintain midline in standing;Cues to attend to left side of body           Upper Extremity Function  NDT Treatment: Upper extremity; Sitting;Standing                       Plan   Plan  Times per week: 5-7 days/ wk  Times per day: Daily  Plan weeks: 2 weeks  Current Treatment Recommendations: Balance Training, Functional Mobility Training, Safety Education & Training, Positioning, Neuromuscular Re-education, Self-Care / ADL, Strengthening, Gait Training, Patient/Caregiver Education & Training, Stair training, ROM, Equipment Evaluation, Education, & procurement, Home Management Training, Endurance Training, Pain Management, Wheelchair Mobility Training, Cognitive/Perceptual Training, Cognitive Reorientation, Modalities (comment)    Goals  Short term goals  Time Frame for Short term goals: 1 week (3-26-21)  Short term goal 1: Pt will complete functional transfers mod A  Short term goal 2: Pt will complete UBD using bisi-techniques mod A  Short term goal 3: min assist with LUE self ROM/positioning  Short term goal 4: Pt will complete toileting mod A  Long term goals  Time Frame for Long term goals : 2 weeks (4-2-21)  Long term goal 1: Pt will complete functional transfers SBA  Long term goal 2: Pt will

## 2021-03-23 NOTE — PROGRESS NOTES
Speech Language Pathology  [facility]: ACUTE REHAB UNIT  SPEECH-LANGUAGE PATHOLOGY      [x] Daily  [] Weekly Care Conference Note  [] Discharge    [address]      [facility]:[birth year]  HOE:[id number]  Rehab Dx/Hx: Acute ischemic stroke ([facility] [address] [I63.9]    Precautions: falls  Home situation: lives independently, manages all household tasks, active    ST Dx: [] Aphasia  [x] Dysarthria  [] Apraxia   [] Oropharyngeal dysphagia [] Cognitive Impairment  [] Other:   Date of Admit: 3/19/2021  Room #: 4604/1849-96    Current functional status (updated daily):         Pt being seen for : [x] Speech/Language Treatment  [] Dysphagia Treatment [] Cognitive Treatment  [] Other:  Communication: []WFL  [] Aphasia  [x] Dysarthria  [] Apraxia  [] Pragmatic Impairment [] Non-verbal  [] Hearing Loss  [] Other:   Cognition: [x] WFL  [] Mild  [] Moderate  [] Severe [] Unable to Assess  [] Other:  Memory: [x] WFL  [] Mild  [] Moderate  [] Severe [] Unable to Assess  [] Other:  Behavior: [x] Alert  [x] Cooperative  [x]  Pleasant  [] Confused  [] Agitated  [] Uncooperative  [] Distractible [] Motivated  [] Self-Limiting [] Anxious  [] Other:  Endurance:  [x] Adequate for participation in SLP sessions  [] Reduced overall  [] Lethargic  [] Other:  Safety: [x] No concerns at this time  [] Reduced insight into deficits  []  Reduced safety awareness [] Not following call light procedures  [] Unable to Assess  [] Other:    Current Diet Order:DIET GENERAL; Carb Control: 4 carb choices (60 gms)/meal  Swallowing Precautions: Sit up for all meals and thereafter for 30 minutes, Eat with small bites (1/2 tsp; 1 tsp), Alternate solids with liquids and Take small sips of water at the end of snacks and meals        Date: 3/23/2021      Tx session 1  [id number]   Tx session 2  All tx needs met in session 1   Total Timed Code Min 0    Total Treatment Minutes 30    Individual Treatment Minutes 30    Group Treatment Minutes 0 0   Co-Treat Minutes 0 0   Variance/Reason:  0    Pain None reported    Pain Intervention [] RN notified  [] Repositioned  [] Intervention offered and patient declined  [x] N/A  [] Other: [] RN notified  [] Repositioned  [] Intervention offered and patient declined  [] N/A  [] Other:   Subjective     Pt sitting upright in bed, alert and oriented. Pt cooperative and agreeable to participate. Objective:  Goals     Short-term Goals  Timeframe for Short-term Goals: at least 5x/week for 10 days by 3/30/21    Goal 1: Patient will be able to demonstrate appropriate diaphragmatic breathing and completion of exercises for speech production with min cues. Pt completed diaphragmatic breathing exercises x10 independently. Goal 2: Patient will increase maximum phonation time to 12 seconds. Pt sustained /ah/ five times:  -6, 8, 7, 7, 6 for average of 6.8 seconds     Goal 3: Patient will demonstrate use of speech compensatory stategies with 90% accuracy   Simple tongue twisters: 100% accuracy    Complex tongue twisters: 80% accuracy, cues to slow down, pause breaks, and increased vocal volume       Goal 4: Patient will demonstrate adeqaute breath support with 95% accuracy for the  sentence level. See goal 3 above. *New goal added 03/22/21*  Goal 5. The patient will say single sentences as 65 dB SPL on average, with min cues. Pt averaged 62-64db SPL during conversation and structured speech tasks today. Other areas targeted: N/A    Education:   Edu provided re: use of speech strategies for improved intelligibility, diaphragmatic breathing exercises       Safety Devices: [x] Call light within reach  [] Chair alarm activated  [x] Bed alarm activated  [] Other: [] Call light within reach  [] Chair alarm activated  [] Bed alarm activated  [] Other:    Assessment: Pt pleasant and cooperative, agreeable to participate.  Pt completed simple tongue twisters today with 100% accuracy, and complex tongue twisters with 80% accuracy improving to 100% accuracy given cues for slow rate, pause breaks, increased vocal volume. Pt demonstrating with an average of 62-64dB SPL during conversation and within structured speech tasks. Pt averaged 6.8 seconds MPT. Pt is strongly motivated to improve, continue goals as listed above. Plan: Continue as per plan of care. Additional Information:     Barriers toward progress: None   Discharge recommendations:  [] Home independently  [] Home with assistance []  24 hour supervision  [] ECF [] Other:  Continued Tx Upon Discharge: ? [] Yes [] No [] TBD based on progress while on ARU [] Vital Stim indicated [] Other:   Estimated discharge date: TBD  Interventions used this date:  [] Speech/Language Treatment  [] Instruction in HEP [] Group [] Dysphagia Treatment [] Cognitive Treatment   [] Other: Total Time Breakdown / Charges    Time in Time out Total Time / units   Cognitive Tx -- -- --   Speech Tx 0800 0830 30 min/ 1 unit   Dysphagia Tx -- -- --       Electronically Signed by     Isaias Carrillo. A CCC-SLP  Speech-Language Pathologist  [id number]

## 2021-03-23 NOTE — PROGRESS NOTES
treatment well;Patient limited by pain  Activity Tolerance: /76, HR 63, SpO2 96% on RA     Patient Diagnosis(es): There were no encounter diagnoses. has a past medical history of Diabetes mellitus ([address] GERD (gastroesophageal reflux disease), and Hypertension. has no past surgical history on file. Restrictions  Restrictions/Precautions  Restrictions/Precautions: General Precautions, Fall Risk  Position Activity Restriction  Other position/activity restrictions: up with assist  Subjective   General  Chart Reviewed: Yes  Response To Previous Treatment: Patient with no complaints from previous session. Family / Caregiver Present: No  Referring Practitioner: Sheila Saez MD  Subjective  Subjective: Pt supine in bed on approach, pleasant and agreeable to PT tx  Pain Screening  Patient Currently in Pain: Yes  Pain Assessment  Pain Assessment: 0-10  Pain Level: 4  Pain Type: Acute pain;Chronic pain  Pain Location: Back  Pain Orientation: Right  Pain Descriptors: Sharp;Cramping  Vital Signs  Patient Currently in Pain: Yes       Orientation  Orientation  Overall Orientation Status: Within Normal Limits    Objective   Bed mobility  Supine to Sit: Minimal assistance(HOB elevated, use of BR, increased time to complete and cues for sequence)  Sit to Supine: Unable to assess(pt up in chair with OT at end of session)     Transfers  Sit to Stand: Moderate Assistance  Stand to sit: Moderate Assistance  Bed to Chair: Moderate assistance  Comment: SPT EOB to w/c modA x 1, sit to/from stand w/c to // bars modA completed x 4 reps, sit to/from stand w/c to RW with L clamshells modA x 2 completed x 2 reps. Cues for hand placement. PT blocking L knee and OT assisting with LUE placement     Ambulation  Ambulation?: Yes  More Ambulation?: No  Ambulation 1  Surface: level tile  Device: Parallel Bars  Other Apparatus: Wheelchair follow;Dorsiflex Assist(DF assist ace wrap LLE)  Assistance:  Moderate assistance;2 Person assistance  Quality of Gait: Step to pattern, ataxic gait LLE with decreased coordination and motor planning, L decreased WS and decreased stance, L ankle inversion B decreased toe clearance B decreased heel strike, L knee into genu recurvatuum unless corrected, forward flexed trunk. Pt moderately unsteady, modA x 2 and close w/c follow for safety. Gait Deviations: Slow Lisa;Decreased step length;Decreased step height;Shuffles;Staggers;Decreased head and trunk rotation  Distance: 8' in // bars x 2  Comments: PT anterior to pt assisting with LLE positioning, providing tactile cues at quad to facilitate motor contraction, prevention of LLE genu recurvatuum with blocking to L knee to prevent buckling as needed (decreased buckling noted this date), cues for improved WS. OT assisting with WS and assisting with LUE management. Stairs/Curb  Stairs?: No(deferred d/t safety)     Balance  Pt completes x 2 bout static stand with RW with LUE clamshells with Piotr to modA x 2 with cues for upright posture and increasing WB to LUE/LLE, limited by fatigue/pain. Pt maintains x 1-2 minutes each bout with seated rest break between. Addendum: 2nd session:  Pt supine in bed on approach, pleasant and agreeable to PT tx, reports fatigue and LBP 3-4/10 but demonstrates good participation during session. Pt requesting to limit gait/standing d/t fatigue. Bed mobility:  Supine to sit Piotr at trunk with HOB elevated, use of BR, increased time to complete  Sit to supine Piotr for BLE with use of BR  ModA to scoot to [facility] with use of bridge and BUE  Transfers:  SPT EOB to/from w/c no AD modA  SPT w/c to/from SCIFIT with RW maxA   Sit to/from stand w/c no AD modA completed x 3 reps.   PT intermittently blocking L knee, decreased buckling noted  Balance:  Pt completes x 3 bout x 2 minute each dynamic standing balance without AD, PT anterior to pt on stool with pt RUE support on PT shoulder, variable Piotr to maxA for LRAD and independence. Long term goal 4: Pt will ambulate 100 feet with LRAD and independence. Long term goal 5: Pt will ascend/descend 4 steps with bilateral rails and CGA. Patient Goals   Patient goals : To get stronger and go home. Plan    Plan  Times per week: 5 out of 7  Times per day: Daily  Plan weeks: 2 weeks, by 4/2/21  Current Treatment Recommendations: Strengthening, Neuromuscular Re-education, Home Exercise Program, ROM, Balance Training, Endurance Training, Functional Mobility Training, Transfer Training, Gait Training, Stair training, Wheelchair Mobility Training, Safety Education & Training, Patient/Caregiver Education & Training, Equipment Evaluation, Education, & procurement  Safety Devices  Type of devices:  All fall risk precautions in place, Nurse notified, Gait belt, Patient at risk for falls, Left in chair, Call light within reach(pt seated in w/c with OT at end of session)     Therapy Time   Individual Concurrent Group Co-treatment   Time In       1030   Time Out       1100   Minutes       30   Timed Code Treatment Minutes: 30 Minutes     Second Session Therapy Time:   Individual Concurrent Group Co-treatment   Time In 1230        Time Out 1330        Minutes 60          Timed Code Treatment Minutes:  60 minutes    Total Treatment Minutes:  90 minutes    Kennedi Imus, PT, DPT

## 2021-03-23 NOTE — PATIENT CARE CONFERENCE
[facility]  Inpatient Rehabilitation  Weekly Team Conference Note    Date: 3/24/2021  Patient Name: Angelica Gonzalez        MRN: [id number]    : 1950  (74 y.o.)  Gender: female   Referring Practitioner: Christina Leroy MD  Diagnosis: acute left sided weakness      Interventions to be utilized toward barriers to discharge, per discipline:  [address] observed barriers to dc: Pain, Decreased endurance, Upper extremity weakness and Lower extremity weakness  Nursing interventions:assist with ADL's, toileting and medication management  Family Education: no  Fall Risk:  Yes      Physical therapy observed barriers to dc:    Baseline: Lives with spouse, one level home, ramped entrance, I without AD   Current level: Piotr bed mobility, modA t/f, modA x 2 gait in // bars, Piotr to maxA standing balance   Barriers to DC: L bisi, decreased coordination/motor planning, decreased strength, decreased activity tolerance, LBP, current BARON   Needs in order to achieve dc home/next level of care: Likely needs to be CGA to MI with functional mobility and gait vs w/c, has ramped entrance      Physical therapy interventions:   Current Treatment Recommendations: Strengthening, Neuromuscular Re-education, Home Exercise Program, ROM, Balance Training, Endurance Training, Functional Mobility Training, Transfer Training, Gait Training, Stair training, Wheelchair Mobility Training, Safety Education & Training, Patient/Caregiver Education & Training, Equipment Evaluation, Education, & procurement      PHYSICAL THERAPY  PT Equipment Recommendations  Other: Continue to assess pending pt's progress. Assessment: Pt seen for 30 minute co-tx with OT d/c complex medical condition, decreased activity tolerance, decreased coordination and motor planning L and BARON for mobility.  Pt benefits from x 2 skilled hands to provide increased cues and feedback for facilitation of improved performance and safety with funcitonal mobility and gait. Pt demonstrates good progress with mobility and gait this session, improved control to LLE and LUE and improved balance. Pt demonstrates bed mobility with Piotr, t/f with modA and gait with modA x 2 in // bars. Pt is limited by R sided back pain, decreased strength, decreased balance and  L sided deficits. Pt will benefit from continued skilled PT in ARU to address above deficits, will continue to progress mobility as tolerated. Occupational therapy observed barriers to dc:    Baseline: lived with , independent with all ADLs, transfers, mobility    Current level:max A for UB/LB ADLs, transfers mod/max A   Barriers to DC: assist level   Needs in order to achieve dc home/next level of care: min A transfers and ADLs    Occupational Therapy interventions:  Current Treatment Recommendations: Balance Training, Functional Mobility Training, Safety Education & Training, Positioning, Neuromuscular Re-education, Self-Care / ADL, Strengthening, Gait Training, Patient/Caregiver Education & Training, Stair training, ROM, Equipment Evaluation, Education, & procurement, Home Management Training, Endurance Training, Pain Management, Wheelchair Mobility Training, Cognitive/Perceptual Training, Cognitive Reorientation, Modalities (comment)      OCCUPATIONAL THERAPY  Assessment: Pt pleasant and cooperative, transfers fluctuate from mod-max A, pt with decreased safety and awareness of L UE with transfers. Pt with decreased proprioception seated EOB and with sitting on BSC. Pt with improving L UE AROM and control. increased ability to grasp and hold items. Seen for 30 min cotx with PT d/t complexity of condition, assist level and need for 2 skilled therapists to promote increased outcomes with standing and mobility. Pt able to take steps in // bars with mod A x 2 with OT supporting and advancing L UE and close wc follow, PT assisting wtih LE.  Pt tolerates standing to RW with L clamshell, increased ability to grasp thsi date, assist to extend L UE thsi date. Cont POC. Speech therapy observed barriers to dc:    Baseline: pt lives at home with , retired, active , manages own meds   Current level: mild dysarthria, WFL cog   Barriers to DC: None    Needs in order to achieve dc home/next level of care: carryover of speech strategies for improved intelligibility, improved vocal volume and breath support     Speech Therapy interventions:  Dysphagia: N/A  Speech/Language/Cognition: speech strategies for improved intelligibility, breath support exercises, vocal strategies and exercises       SPEECH THERAPY:  Pt pleasant and cooperative, agreeable to participate. Pt completed simple tongue twisters today with 100% accuracy, and complex tongue twisters with 80% accuracy improving to 100% accuracy given cues for slow rate, pause breaks, increased vocal volume. Pt demonstrating with an average of 62-64dB SPL during conversation and within structured speech tasks. Pt averaged 6.8 seconds MPT. Pt is strongly motivated to improve       NUTRITION  Weight: 153 lb 14.1 oz (69.8 kg) / Body mass index is 30.05 kg/m². Diet Order: DIET GENERAL; Carb Control: 4 carb choices (60 gms)/meal  PO Meals Eaten (%): 76 - 100%  Education: Completed      CASE MANAGEMENT  Assessment: Pt goal is to return home at time of DC. Pt is open to services needed. Pt has a ramp and a one level home. Pt lives with her spouse        Interdisciplinary Goals:   1.)Pt will complete functional transfers mod A  2.) Pt will complete carryover use of speech strategies for improved intelligibility independently   3.) pt will complete t/f bed <> chair with LRAD with Piotr x 1      Discharge Plan   Estimated discharge date: 4/10/2021  Destination: home health  Pass:No  Services at Discharge: [address], Occupational Therapy, Speech Therapy and [address] at Discharge: DME TBD pending progress.      Team Members Present at

## 2021-03-24 LAB
ANION GAP SERPL CALCULATED.3IONS-SCNC: 9 MMOL/L (ref 3–16)
BUN BLDV-MCNC: 25 MG/DL (ref 7–20)
CALCIUM SERPL-MCNC: 10.7 MG/DL (ref 8.3–10.6)
CHLORIDE BLD-SCNC: 106 MMOL/L (ref 99–110)
CO2: 27 MMOL/L (ref 21–32)
CREAT SERPL-MCNC: 1 MG/DL (ref 0.6–1.2)
GFR AFRICAN AMERICAN: >60
GFR NON-AFRICAN AMERICAN: 55
GLUCOSE BLD-MCNC: 135 MG/DL (ref 70–99)
GLUCOSE BLD-MCNC: 142 MG/DL (ref 70–99)
GLUCOSE BLD-MCNC: 146 MG/DL (ref 70–99)
GLUCOSE BLD-MCNC: 152 MG/DL (ref 70–99)
GLUCOSE BLD-MCNC: 173 MG/DL (ref 70–99)
HCT VFR BLD CALC: 38.9 % (ref 36–48)
HEMOGLOBIN: 13.4 G/DL (ref 12–16)
MCH RBC QN AUTO: 28.3 PG (ref 26–34)
MCHC RBC AUTO-ENTMCNC: 34.5 G/DL (ref 31–36)
MCV RBC AUTO: 82.1 FL (ref 80–100)
PDW BLD-RTO: 14.3 % (ref 12.4–15.4)
PERFORMED ON: ABNORMAL
PLATELET # BLD: 115 K/UL (ref 135–450)
PMV BLD AUTO: 7.4 FL (ref 5–10.5)
POTASSIUM SERPL-SCNC: 3.4 MMOL/L (ref 3.5–5.1)
RBC # BLD: 4.73 M/UL (ref 4–5.2)
SODIUM BLD-SCNC: 142 MMOL/L (ref 136–145)
WBC # BLD: 4.4 K/UL (ref 4–11)

## 2021-03-24 PROCEDURE — 97535 SELF CARE MNGMENT TRAINING: CPT

## 2021-03-24 PROCEDURE — 97530 THERAPEUTIC ACTIVITIES: CPT

## 2021-03-24 PROCEDURE — 92507 TX SP LANG VOICE COMM INDIV: CPT

## 2021-03-24 PROCEDURE — 80048 BASIC METABOLIC PNL TOTAL CA: CPT

## 2021-03-24 PROCEDURE — 1280000000 HC REHAB R&B

## 2021-03-24 PROCEDURE — 85027 COMPLETE CBC AUTOMATED: CPT

## 2021-03-24 PROCEDURE — 97110 THERAPEUTIC EXERCISES: CPT

## 2021-03-24 PROCEDURE — 6370000000 HC RX 637 (ALT 250 FOR IP): Performed by: PHYSICAL MEDICINE & REHABILITATION

## 2021-03-24 PROCEDURE — 6360000002 HC RX W HCPCS: Performed by: PHYSICAL MEDICINE & REHABILITATION

## 2021-03-24 PROCEDURE — 97116 GAIT TRAINING THERAPY: CPT

## 2021-03-24 RX ORDER — AMLODIPINE BESYLATE 5 MG/1
10 TABLET ORAL DAILY
Status: DISCONTINUED | OUTPATIENT
Start: 2021-03-24 | End: 2021-04-10 | Stop reason: HOSPADM

## 2021-03-24 RX ADMIN — CETIRIZINE HYDROCHLORIDE 10 MG: 10 TABLET, FILM COATED ORAL at 09:00

## 2021-03-24 RX ADMIN — INSULIN LISPRO 2 UNITS: 100 INJECTION, SOLUTION INTRAVENOUS; SUBCUTANEOUS at 12:12

## 2021-03-24 RX ADMIN — ALLOPURINOL 300 MG: 300 TABLET ORAL at 09:00

## 2021-03-24 RX ADMIN — Medication 5 MG: at 21:41

## 2021-03-24 RX ADMIN — ENOXAPARIN SODIUM 40 MG: 40 INJECTION SUBCUTANEOUS at 08:59

## 2021-03-24 RX ADMIN — AMLODIPINE BESYLATE 10 MG: 5 TABLET ORAL at 09:00

## 2021-03-24 RX ADMIN — INSULIN LISPRO 2 UNITS: 100 INJECTION, SOLUTION INTRAVENOUS; SUBCUTANEOUS at 07:21

## 2021-03-24 RX ADMIN — INSULIN LISPRO 2 UNITS: 100 INJECTION, SOLUTION INTRAVENOUS; SUBCUTANEOUS at 16:49

## 2021-03-24 RX ADMIN — ATORVASTATIN CALCIUM 80 MG: 80 TABLET, FILM COATED ORAL at 21:41

## 2021-03-24 RX ADMIN — PROPRANOLOL HYDROCHLORIDE 80 MG: 40 TABLET ORAL at 09:00

## 2021-03-24 RX ADMIN — ASPIRIN 81 MG: 81 TABLET, CHEWABLE ORAL at 09:00

## 2021-03-24 RX ADMIN — TRAMADOL HYDROCHLORIDE 50 MG: 50 TABLET, FILM COATED ORAL at 09:00

## 2021-03-24 ASSESSMENT — PAIN SCALES - GENERAL
PAINLEVEL_OUTOF10: 3
PAINLEVEL_OUTOF10: 0

## 2021-03-24 ASSESSMENT — PAIN DESCRIPTION - LOCATION
LOCATION: BACK
LOCATION: BACK

## 2021-03-24 ASSESSMENT — PAIN DESCRIPTION - PAIN TYPE
TYPE: CHRONIC PAIN
TYPE: CHRONIC PAIN

## 2021-03-24 ASSESSMENT — PAIN DESCRIPTION - ORIENTATION: ORIENTATION: RIGHT

## 2021-03-24 NOTE — PLAN OF CARE
Pt reminded to ask for assistance when needing to get up. Pt turned to relieve pressure. Pt with no acute distress noted call light in reach.   Problem: Skin Integrity:  Goal: Absence of new skin breakdown  Description: Absence of new skin breakdown  Outcome: Ongoing     Problem: Falls - Risk of:  Goal: Will remain free from falls  Description: Will remain free from falls  Outcome: Ongoing

## 2021-03-24 NOTE — PROGRESS NOTES
Occupational Therapy  Facility/Department: [address] [place] [address]  Daily Treatment Note  NAME: Bess Singh  : 1950  MRN: [id number]    Date of Service: 3/24/2021    Discharge Recommendations:  Continue to assess pending progress, 24 hour supervision or assist  OT Equipment Recommendations  Other: CTA pending progress    Assessment   Performance deficits / Impairments: Decreased functional mobility ; Decreased endurance;Decreased coordination;Decreased ADL status; Decreased posture;Decreased ROM; Decreased balance;Decreased strength;Decreased safe awareness;Decreased high-level IADLs;Decreased cognition;Decreased vision/visual deficit; Decreased fine motor control  Assessment: Pt pleasant and cooperative, seen for 30 min cotx with PT d/t assist level, complexity of condition, L sided weakness and cotx to promote increased outcomes to address transfers and mobility. Pt cont to require mod-max A x 2 for mobility with RW and B HHA, cues for weight shift and tech, both OT and PT assisting with balance,close w/c follow. Second session: pt requesting to \"clean up\", declines shower and requests to bathe at sink. Pt requiring less assist overall for ADL tasks this date, able to utilize L UE for dressing and bathing tasks with cues this date. Pt demos increased AROm and control of L UE, however L UE cont to fatigue easily. Pt able to  1\" blocks and transport to another surface, dropping ~25% time. Pt progressing well, cont POC. OT Education: OT Role;Plan of Care;Transfer Training;ADL Adaptive Strategies; Home Exercise Program  Patient Education: OT POC, disease specific, mobility, safety, fall prev  Activity Tolerance  Activity Tolerance: Patient Tolerated treatment well  Activity Tolerance: /62 HR 65  Safety Devices  Safety Devices in place: Yes  Type of devices: Bed alarm in place;Call light within reach; Chair alarm in place; Left in chair;Gait belt         Patient Diagnosis(es): There were no encounter diagnoses. has a past medical history of Diabetes mellitus ([facility] [address] GERD (gastroesophageal reflux disease), and Hypertension. has no past surgical history on file. Restrictions  Restrictions/Precautions  Restrictions/Precautions: General Precautions, Fall Risk  Position Activity Restriction  Other position/activity restrictions: up with assist  Subjective   General  Chart Reviewed: Yes  Patient assessed for rehabilitation services?: Yes  Family / Caregiver Present: No  Referring Practitioner: Louisa Quintanilla MD  Diagnosis: CVA with Left hemiparesis  Subjective  Subjective: pt supine, reports feeling better this date  General Comment  Comments: RN cleared pt for OT; pt resting in bed, agreeable to therapy  Pain Assessment  Pain Level: 3  Pain Type: Chronic pain  Pain Location: Back  Pain Orientation: Right  Pain Descriptors: Cramping; Sharp  Vital Signs  Patient Currently in Pain: Yes   Orientation  Orientation  Overall Orientation Status: Within Functional Limits  Objective    ADL  Grooming: Setup(oral care, washing face/hands)  UE Bathing: Minimal assistance  LE Bathing: Moderate assistance  UE Dressing: Moderate assistance  LE Dressing: Maximum assistance(assist to cross L LE to thread brief and pants and pull up, assist to thanh shoes)  Toileting: Maximum assistance(clothing mgmt)  Additional Comments: pt requiring less assist for ADLs at times        Balance  Sitting Balance: Contact guard assistance  Standing Balance: Moderate assistance  Standing Balance  Time: 30 sec x multiple attempts, 1-2 min x multiple attempts, 2-3 min x 4  Activity: transfers, standing to , HHA for mobility, ADL and toileting tasks  Comment: L lateral lean in stance when fatigued  Toilet Transfers  Toilet - Technique: Stand pivot  Equipment Used: Standard bedside commode(BSC frame over toilet)  Toilet Transfer: Moderate assistance     Transfers  Stand Pivot Transfers: Moderate assistance;Maximum assistance  Sit to stand:  Moderate assistance Stand to sit: Moderate assistance  Transfer Comments: fluctuated from mod to max A d/t fatigue, able to stand to grab bar with min A and cues for tech        Coordination  Gross Motor: pt with improved control of L UE for all tasks, but fatigues easily  Fine Motor: pt able to  small blocks with cues x 10 this date, some with pincer grasps        Neuromuscular Education  Neuromuscular education: Yes  Weight Bearing  Weight Bearing Technique: Yes  LUE Weight Bearing: Forearm standing; Extended arm standing     Cognition  Overall Cognitive Status: Exceptions  Arousal/Alertness: Appropriate responses to stimuli  Following Commands:  Follows one step commands consistently  Attention Span: Attends with cues to redirect  Memory: Appears intact  Problem Solving: Assistance required to identify errors made;Assistance required to correct errors made  Insights: Decreased awareness of deficits  Sequencing: Requires cues for some  Cognition Comment: pt mildly impulsive some cues for safety     Perception  Overall Perceptual Status: WFL  Unilateral Attention: Cues to maintain midline in sitting;Cues to maintain midline in standing;Cues to attend to left side of body        Plan   Plan  Times per week: 5-7 days/ wk  Times per day: Daily  Plan weeks: 2 weeks  Current Treatment Recommendations: Balance Training, Functional Mobility Training, Safety Education & Training, Positioning, Neuromuscular Re-education, Self-Care / ADL, Strengthening, Gait Training, Patient/Caregiver Education & Training, Stair training, ROM, Equipment Evaluation, Education, & procurement, Home Management Training, Endurance Training, Pain Management, Wheelchair Mobility Training, Cognitive/Perceptual Training, Cognitive Reorientation, Modalities (comment)    Goals  Short term goals  Time Frame for Short term goals: 1 week (3-26-21)  Short term goal 1: Pt will complete functional transfers mod A  Short term goal 2: Pt will complete UBD using bisi-techniques mod A  Short term goal 3: min assist with LUE self ROM/positioning  Short term goal 4: Pt will complete toileting mod A  Long term goals  Time Frame for Long term goals : 2 weeks (4-2-21)  Long term goal 1: Pt will complete functional transfers SBA  Long term goal 2: Pt will complete UBD using bisi-techniques SBA  Long term goal 3: Pt will complete toileting CGA  Patient Goals   Patient goals : \"get stronger so I can get back to my normal routines\"       Therapy Time   Individual Concurrent Group Co-treatment   Time In 1000     0800   Time Out 1100     0830   Minutes 60     30   Timed Code Treatment Minutes: Terrell Calderón OT

## 2021-03-24 NOTE — PROGRESS NOTES
requires max assist in order to scoot back on commode  Assessment        SLP  Current Diet : Regular  Current Liquid Diet : Thin  Diet Solids Recommendation: Regular  Liquid Consistency Recommendation: Thin    Body mass index is 30.05 kg/m². Assessment and Plan:  Acute ischemic infarct: ASA, statin.  PT/OT/SLP     Diabetes: Uncontrolled, Lantus 20, SSI; may increase lantus tomorrow     HTN: propranolol 80; increase norvasc to 10     HLD: significantly elevated triglycerides, Lipitor 80     LEX: Push PO. Patient prefers no IVF     Bowel: Per protocol  Bladder: Per protocol  Sleep: Trazodone PRN  Pain: tylenol, tramadol  DVT PPx: lovenox  CECLIY: 4/10 home w/ home health  DME: TBD    Interdisciplinary team conference was held today with entire rehab treatment team including PT, OT, SLP, Dietician, RN, and SW. Discussion focused on progress toward rehab goals and discharge planning. Barriers: weakness, endurance. Total treatment time >35 min with greater than 50% spent in care coordination.        Home Yang MD 3/24/2021, 8:13 AM

## 2021-03-24 NOTE — CARE COORDINATION
Writer met with patient to discuss disposition of care conference with interdisciplinary team on    3/24/21           Discussed:  Pt progress with therapy. Pt reported that she feels that she can meet her goals by DC date. SW asked if SW could update family. Pt said she would call them and update them    Recommendations: home with Etelvina Sebastian    Anticipated discharge date: 4/10    Patient verbalized understanding of recommendations and anticipated discharge date.    CRISTIAN Madison

## 2021-03-24 NOTE — PROGRESS NOTES
Physical Therapy  Facility[facility]Department[facility]  Daily Treatment Note  NAME: Tarik Diggs  : 1950  MRN: [id number]    Date of Service: 3/24/2021    Discharge Recommendations:  24 hour supervision or assist, Home with Home health PT   PT Equipment Recommendations  Other: Continue to assess pending pt's progress. Assessment   Body structures, Functions, Activity limitations: Decreased functional mobility ; Decreased endurance;Decreased ROM; Decreased balance;Decreased posture;Decreased coordination;Decreased strength;Decreased fine motor control; Increased pain  Assessment: Pt seen for co-tx session initially with pt demonstrating improved balance and coordination. Focused on ambulation with RW for multiple bouts with w/c follow requiring cueing from OT for posture and left UE management, and PT for increasing step size and weight shifting. Pt tolerated well with slight improvement with bilateral HHA x2. Pt then completed sit<>stands, standing balance, and stepping with the right and left in the // bars with Ax1. Pt completed w/c mobility with BLE, then right UE/LE, attempted to educated pt on BUE mobility, but pt unable to grasp wheel frame consistently. Final session focused on transfers and ther ex supine/sitting d/t fatigue. Pt required Mod A<>Max for all transfers for the final session d/t increased fatigue and weakness. Pt tolerated exercises well with a focus on controlling the left LE. Pt will benefit from continued skilled PT to increase independence with a focus on balance and strengthening/increasing coordination on the left side. Continue ARU POC. Treatment Diagnosis: impaired mobility  Prognosis: Good  PT Education: Goals;Gait Training;PT Role;Disease Specific Education;Plan of Care; Functional Mobility Training;Transfer Training;Home Exercise Program;Precautions;General Safety;Weight-bearing Education  Patient Education: Educated on upright posture, TKE for weightbearing in BLE.  Pt verbalized understanding, but will benefit from additional education. REQUIRES PT FOLLOW UP: Yes  Activity Tolerance  Activity Tolerance: Patient Tolerated treatment well;Patient limited by pain  Activity Tolerance: KB=397/62, HR=71bpm, SpO2=97%     Patient Diagnosis(es): There were no encounter diagnoses. has a past medical history of Diabetes mellitus ([facility] [address] GERD (gastroesophageal reflux disease), and Hypertension. has no past surgical history on file. Restrictions  Restrictions/Precautions  Restrictions/Precautions: General Precautions, Fall Risk  Position Activity Restriction  Other position/activity restrictions: up with assist  Subjective   General  Chart Reviewed: Yes  Response To Previous Treatment: Patient with no complaints from previous session. Family / Caregiver Present: No  Referring Practitioner: Oc Thayer MD  Subjective  Subjective: Pt supine in bed on approach, pleasant and agreeable to PT tx. Pt feeling well this AM  General Comment  Comments: Pt resting in bed upon entry, RN cleared pt for therapy  Pain Screening  Patient Currently in Pain: Yes  Pain Assessment  Pain Assessment: 0-10  Pain Level: 3  Pain Type: Chronic pain  Pain Location: Back  Pain Orientation: Right  Pain Descriptors: Cramping; Sharp  Non-Pharmaceutical Pain Intervention(s): Ambulation/Increased Activity;Repositioned; Therapeutic presence; Therapeutic touch  Vital Signs  Patient Currently in Pain: Yes       Orientation  Orientation  Overall Orientation Status: Within Normal Limits  Cognition   Cognition  Overall Cognitive Status: Exceptions  Arousal/Alertness: Appropriate responses to stimuli  Following Commands:  Follows one step commands consistently  Attention Span: Attends with cues to redirect  Memory: Appears intact  Problem Solving: Assistance required to identify errors made;Assistance required to correct errors made  Insights: Decreased awareness of deficits  Sequencing: Requires cues for some  Cognition Comment: pt mildly impulsive some cues for safety  Objective   Bed mobility  Supine to Sit: Minimal assistance(bed rails used, slow to complete, HOB flat)  Sit to Supine: Minimal assistance(bed rails used, slow to complete, HOB flat)  Comment: Min A for bed mobility with HR and extended time to complete, Min A for trunk control or BLE management. Transfers  Sit to Stand: Moderate Assistance;Minimal Assistance  Stand to sit: Moderate Assistance;Minimal Assistance  Bed to Chair: Moderate assistance  Comment: Min A first session for sit<>stand d/t improved balance and coordination, 2nd session grossly Mod A d/t fatigue and poor balance. To RW or // bars for support, cues for positioning and sequencing with attention to the left hand. Ambulation  Ambulation?: Yes  More Ambulation?: No  Ambulation 1  Surface: level tile  Device: Rolling Walker  Other Apparatus: Wheelchair follow  Assistance: Moderate assistance;2 Person assistance  Quality of Gait: Step to gait pattern, leading with the left LE, ataxic gait with decreased coordination and motor planning. Decreased stance time on the left and ankle inversion at times, but improved this session. Cues to pause and correct sequencing and posture frequently. Gait Deviations: Slow Lisa;Decreased step length;Decreased step height;Shuffles;Staggers;Decreased head and trunk rotation  Distance: 30 feet x5, 3 feet x3  Ambulation 2  Surface - 2: level tile  Device 2: (bilateral HHA)  Other Apparatus 2: Wheelchair follow  Assistance 2: Moderate assistance;2 Person assistance  Quality of Gait 2: Pt using BUE for support with ambulation and improved WB through BLE. Pt given cues for increasing step length, upright posture, and widening ROYCE.   Gait Deviations: Slow Lisa;Decreased step length;Shuffles;Decreased step height  Distance: 50 feet + 50 feet  Stairs/Curb  Stairs?: No(deferred d/t safety.)  Wheelchair Activities  Wheelchair Parts Management: Yes  Left Brakes Level of Assistance: Moderate assistance  Right Brakes Level of Assistance: Minimal assistance  Propulsion: Yes  Propulsion 1  Propulsion: Manual  Level: Level Tile  Method: RLE;LLE;RUE  Level of Assistance: Minimal assistance; Moderate assistance  Description/ Details: Completed with only BLE to propel, then only right UE/LE; attempted to educate with left UE, but pt unable to grasp wheel for propulsion with poor insight into left hand position. Extended time required to complete. Used w/c at end of second session for transfers d/t fatigued and poor standing balance. Distance: 200 feet, 10 feet     Balance  Posture: Fair  Sitting - Static: Fair;+  Sitting - Dynamic: Fair  Standing - Static: Fair;-  Standing - Dynamic: Poor;+  Comments: static standing in // bars with mirror for midline positioning, TKE on the right and left for quad control in standing in // bars, stepping strategy with target for right and left LE and stepping backward to feel parallel in // bars. Total time of ~13 minutes. Exercises  Quad Sets: x10 BLE  Heelslides: x10 BLE  Hip Abduction: x10 BLE supine  Knee Long Arc Quad: x10 BLE  Ankle Pumps: x10 BLE  Comments: AROM on BLE, focus on control for the left LE with partial ROM at times; cues for all sequencing and technique. Goals  Short term goals  Time Frame for Short term goals: 1 week, 3/26/21  Short term goal 1: Pt will complete bed mobility with SBA and no rails. 3/24: Min A bed mobility with rails  Short term goal 2: Pt will complete sit<>stand with Min A with LRAD. 3/24: Min<>Mod A for sit<>stands. Short term goal 3: Pt will ambulate 100 feet with LRAD and Mod Ax1. Short term goal 4: Pt will complete bed<>chair transfer with LRAD and Min A.   Short term goal 5: Pt will complete 150 feet of w/c mobility with multiple turns and IND. 3/24: 200 feet with Min A<>Mod A  Long term goals  Time Frame for Long term goals : 2 weeks, by 4/2/21  Long term goal 1: Pt will complete bed mobility with independence in flat bed without rails. Long term goal 2: Pt will complete sit<>stands with independence and LRAD. Long term goal 3: Pt will complete bed<>Chair transfer with LRAD and independence. Long term goal 4: Pt will ambulate 100 feet with LRAD and independence. Long term goal 5: Pt will ascend/descend 4 steps with bilateral rails and CGA. Patient Goals   Patient goals : To get stronger and go home. Plan    Plan  Times per week: 5 out of 7  Times per day: Daily  Plan weeks: 2 weeks, by 4/2/21  Current Treatment Recommendations: Strengthening, Neuromuscular Re-education, Home Exercise Program, ROM, Balance Training, Endurance Training, Functional Mobility Training, Transfer Training, Gait Training, Stair training, Wheelchair Mobility Training, Safety Education & Training, Patient/Caregiver Education & Training, Equipment Evaluation, Education, & procurement  Safety Devices  Type of devices:  All fall risk precautions in place, Nurse notified, Gait belt, Patient at risk for falls, Call light within reach, Left in bed(For 2nd session, first session-pt up in chair with alarm set.)     Therapy Time   Individual Concurrent Group Co-treatment   Time In 0830     0800   Time Out 0900     0830   Minutes 30     30   Timed Code Treatment Minutes: [address] Time:   Individual Concurrent Group Co-treatment   Time In 1330        Time Out 1400        Minutes 30        Timed Code Treatment Minutes:  30 Minutes    Total Treatment Minutes: [address] [facility], PT

## 2021-03-24 NOTE — PROGRESS NOTES
0   Variance/Reason:  0    Pain None reported    Pain Intervention [] RN notified  [] Repositioned  [] Intervention offered and patient declined  [x] N/A  [] Other: [] RN notified  [] Repositioned  [] Intervention offered and patient declined  [] N/A  [] Other:   Subjective     Pt sitting upright in bedside chair, alert and oriented. Pt cooperative and agreeable to participate. Objective:  Goals     Short-term Goals  Timeframe for Short-term Goals: at least 5x/week for 10 days by 3/30/21    Goal 1: Patient will be able to demonstrate appropriate diaphragmatic breathing and completion of exercises for speech production with min cues. Pt completed diaphragmatic breathing exercises x10 independently. Goal 2: Patient will increase maximum phonation time to 12 seconds. Pt sustained /ah/ five times:  -7.3, 9.4, 10.2, 11.6, 12.3= for an average of 10.16 seconds    Goal 3: Patient will demonstrate use of speech compensatory stategies with 90% accuracy   Pt able to recall 3/4 speech strategies, improving to 4/4 strategies given min cues. Reading short narratives:  -80% accuracy independently improving to 100% accuracy given min cues for slow rate, over articulation and increased vocal volume. -pt with average 60-65dB SPL      Goal 4: Patient will demonstrate adeqaute breath support with 95% accuracy for the  sentence level. Pt required min cues for adequate breath support during structured speech tasks and within conversation. *New goal added 03/22/21*  Goal 5. The patient will say single sentences as 65 dB SPL on average, with min cues. Pt averaged 62-72db SPL during conversation and structured speech tasks today.      Other areas targeted: N/A    Education:   Edu provided re: use of speech strategies for improved intelligibility, diaphragmatic breathing exercises       Safety Devices: [x] Call light within reach  [] Chair alarm activated  [x] Bed alarm activated  [] Other: [] Call light within reach  [] Chair alarm activated  [] Bed alarm activated  [] Other:    Assessment: Pt pleasant and cooperative, agreeable to participate. Pt reported feeling fatigued this afternoon. Pt completed reading of short narratives with 80% accuracy improving to 100% accuracy given min cues for slow rate, over articulation, and increased vocal volume. Pt averaged 62-72dB SPL during conversation and within structured speech tasks today. Pt with improved MPT of 10.16 seconds today compared to 6 seconds yesterday. Plan: Continue as per plan of care. Additional Information:     Barriers toward progress: None   Discharge recommendations:  [] Home independently  [] Home with assistance []  24 hour supervision  [] ECF [] Other:  Continued Tx Upon Discharge: ? [] Yes [] No [] TBD based on progress while on ARU [] Vital Stim indicated [] Other:   Estimated discharge date: TBD  Interventions used this date:  [] Speech/Language Treatment  [] Instruction in HEP [] Group [] Dysphagia Treatment [] Cognitive Treatment   [] Other: Total Time Breakdown / Charges    Time in Time out Total Time / units   Cognitive Tx -- -- --   Speech Tx 1300 1330 30 min/ 1 unit   Dysphagia Tx -- -- --       Electronically Signed by     Marianela Willams. A CCC-SLP  Speech-Language Pathologist  [id number]

## 2021-03-25 LAB
GLUCOSE BLD-MCNC: 133 MG/DL (ref 70–99)
GLUCOSE BLD-MCNC: 141 MG/DL (ref 70–99)
GLUCOSE BLD-MCNC: 144 MG/DL (ref 70–99)
GLUCOSE BLD-MCNC: 165 MG/DL (ref 70–99)
PERFORMED ON: ABNORMAL

## 2021-03-25 PROCEDURE — 97116 GAIT TRAINING THERAPY: CPT

## 2021-03-25 PROCEDURE — 6370000000 HC RX 637 (ALT 250 FOR IP): Performed by: PHYSICAL MEDICINE & REHABILITATION

## 2021-03-25 PROCEDURE — 97110 THERAPEUTIC EXERCISES: CPT

## 2021-03-25 PROCEDURE — 97530 THERAPEUTIC ACTIVITIES: CPT

## 2021-03-25 PROCEDURE — 1280000000 HC REHAB R&B

## 2021-03-25 PROCEDURE — 6360000002 HC RX W HCPCS: Performed by: PHYSICAL MEDICINE & REHABILITATION

## 2021-03-25 PROCEDURE — 97535 SELF CARE MNGMENT TRAINING: CPT

## 2021-03-25 PROCEDURE — 92507 TX SP LANG VOICE COMM INDIV: CPT

## 2021-03-25 RX ADMIN — INSULIN GLARGINE 20 UNITS: 100 INJECTION, SOLUTION SUBCUTANEOUS at 21:00

## 2021-03-25 RX ADMIN — ASPIRIN 81 MG: 81 TABLET, CHEWABLE ORAL at 07:55

## 2021-03-25 RX ADMIN — AMLODIPINE BESYLATE 10 MG: 5 TABLET ORAL at 07:55

## 2021-03-25 RX ADMIN — CETIRIZINE HYDROCHLORIDE 10 MG: 10 TABLET, FILM COATED ORAL at 07:55

## 2021-03-25 RX ADMIN — INSULIN LISPRO 2 UNITS: 100 INJECTION, SOLUTION INTRAVENOUS; SUBCUTANEOUS at 16:25

## 2021-03-25 RX ADMIN — ALLOPURINOL 300 MG: 300 TABLET ORAL at 07:55

## 2021-03-25 RX ADMIN — ENOXAPARIN SODIUM 40 MG: 40 INJECTION SUBCUTANEOUS at 07:54

## 2021-03-25 RX ADMIN — Medication 5 MG: at 20:59

## 2021-03-25 RX ADMIN — PROPRANOLOL HYDROCHLORIDE 80 MG: 40 TABLET ORAL at 07:55

## 2021-03-25 RX ADMIN — INSULIN LISPRO 1 UNITS: 100 INJECTION, SOLUTION INTRAVENOUS; SUBCUTANEOUS at 21:00

## 2021-03-25 RX ADMIN — INSULIN GLARGINE 20 UNITS: 100 INJECTION, SOLUTION SUBCUTANEOUS at 00:19

## 2021-03-25 RX ADMIN — ATORVASTATIN CALCIUM 80 MG: 80 TABLET, FILM COATED ORAL at 21:00

## 2021-03-25 ASSESSMENT — PAIN SCALES - GENERAL
PAINLEVEL_OUTOF10: 0
PAINLEVEL_OUTOF10: 0

## 2021-03-25 NOTE — PROGRESS NOTES
Comprehensive Nutrition Assessment    Type and Reason for Visit:  Reassess    Nutrition Recommendations/Plan:   1. Continue current diet (General, Carb Control with 4 carb choices/meal)  2. Begin ONS BID (glucerna at breakfast and dinner)   3. Monitor nutrition adequacy, pertinent labs, bowel habits, wt changes, and clinical progress    Nutrition Assessment:  Follow up: pt nutritionally at risk AEB poor intake and decreased appetite. Pt reports constipation, but no N/V. Endorses decreased appetite and intake compared to baseline. pt favorable to ONS for optimal nutritional status. Continue to monitor. Malnutrition Assessment:  Malnutrition Status: At risk for malnutrition (Comment)    Context:  Acute Illness     Findings of the 6 clinical characteristics of malnutrition:  Energy Intake:  Mild decrease in energy intake (Comment)  Weight Loss:  Unable to assess(Unknown source of admission wt)       Estimated Daily Nutrient Needs:  Energy (kcal):  1593-1729kcal; Weight Used for Energy Requirements:  Ideal(45.5kg)     Protein (g):  55-68g; Weight Used for Protein Requirements:  Ideal(1.2-1.5)        Fluid (ml/day):   ; Method Used for Fluid Requirements:  1 ml/kcal      Nutrition Related Findings:  (Redness)      Wounds:  (redness on buttocks, scattered abrasions)       Current Nutrition Therapies:    DIET GENERAL; Carb Control: 4 carb choices (60 gms)/meal    Anthropometric Measures:  · Height: 5' (152.4 cm)  · Current Body Weight: 153 lb (69.4 kg)   · Admission Body Weight: 162 lb (73.5 kg)(Unable to assess source of weight)    · Ideal Body Weight: 100 lbs; % Ideal Body Weight     · BMI: 29.9  · BMI Categories: Obese Class 1 (BMI 30.0-34. 9)       Nutrition Diagnosis:   · Inadequate oral intake related to inadequate protein-energy intake as evidenced by intake 26-50%, intake 51-75%      Nutrition Interventions:   Food and/or Nutrient Delivery:  Continue Current Diet, Start Oral Nutrition Supplement  Nutrition Education/Counseling:  Education completed   Coordination of Nutrition Care:  Continue to monitor while inpatient    Goals:  Patient will eat 50% or greater of meals.        Nutrition Monitoring and Evaluation:   Behavioral-Environmental Outcomes:  Knowledge or Skill   Food/Nutrient Intake Outcomes:  Food and Nutrient Intake, Supplement Intake  Physical Signs/Symptoms Outcomes:  Biochemical Data, Nutrition Focused Physical Findings, Weight, GI Status, Constipation     Discharge Planning:    Continue current diet     Electronically signed by Santhosh Delaney Dietetic Intern on 3/25/21 at 2:20 PM EDT    Contact: [phone]

## 2021-03-25 NOTE — PROGRESS NOTES
Physical Therapy  Facility/Department: [facility]  Daily Treatment Note  NAME: Vik Junior  : 1950  MRN: [id number]    Date of Service: 3/25/2021    Discharge Recommendations:  24 hour supervision or assist, Home with Home health PT   PT Equipment Recommendations  Other: Continue to assess pending pt's progress. Assessment   Body structures, Functions, Activity limitations: Decreased functional mobility ; Decreased endurance;Decreased ROM; Decreased balance;Decreased posture;Decreased coordination;Decreased strength;Decreased fine motor control; Increased pain  Assessment: Pt seen for co-tx focusing on ambulation, dynamic standing balance, and coordination with BLE and BUE. PT will benefit from continued co-tx with OT to progress pt's mobility with ambulation and higher level balance/ADLs to increase independence. Pt fatigued this date after multiple sessions and requires frequent breaks. Second session focused on seated ther ex for BLE and core strengthening, transfers with RW, and bed mobility with grossly Min<>Mod A with RW. Pt demos improved strength and coordination the 2nd session after a longer rest break between sessions. Pt will benefit from continued skilled PT to increase functional mobility and increase independence. Continue skilled ARU PT POC. Treatment Diagnosis: impaired mobility  Prognosis: Good  PT Education: Goals;Gait Training;PT Role;Disease Specific Education;Plan of Care; Functional Mobility Training;Transfer Training;Home Exercise Program;Precautions;General Safety;Weight-bearing Education  Patient Education: Educated on upright posture, and core strengthening to improve balance. Pt verbalized understanding, but will benefit from additional education. Barriers to Learning: no  REQUIRES PT FOLLOW UP: Yes  Activity Tolerance  Activity Tolerance: Patient Tolerated treatment well;Patient limited by pain  Activity Tolerance: Vitals stable throughout.      Patient Diagnosis(es): There positioning throughout. Transfers with Mod A<>Min with RW with good endurance and energy to complete. Ambulation  Ambulation?: Yes  More Ambulation?: No  Ambulation 1  Surface: level tile  Device: Rolling Walker  Other Apparatus: Wheelchair follow  Assistance: Minimal assistance; Moderate assistance;2 Person assistance  Quality of Gait: Step to gait pattern, leading with the left LE, ataxic gait with decreased coordination and motor planning. Decreased stance time on the left and ankle inversion at times, but improved this session. Cues to pause and correct sequencing and posture frequently. Gait Deviations: Slow Lisa;Decreased step length;Decreased step height;Shuffles;Staggers;Decreased head and trunk rotation  Distance: 30 feet x4, 5 feet x6  Comments: Cues for left LE placement and left UE strapped to RW for support. Ambulation 2  Surface - 2: level tile  Device 2: (bilateral HHA)  Other Apparatus 2: Wheelchair follow  Assistance 2: Moderate assistance;2 Person assistance  Quality of Gait 2: Pt using BUE for support with ambulation and improved WB through BLE. Pt given cues for increasing step length, upright posture, and widening ROYCE. Gait Deviations: Slow Lisa;Decreased step length;Shuffles;Decreased step height  Distance: 40 feet  Comments: Fatigued this date, requiring additional rest breaks. Balance  Posture: Fair  Sitting - Static: Fair;+  Sitting - Dynamic: Fair  Standing - Static: Fair;-  Standing - Dynamic: Poor;+  Comments: Standing with pushing BUE into ball and rolling ball left and right with BUE, 3 minute standing 2 reps. Stepping strategy with RW and extended time to complete. Frequent rest breaks d/t fatigue.   Exercises  Gluteal Sets: x15 BLE  Hip Flexion: x15 BLE  Hip Abduction: 2 sets x15 BLE with assist with left LE  Knee Long Arc Quad: x15 BLE  Ankle Pumps: x15 BLE, x10 ankle eversion/inversion  Core Strengthening: x12 partial sit up,s x12 partial sit ups to the right, x12 parital sit ups to the left  Comments: Cues for sequencing and technique, extended time required. Goals  Short term goals  Time Frame for Short term goals: 1 week, 3/26/21  Short term goal 1: Pt will complete bed mobility with SBA and no rails. 3/24: Min A bed mobility with rails  Short term goal 2: Pt will complete sit<>stand with Min A with LRAD. 3/24: Min<>Mod A for sit<>stands. Short term goal 3: Pt will ambulate 100 feet with LRAD and Mod Ax1. Short term goal 4: Pt will complete bed<>chair transfer with LRAD and Min A. Short term goal 5: Pt will complete 150 feet of w/c mobility with multiple turns and IND. 3/24: 200 feet with Min A<>Mod A  Long term goals  Time Frame for Long term goals : 2 weeks, by 4/2/21  Long term goal 1: Pt will complete bed mobility with independence in flat bed without rails. Long term goal 2: Pt will complete sit<>stands with independence and LRAD. Long term goal 3: Pt will complete bed<>Chair transfer with LRAD and independence. Long term goal 4: Pt will ambulate 100 feet with LRAD and independence. Long term goal 5: Pt will ascend/descend 4 steps with bilateral rails and CGA. Patient Goals   Patient goals : To get stronger and go home. Plan    Plan  Times per week: 5 out of 7  Times per day: Daily  Plan weeks: 2 weeks, by 4/2/21  Current Treatment Recommendations: Strengthening, Neuromuscular Re-education, Home Exercise Program, ROM, Balance Training, Endurance Training, Functional Mobility Training, Transfer Training, Gait Training, Stair training, Wheelchair Mobility Training, Safety Education & Training, Patient/Caregiver Education & Training, Equipment Evaluation, Education, & procurement  Safety Devices  Type of devices:  All fall risk precautions in place, Nurse notified, Gait belt, Patient at risk for falls, Left in bed, Call light within reach, Bed alarm in place     Therapy Time   Individual Concurrent Group Co-treatment   Time In 1230     0930   Time Out 1330 1000   Minutes 60     30   Timed Code Treatment Minutes: 90 Minutes     Zelda Moore PT

## 2021-03-25 NOTE — PROGRESS NOTES
Angelica Gonzalez  3/25/2021  [id number]    Chief Complaint: Acute ischemic stroke [facility])    Subjective:   No issues overnight; therapy going well. R hip/flank pain unchanged. No new issues this AM.       ROS: no n/v, cp, sob, f/c  Objective:  Patient Vitals for the past 24 hrs:   BP Temp Temp src Pulse Resp SpO2   03/25/21 0747 (!) 136/55 97.9 °F (36.6 °C) Oral 67 18 95 %   03/24/21 2130 (!) 154/76 98.5 °F (36.9 °C) Oral 65 18 94 %     Gen: No distress, pleasant. HEENT: Normocephalic, atraumatic. CV: Regular rate and rhythm. Resp: No respiratory distress. Abd: Soft, nontender   Ext: No edema. Neuro: Alert, oriented, appropriately interactive. Wt Readings from Last 3 Encounters:   03/21/21 153 lb 14.1 oz (69.8 kg)   03/16/21 162 lb (73.5 kg)   07/14/20 158 lb 14.4 oz (72.1 kg)       Laboratory data:   Lab Results   Component Value Date    WBC 4.4 03/24/2021    HGB 13.4 03/24/2021    HCT 38.9 03/24/2021    MCV 82.1 03/24/2021     (L) 03/24/2021       Lab Results   Component Value Date     03/24/2021    K 3.4 03/24/2021    K 3.4 07/14/2020     03/24/2021    CO2 27 03/24/2021    BUN 25 03/24/2021    CREATININE 1.0 03/24/2021    GLUCOSE 152 03/24/2021    CALCIUM 10.7 03/24/2021        Therapy progress:  PT  Position Activity Restriction  Other position/activity restrictions: up with assist  Objective     Sit to Stand: Moderate Assistance, Minimal Assistance  Stand to sit: Moderate Assistance, Minimal Assistance  Bed to Chair: Moderate assistance  Device: Rolling Walker  Other Apparatus: Wheelchair follow  Assistance: Moderate assistance, 2 Person assistance  Distance: 30 feet x5, 3 feet x3  OT  PT Equipment Recommendations  Other: Continue to assess pending pt's progress.   Toilet - Technique: Stand pivot  Equipment Used: Standard bedside commode([facility] frame over toilet)  Toilet Transfers Comments: requires max assist in order to scoot back on commode  Assessment        SLP  Current Diet : Regular  Current Liquid Diet : Thin  Diet Solids Recommendation: Regular  Liquid Consistency Recommendation: Thin    Body mass index is 30.05 kg/m². Assessment and Plan:  Acute ischemic infarct: ASA, statin.  PT/OT/SLP     Diabetes: Uncontrolled, Lantus 20, SSI;      HTN: propranolol 80; increased norvasc to 10     HLD: significantly elevated triglycerides, Lipitor 80     LEX: Push PO. Patient prefers no IVF     Bowel: Per protocol  Bladder: Per protocol  Sleep: Trazodone PRN  Pain: tylenol, tramadol  DVT PPx: lovenox  CECILY: 4/10 home w/ home health  DME: TBJACOB Fernandes MD 3/25/2021, 9:44 AM

## 2021-03-25 NOTE — PROGRESS NOTES
Occupational Therapy  Facility/Department: [facility] ARU  Daily Treatment Note  NAME: Arun Vallecillo  : 1950  MRN: [id number]    Date of Service: 3/25/2021    Discharge Recommendations:  Continue to assess pending progress, 24 hour supervision or assist       Assessment   Performance deficits / Impairments: Decreased functional mobility ; Decreased endurance;Decreased coordination;Decreased ADL status; Decreased posture;Decreased ROM; Decreased balance;Decreased strength;Decreased safe awareness;Decreased high-level IADLs;Decreased cognition;Decreased vision/visual deficit; Decreased fine motor control  Assessment: Pt pleasant and cooperative, motivated. Pt cont to require modA for transfers this date with min A on 1-2 occasions early in session. Pt reports \"I need changed, I rang but I couldn't wait\". Pt requires total A to change brief, mod A to change pants. Pt requires total A for socks and shoes. Pt requiring less assist for UB ADLs this date, requests to sit at sink for bathing, increased cues for UB dressing when fatigued. Pt seen for 30 min cotx with PT d/t assist level, decreased endurance/activity tolerance, and need for 2 skilled therapists to facilitate increased functional outcomes. Pt requires A x 2 for mobility with RW and HHA, mod A x 2 for both, also mod A x2 for standing with UE WBing on ball, pt with heavy L lateral lean when fatigued, decreased righting reactions. Overall, pt progressing, limited by fatigue. Cont POC. OT Education: OT Role;Plan of Care;Transfer Training;ADL Adaptive Strategies; Home Exercise Program  Patient Education: OT POC, disease specific, mobility, safety, fall prev  Activity Tolerance  Activity Tolerance: Patient Tolerated treatment well;Patient limited by fatigue  Activity Tolerance: 131/67 HR 68  Safety Devices  Safety Devices in place: Yes  Type of devices: Bed alarm in place;Call light within reach; Chair alarm in place; Left in chair;Gait belt         Patient Diagnosis(es): There were no encounter diagnoses. has a past medical history of Diabetes mellitus ([address] GERD (gastroesophageal reflux disease), and Hypertension. has no past surgical history on file. Restrictions  Restrictions/Precautions  Restrictions/Precautions: General Precautions, Fall Risk  Position Activity Restriction  Other position/activity restrictions: up with assist  Subjective   General  Chart Reviewed: Yes  Patient assessed for rehabilitation services?: Yes  Family / Caregiver Present: No  Referring Practitioner: Savannah Up MD  Diagnosis: CVA with Left hemiparesis  Subjective  Subjective: pt supine, reports \"I couldn't wait, I'm wet\"  General Comment  Comments: RN cleared pt for OT; pt resting in bed, agreeable to therapy  Vital Signs  Patient Currently in Pain: Denies   Orientation  Orientation  Overall Orientation Status: Within Functional Limits  Objective    ADL  Feeding: Setup  Grooming: Setup(oral care, washing face)  UE Bathing: Minimal assistance  LE Bathing: Moderate assistance  UE Dressing: Moderate assistance  LE Dressing: Maximum assistance  Toileting: Maximum assistance  Additional Comments: pt requires assist with LB ADLs and toileting d/t balance deficits and fatigue with standing        Balance  Sitting Balance: Contact guard assistance  Standing Balance: Moderate assistance  Standing Balance  Time: 30 sec x multiple attempts, 1-2 min x multiple attempts, 2-3 min x 4  Activity: transfers, standing to RW, HHA for mobility, ADL and toileting tasks, UE WBing on ball  Comment: L lateral lean in stance when fatigued  Toilet Transfers  Toilet - Technique: Stand pivot  Equipment Used: Standard bedside commode(over toilet)  Toilet Transfer: Moderate assistance     Transfers  Sit Pivot Transfers: Moderate assistance  Sit to stand:  Moderate assistance  Stand to sit: Moderate assistance  Transfer Comments: fluctuated from mod to max A d/t fatigue, able to stand to grab bar with min A and cues for tech        Coordination  Gross Motor: pt with improved control of L UE for all tasks, but fatigues easily  Fine Motor: pt able to  small blocks with cues x 10 this date, some with pincer grasps        Neuromuscular Education  Neuromuscular education: Yes  NDT Treatment: Upper extremity; Sitting;Standing  Weight Bearing  Weight Bearing Technique: Yes  LUE Weight Bearing: Forearm standing; Extended arm standing     Cognition  Overall Cognitive Status: Exceptions  Arousal/Alertness: Appropriate responses to stimuli  Following Commands: Follows one step commands consistently  Attention Span: Attends with cues to redirect  Memory: Appears intact  Problem Solving: Assistance required to identify errors made;Assistance required to correct errors made  Insights: Decreased awareness of deficits  Sequencing: Requires cues for some  Cognition Comment: pt mildly impulsive some cues for safety, decreased awareness this date     Perception  Overall Perceptual Status: WFL  Unilateral Attention: Cues to maintain midline in sitting;Cues to maintain midline in standing;Cues to attend to left side of body           Upper Extremity Function  NDT Treatment: Upper extremity; Sitting;Standing                       Plan   Plan  Times per week: 5-7 days/ wk  Times per day: Daily  Plan weeks: 2 weeks  Current Treatment Recommendations: Balance Training, Functional Mobility Training, Safety Education & Training, Positioning, Neuromuscular Re-education, Self-Care / ADL, Strengthening, Gait Training, Patient/Caregiver Education & Training, Stair training, ROM, Equipment Evaluation, Education, & procurement, Home Management Training, Endurance Training, Pain Management, Wheelchair Mobility Training, Cognitive/Perceptual Training, Cognitive Reorientation, Modalities (comment)    Goals  Short term goals  Time Frame for Short term goals: 1 week (3-26-21)  Short term goal 1: Pt will complete functional transfers mod A-MET

## 2021-03-25 NOTE — PROGRESS NOTES
[facility]: ACUTE REHAB UNIT  SPEECH-LANGUAGE PATHOLOGY      [x] Daily  [] Weekly Care Conference Note  [] Discharge    [address] [facility]      KNV:3/[birth year]  WTK:[id number]  Rehab Dx/Hx: Acute ischemic stroke ([facility] [address] [I63.9]    Precautions: falls  Home situation: lives independently, manages all household tasks, active    ST Dx: [] Aphasia  [x] Dysarthria  [] Apraxia   [] Oropharyngeal dysphagia [] Cognitive Impairment  [] Other:   Date of Admit: 3/19/2021  Room #: 6162/8279-64    Current functional status (updated daily):         Pt being seen for : [x] Speech/Language Treatment  [] Dysphagia Treatment [] Cognitive Treatment  [] Other:  Communication: []WFL  [] Aphasia  [x] Dysarthria  [] Apraxia  [] Pragmatic Impairment [] Non-verbal  [] Hearing Loss  [] Other:   Cognition: [x] WFL  [] Mild  [] Moderate  [] Severe [] Unable to Assess  [] Other:  Memory: [x] WFL  [] Mild  [] Moderate  [] Severe [] Unable to Assess  [] Other:  Behavior: [x] Alert  [x] Cooperative  [x]  Pleasant  [] Confused  [] Agitated  [] Uncooperative  [] Distractible [] Motivated  [] Self-Limiting [] Anxious  [] Other:  Endurance:  [x] Adequate for participation in SLP sessions  [] Reduced overall  [] Lethargic  [] Other:  Safety: [x] No concerns at this time  [] Reduced insight into deficits  []  Reduced safety awareness [] Not following call light procedures  [] Unable to Assess  [] Other:    Current Diet Order:DIET GENERAL; Carb Control: 4 carb choices (60 gms)/meal  Swallowing Precautions: Sit up for all meals and thereafter for 30 minutes, Eat with small bites (1/2 tsp; 1 tsp), Alternate solids with liquids and Take small sips of water at the end of snacks and meals        Date: 3/25/2021      Tx session 1  [id number] Tx session 2  All tx needs met session 1   Total Timed Code Min 0 0   Total Treatment Minutes 30 0   Individual Treatment Minutes 30 0   Group Treatment Minutes 0 0   Co-Treat Minutes 0 0   Variance/Reason:  0 N/A   Pain None reported. Pain Intervention [] RN notified  [] Repositioned  [] Intervention offered and patient declined  [x] N/A  [] Other: [] RN notified  [] Repositioned  [] Intervention offered and patient declined  [] N/A  [] Other:   Subjective     Pt reclined in bed, alert and agreeable to participate. Objective:  Timeframe for Short-term Goals: 10 days by 3/30/21       Speech/ Dysarthria Goals:  Goal 1: Patient will be able to demonstrate appropriate diaphragmatic breathing and completion of exercises for speech production with min cues. Diaphragmatic breathing exercises while lying in bed:  - Completed 10/10x with use of an object to provide visual feedback.   - Benefits from cueing for coordination of movements with deep breaths focusing on diaphragmatic breathing vs clavicular breathing. Goal 2: Patient will increase maximum phonation time to 12 seconds. Improved MPT this date in both trials:  - Benefits from cueing re: deep breath with diaphragmatic breathing, focusing on increasing vocal intensity, and controlling her breath as she phonates. Pt achieved MPT of:   15.26 seconds  19.2 seconds      Goal 3: Patient will demonstrate use of speech compensatory stategies with 90% accuracy   Reviewed strategies:  - Pt recalled 4/4 strategies with min verbal cueing. Cues to carryover use at the conversational level. Goal 4: Patient will demonstrate adeqaute breath support with 95% accuracy for the  sentence level. Improving with structured tasks at the word and phrase level, though breath support remains lower in spontaneous conversation. SLP provided pt with \"homework\" today - giving the patient a goal to focus on breath support while speaking on the phone, and in turn, hopefully having the patient's conversational partner comment on her increased volume. Goal 5. The patient will say single sentences as 65 dB SPL on average, with min cues.     Used dB meter with structured speech tasks today. - Pt achieved an average 65-68 dB at the single word, phrase, and sentence level. Other areas targeted: N/A    Education:   Education provided to patient re: how the physiological / anatomical components that assist with breath support and control impact voice, vocal quality, and volume. Safety Devices: [x] Call light within reach  [] Chair alarm activated  [x] Bed alarm activated  [] Other: [] Call light within reach  [] Chair alarm activated  [] Bed alarm activated  [] Other:    Assessment: Pt highly motivated to improve. Completed all exercises with minimal cueing. Improved MPT significantly with focus on diaphragmatic breath prior to phonation and overall breath control on exhalation. Vocal intensity improved during structured tasks and as observed with dB meter. Cont goals listed above. Plan: Continue as per plan of care. Additional Information: N/A    Barriers toward progress: None   Discharge recommendations:  [] Home independently  [x] Home with assistance []  24 hour supervision  [] ECF [x] Other:  See PT/OT recs for more detail. Continued Tx Upon Discharge: ? [x] Yes [] No [] TBD based on progress while on ARU [] Vital Stim indicated [] Other:   Estimated discharge date: 4/10/2021  Interventions used this date:  [] Speech/Language Treatment  [] Instruction in HEP [] Group [] Dysphagia Treatment [] Cognitive Treatment   [] Other:       Total Time Breakdown / Charges    Time in Time out Total Time / units   Cognitive Tx -- -- --   Speech Tx 1430 1500 30 min / 1 unit   Dysphagia Tx -- -- --       Electronically Signed by  Devonte Morgan 92 [id number]  Speech-Language Pathologist

## 2021-03-25 NOTE — FLOWSHEET NOTE
Pt talked about her Jehovah's witness and on her relationship with God. She said she was raised Yazidism and her  is [facility], her children were raised Latter day as well. Pt was happy to get a Qur'an, she said she didn't read Kiswahili very well, Jadana Pole was helpful. Prayer was said with Pt at the end of visit. 03/25/21 1100   Encounter Summary   Services provided to: Patient   Referral/Consult From: [facility]   (3/25 pt asked to get a Qur'an, prayer)   Complexity of Encounter Moderate   Length of Encounter 30 minutes   Spiritual Assessment Completed Yes   Spiritual/Congregation   Type Spiritual support   Assessment Calm; Approachable   Intervention Active listening;Prayer;Provided reading materials/devotional materials; Discussed relationship with God   Outcome Expressed gratitude;Encouraged

## 2021-03-26 LAB
ANION GAP SERPL CALCULATED.3IONS-SCNC: 11 MMOL/L (ref 3–16)
BUN BLDV-MCNC: 19 MG/DL (ref 7–20)
CALCIUM SERPL-MCNC: 10.6 MG/DL (ref 8.3–10.6)
CHLORIDE BLD-SCNC: 102 MMOL/L (ref 99–110)
CO2: 26 MMOL/L (ref 21–32)
CREAT SERPL-MCNC: 1 MG/DL (ref 0.6–1.2)
GFR AFRICAN AMERICAN: >60
GFR NON-AFRICAN AMERICAN: 55
GLUCOSE BLD-MCNC: 136 MG/DL (ref 70–99)
GLUCOSE BLD-MCNC: 156 MG/DL (ref 70–99)
GLUCOSE BLD-MCNC: 156 MG/DL (ref 70–99)
GLUCOSE BLD-MCNC: 176 MG/DL (ref 70–99)
GLUCOSE BLD-MCNC: 198 MG/DL (ref 70–99)
HCT VFR BLD CALC: 41.7 % (ref 36–48)
HEMOGLOBIN: 14.5 G/DL (ref 12–16)
MCH RBC QN AUTO: 28.6 PG (ref 26–34)
MCHC RBC AUTO-ENTMCNC: 34.8 G/DL (ref 31–36)
MCV RBC AUTO: 82.3 FL (ref 80–100)
PDW BLD-RTO: 14.2 % (ref 12.4–15.4)
PERFORMED ON: ABNORMAL
PLATELET # BLD: 129 K/UL (ref 135–450)
PMV BLD AUTO: 7.8 FL (ref 5–10.5)
POTASSIUM SERPL-SCNC: 3.6 MMOL/L (ref 3.5–5.1)
RBC # BLD: 5.07 M/UL (ref 4–5.2)
SODIUM BLD-SCNC: 139 MMOL/L (ref 136–145)
WBC # BLD: 5.2 K/UL (ref 4–11)

## 2021-03-26 PROCEDURE — 36415 COLL VENOUS BLD VENIPUNCTURE: CPT

## 2021-03-26 PROCEDURE — 97112 NEUROMUSCULAR REEDUCATION: CPT

## 2021-03-26 PROCEDURE — 6370000000 HC RX 637 (ALT 250 FOR IP): Performed by: PHYSICAL MEDICINE & REHABILITATION

## 2021-03-26 PROCEDURE — 97110 THERAPEUTIC EXERCISES: CPT

## 2021-03-26 PROCEDURE — 1280000000 HC REHAB R&B

## 2021-03-26 PROCEDURE — 97116 GAIT TRAINING THERAPY: CPT

## 2021-03-26 PROCEDURE — 97535 SELF CARE MNGMENT TRAINING: CPT

## 2021-03-26 PROCEDURE — 92507 TX SP LANG VOICE COMM INDIV: CPT

## 2021-03-26 PROCEDURE — 6360000002 HC RX W HCPCS: Performed by: PHYSICAL MEDICINE & REHABILITATION

## 2021-03-26 PROCEDURE — 80048 BASIC METABOLIC PNL TOTAL CA: CPT

## 2021-03-26 PROCEDURE — 85027 COMPLETE CBC AUTOMATED: CPT

## 2021-03-26 PROCEDURE — 97530 THERAPEUTIC ACTIVITIES: CPT

## 2021-03-26 RX ADMIN — PROPRANOLOL HYDROCHLORIDE 80 MG: 40 TABLET ORAL at 07:51

## 2021-03-26 RX ADMIN — ALLOPURINOL 300 MG: 300 TABLET ORAL at 07:51

## 2021-03-26 RX ADMIN — ENOXAPARIN SODIUM 40 MG: 40 INJECTION SUBCUTANEOUS at 07:51

## 2021-03-26 RX ADMIN — ASPIRIN 81 MG: 81 TABLET, CHEWABLE ORAL at 07:51

## 2021-03-26 RX ADMIN — ATORVASTATIN CALCIUM 80 MG: 80 TABLET, FILM COATED ORAL at 20:42

## 2021-03-26 RX ADMIN — Medication 5 MG: at 20:42

## 2021-03-26 RX ADMIN — INSULIN LISPRO 1 UNITS: 100 INJECTION, SOLUTION INTRAVENOUS; SUBCUTANEOUS at 20:42

## 2021-03-26 RX ADMIN — CETIRIZINE HYDROCHLORIDE 10 MG: 10 TABLET, FILM COATED ORAL at 07:52

## 2021-03-26 RX ADMIN — INSULIN LISPRO 2 UNITS: 100 INJECTION, SOLUTION INTRAVENOUS; SUBCUTANEOUS at 06:55

## 2021-03-26 RX ADMIN — AMLODIPINE BESYLATE 10 MG: 5 TABLET ORAL at 07:51

## 2021-03-26 RX ADMIN — INSULIN LISPRO 2 UNITS: 100 INJECTION, SOLUTION INTRAVENOUS; SUBCUTANEOUS at 11:43

## 2021-03-26 RX ADMIN — INSULIN GLARGINE 20 UNITS: 100 INJECTION, SOLUTION SUBCUTANEOUS at 20:44

## 2021-03-26 ASSESSMENT — PAIN SCALES - GENERAL: PAINLEVEL_OUTOF10: 0

## 2021-03-26 NOTE — PROGRESS NOTES
lunch. Patient Diagnosis(es): There were no encounter diagnoses. has a past medical history of Diabetes mellitus ([address] GERD (gastroesophageal reflux disease), and Hypertension. has no past surgical history on file. Restrictions  Restrictions/Precautions  Restrictions/Precautions: General Precautions, Fall Risk  Position Activity Restriction  Other position/activity restrictions: up with assist  Subjective   General  Chart Reviewed: Yes  Response To Previous Treatment: Patient with no complaints from previous session. Family / Caregiver Present: No  Referring Practitioner: Verlinda Aschoff, MD  Subjective  Subjective: Pt agreeable to therapy. First session with OT and pt up in w/c in gym. Second session pt supine in bed with stomach ache from lunch. Pain Screening  Patient Currently in Pain: Denies  Vital Signs  Patient Currently in Pain: Denies       Orientation  Orientation  Overall Orientation Status: Within Normal Limits  Cognition   Cognition  Overall Cognitive Status: Exceptions  Arousal/Alertness: Appropriate responses to stimuli  Following Commands: Follows one step commands consistently  Attention Span: Attends with cues to redirect  Memory: Appears intact  Problem Solving: Assistance required to identify errors made;Assistance required to correct errors made  Insights: Decreased awareness of deficits  Sequencing: Requires cues for some  Objective   Bed mobility  Supine to Sit: Contact guard assistance  Sit to Supine: Contact guard assistance(to block left LE from falling off bed with transfer)  Comment: HR used, extended time to complete, HOB elevated to 30 degrees. Transfers  Sit to Stand: Contact guard assistance;Minimal Assistance  Stand to sit: Contact guard assistance;Minimal Assistance  Bed to Chair: Minimal assistance; Moderate assistance  Car Transfer: Minimal Assistance;2 Person Assistance  Comment: Sit<>stands CGA<>Min to RW throughout sessions; intermittent cues for positioning and Bridging: Other(comment)(x12 BLE, BLE, or right LE, or left LE)  Straight Leg Raise: x12 BLE  Quad Sets: x12 BLE  Heelslides: x12 BLE  Gluteal Sets: x12 BLE  Hip Abduction: x12 BLE  Knee Short Arc Quad: x12 BLE  Ankle Pumps: x12 BLE, partial ROM on the left LE. Core Strengthening: x10 partial sit ups, x10 partial sit ups to the right, x10 parital sit ups to the left  Comments: Cues for sequencing and technique, extended time required. Pt verbalized understanding to complete over the weekend. Pt demos better control of the left LE with all ther ex this date. Goals  Short term goals  Time Frame for Short term goals: 1 week, 3/26/21  Short term goal 1: Pt will complete bed mobility with SBA and no rails. 3/26: CGA. Short term goal 2: Pt will complete sit<>stand with Min A with LRAD. 3/24: Min<>Mod A for sit<>stands. Short term goal 3: Pt will ambulate 100 feet with LRAD and Mod Ax1. Short term goal 4: Pt will complete bed<>chair transfer with LRAD and Min A. Short term goal 5: Pt will complete 150 feet of w/c mobility with multiple turns and IND. 3/26: 150 feet with SBA  Long term goals  Time Frame for Long term goals : 2 weeks, by 4/2/21  Long term goal 1: Pt will complete bed mobility with independence in flat bed without rails. Long term goal 2: Pt will complete sit<>stands with independence and LRAD. Long term goal 3: Pt will complete bed<>Chair transfer with LRAD and independence. Long term goal 4: Pt will ambulate 100 feet with LRAD and independence. Long term goal 5: Pt will ascend/descend 4 steps with bilateral rails and CGA. Patient Goals   Patient goals : To get stronger and go home.     Plan    Plan  Times per week: 5 out of 7  Times per day: Daily  Plan weeks: 2 weeks, by 4/2/21  Current Treatment Recommendations: Strengthening, Neuromuscular Re-education, Home Exercise Program, ROM, Balance Training, Endurance Training, Functional Mobility Training, Transfer Training, Gait Training, Stair training, Wheelchair Mobility Training, Safety Education & Training, Patient/Caregiver Education & Training, Equipment Evaluation, Education, & procurement  Safety Devices  Type of devices:  All fall risk precautions in place, Nurse notified, Gait belt, Patient at risk for falls, Left in bed, Call light within reach, Bed alarm in place     Therapy Time   Individual Concurrent Group Co-treatment   Time In 1400     0900   Time Out 1500     0930   Minutes 60     30   Timed Code Treatment Minutes: [address] [facility], PT

## 2021-03-26 NOTE — PROGRESS NOTES
Occupational Therapy  Facility/Department: [facility]  Daily Treatment Note  NAME: Kevin Mccain  : 1950  MRN: [id number]    Date of Service: 3/26/2021    Discharge Recommendations:  Continue to assess pending progress, 24 hour supervision or assist, Outpatient OT  OT Equipment Recommendations  Other: CTA pending progress    Assessment   Performance deficits / Impairments: Decreased functional mobility ; Decreased endurance;Decreased coordination;Decreased ADL status; Decreased posture;Decreased ROM; Decreased balance;Decreased strength;Decreased safe awareness;Decreased high-level IADLs;Decreased cognition;Decreased vision/visual deficit; Decreased fine motor control  Assessment: Pt pleasant and cooperative, motivated. Pt completed bathing and dressing with decreased assist this date. Pt with increased control/AROM of L UE this date as well. Transfers fluctuate from CGA with grab bar to mod A for sit pivots. Pt able to grasp 20 blocks and place on small dowel, dropping <25% of blocks. Pt seen for 30 min cotx with PT to address mobility and balance d/t assist level, complexity of condition, cognition/impulsivity. Pt requires up to mod A x 2 for amb with RW, cues forLUE/LLE. Pt tolerates quadriped x 2 attempts, total A x 2 to obtain quadriped on 1st attempt, min A x 2 on 2nd attempt, on 2nd attempt pt able to weight shift forward/back and laterally then come to high kneeling position. Pt progressing well toward goals, cont POC. OT Education: OT Role;Plan of Care;Transfer Training;ADL Adaptive Strategies; Home Exercise Program  Patient Education: OT POC, disease specific, mobility, safety, fall prev  Activity Tolerance  Activity Tolerance: Patient Tolerated treatment well;Patient limited by fatigue  Activity Tolerance: 132/77 HR 69  Safety Devices  Safety Devices in place: Yes  Type of devices: Bed alarm in place;Call light within reach;Gait belt;Left in bed         Patient Diagnosis(es): There were no encounter diagnoses. has a past medical history of Diabetes mellitus ([facility] [address] GERD (gastroesophageal reflux disease), and Hypertension. has no past surgical history on file. Restrictions  Restrictions/Precautions  Restrictions/Precautions: General Precautions, Fall Risk  Position Activity Restriction  Other position/activity restrictions: up with assist  Subjective   General  Chart Reviewed: Yes  Patient assessed for rehabilitation services?: Yes  Family / Caregiver Present: No  Referring Practitioner: Dane Albarran MD  Diagnosis: CVA with Left hemiparesis  Subjective  Subjective: Pt supine, pleasant and cooperative  General Comment  Comments: RN cleared pt for OT; pt resting in bed, agreeable to therapy  Vital Signs  Patient Currently in Pain: Denies   Orientation     Objective    ADL  Grooming: Setup  UE Bathing: Supervision  LE Bathing: Contact guard assistance  UE Dressing: Minimal assistance; Moderate assistance  LE Dressing: Moderate assistance(assist to pull up pants/brief and thanh shoes)  Toileting: Maximum assistance(clothing mgmt)        Balance  Sitting Balance: Contact guard assistance  Standing Balance: Moderate assistance  Standing Balance  Time: 30 sec x multiple attempts, 1-2 min x multiple attempts, 2-3 min x 4  Activity: transfers, standing to RW, HHA for mobility, ADL and toileting tasks  Comment: L lateral lean in stance when fatigued  Functional Mobility  Functional - Mobility Device: Rolling Walker  Activity: Other  Assist Level: Dependent/Total  Functional Mobility Comments: mod a x 2 for amb with RW  Toilet Transfers  Toilet - Technique: Stand pivot  Equipment Used: Standard bedside commode(over toilet)  Toilet Transfer: Moderate assistance  Shower Transfers  Shower - Transfer From: Wheelchair  Shower - Transfer Type: To and From  Shower - Transfer To:  Transfer tub bench  Shower - Technique: Lateral;Stand pivot  Shower Transfers: Minimal assistance     Transfers  Stand Pivot Transfers: Minimal assistance; Moderate assistance  Sit Pivot Transfers: Moderate assistance  Sit to stand: Minimal assistance; Moderate assistance  Stand to sit: Minimal assistance; Moderate assistance  Transfer Comments: CGA to stand to grab bar, min/mod A to stand and transfer surface<>surface, A x 2 for fucntional mobility        Coordination  Gross Motor: pt with improved control of L UE for all tasks, but fatigues easily  Fine Motor: pt able to  small blocks with cues x 10 this date, some with pincer grasps        Neuromuscular Education  Neuromuscular education: Yes  NDT Treatment: Upper extremity; Sitting;Standing; Lower extremity;Gait   Weight Bearing  Weight Bearing Technique: Yes  LUE Weight Bearing: Harolyn Lin; High kneeling;Forearm standing; Extended arm seated  Response To Weight Bearing Technique: pt tolerates quadriped with forward/reverse and lateral weight shifts     Cognition  Overall Cognitive Status: Exceptions  Arousal/Alertness: Appropriate responses to stimuli  Following Commands: Follows one step commands consistently  Attention Span: Attends with cues to redirect  Memory: Appears intact  Problem Solving: Assistance required to identify errors made;Assistance required to correct errors made  Insights: Decreased awareness of deficits  Sequencing: Requires cues for some  Cognition Comment: pt mildly impulsive some cues for safety, decreased awareness this date     Perception  Overall Perceptual Status: WFL  Unilateral Attention: Cues to maintain midline in sitting;Cues to maintain midline in standing;Cues to attend to left side of body           Upper Extremity Function  NDT Treatment: Upper extremity; Sitting;Standing; Lower extremity;Gait                        Plan   Plan  Times per week: 5-7 days/ wk  Times per day: Daily  Plan weeks: 2 weeks  Current Treatment Recommendations: Balance Training, Functional Mobility Training, Safety Education & Training, Positioning, Neuromuscular Re-education, Self-Care

## 2021-03-26 NOTE — PROGRESS NOTES
[facility]: ACUTE REHAB UNIT  SPEECH-LANGUAGE PATHOLOGY      [x] Daily  [] Weekly Care Conference Note  [] Discharge    [address]      PHP:7/01/9881  CXK:[id number]  Rehab Dx/Hx: Acute ischemic stroke ([facility] [address] [I63.9]    Precautions: falls  Home situation: lives independently, manages all household tasks, active    ST Dx: [] Aphasia  [x] Dysarthria  [] Apraxia   [] Oropharyngeal dysphagia [] Cognitive Impairment  [] Other:   Date of Admit: 3/19/2021  Room #: 0896/8076-61    Current functional status (updated daily):         Pt being seen for : [x] Speech/Language Treatment  [] Dysphagia Treatment [] Cognitive Treatment  [] Other:  Communication: []WFL  [] Aphasia  [x] Dysarthria  [] Apraxia  [] Pragmatic Impairment [] Non-verbal  [] Hearing Loss  [] Other:   Cognition: [x] WFL  [] Mild  [] Moderate  [] Severe [] Unable to Assess  [] Other:  Memory: [x] WFL  [] Mild  [] Moderate  [] Severe [] Unable to Assess  [] Other:  Behavior: [x] Alert  [x] Cooperative  [x]  Pleasant  [] Confused  [] Agitated  [] Uncooperative  [] Distractible [] Motivated  [] Self-Limiting [] Anxious  [] Other:  Endurance:  [x] Adequate for participation in SLP sessions  [] Reduced overall  [] Lethargic  [] Other:  Safety: [x] No concerns at this time  [] Reduced insight into deficits  []  Reduced safety awareness [] Not following call light procedures  [] Unable to Assess  [] Other:    Current Diet Order:DIET GENERAL; Carb Control: 4 carb choices (60 gms)/meal  Dietary Nutrition Supplements: Diabetic Oral Supplement  Swallowing Precautions: Sit up for all meals and thereafter for 30 minutes, Eat with small bites (1/2 tsp; 1 tsp), Alternate solids with liquids and Take small sips of water at the end of snacks and meals        Date: 3/26/2021      Tx session 1  [id number] Tx session 2  All tx needs met session 1   Total Timed Code Min 0 0   Total Treatment Minutes 30 0   Individual Treatment Minutes 30 0   Group Treatment Minutes 0 0 Co-Treat Minutes 0 0   Variance/Reason:  0 N/A   Pain None reported. Pain Intervention [] RN notified  [] Repositioned  [] Intervention offered and patient declined  [x] N/A  [] Other: [] RN notified  [] Repositioned  [] Intervention offered and patient declined  [] N/A  [] Other:   Subjective     Pt seen partially reclined in bed, alert and agreeable to evaluation. Objective:  Timeframe for Short-term Goals: 10 days by 3/30/21       Speech/ Dysarthria Goals:  Goal 1: Patient will be able to demonstrate appropriate diaphragmatic breathing and completion of exercises for speech production with min cues. Diaphragmatic breathing exercises while lying in bed:  - pt independently recalled exercise / purpose of completion of this exercise. - Benefits from cueing for coordination of movements with deep breaths focusing on diaphragmatic breathing vs clavicular breathing. Goal 2: Patient will increase maximum phonation time to 12 seconds. MPT trials:  - Benefits from cueing re: deep breath with diaphragmatic breathing, focusing on increasing vocal intensity, and controlling her breath as she phonates. Pt achieved average MPT across 3 trials of 7.9 s. Goal 3: Patient will demonstrate use of speech compensatory stategies with 90% accuracy   Reviewed strategies:  - Pt recalled 4/4 strategies with min verbal cueing. Acronym 'SLOB' written on whiteboard. Goal 4: Patient will demonstrate adeqaute breath support with 95% accuracy for the  sentence level. Pt was unable to successfully recall \"homework\" given during yesterday's session: \"homework\" was then reviewed-- focus on breath support while speaking on the phone and asking friends / family about her volume when speaking to them on the phone (I.e. \"is it an appropriate level? \"). Goal 5. The patient will say single sentences as 65 dB SPL on average, with min cues. Used dB meter with structured speech tasks today.    - Pt achieved an average 63-70 dB at the single word, phrase, and sentence level during today's tx session. Other areas targeted: Paragraph readings:   /s/: 100% intelligible, mod cues for increased volume    /z/: 100% intelligible, mod cues for increased volume    Increase pitch range (low to high) on 'ah' and glottal attacks for targeting increased volume / dB: average of 80 dB      Education:   SLP reviewed compensatory speech strategies for improved speech intelligibility, rationale for completion of today's tasks. Safety Devices: [x] Call light within reach  [] Chair alarm activated  [x] Bed alarm activated  [] Other: [] Call light within reach  [] Chair alarm activated  [] Bed alarm activated  [] Other:    Assessment: Pt pleasant and cooperative throughout session, continues to be highly motivated to improve and participate. She continues to require increased cueing for volume during tasks (average of 63-79 dB during structured paragraph readings, see above). Speech intelligibility considered ~100% at the conversation level / during structured tasks despite low volume. Cont goals listed above. Plan: Continue as per plan of care. Additional Information: N/A    Barriers toward progress: None   Discharge recommendations:  [] Home independently  [x] Home with assistance []  24 hour supervision  [] ECF [x] Other:  See PT/OT recs for more detail. Continued Tx Upon Discharge: ? [x] Yes [] No [] TBD based on progress while on ARU [] Vital Stim indicated [] Other:   Estimated discharge date: 4/10/2021  Interventions used this date:  [] Speech/Language Treatment  [] Instruction in HEP [] Group [] Dysphagia Treatment [] Cognitive Treatment   [] Other:       Total Time Breakdown / Charges    Time in Time out Total Time / units   Cognitive Tx -- -- --   Speech Tx 1100 1130 30 min / 1 unit   Dysphagia Tx -- -- --       Electronically Signed by  Neelam Cook M.S. [id number] [facility]  Speech-language pathologist  [phone]    [facility] Meghna Pettit, Speech Therapy

## 2021-03-26 NOTE — CARE COORDINATION
SW spoke with the Pt at Bedside. She reported that she does not have any questions or concerns at this time. SW encouraged the Pt to call if she did need any questions answered. Pt reported that she is keeping her family up to date on her progress.   CRISTIAN Pham

## 2021-03-26 NOTE — PLAN OF CARE
Problem: Falls - Risk of:  Goal: Will remain free from falls  Description: Will remain free from falls  3/26/2021 1046 by Gucci Spencer RN  Outcome: Ongoing  Note: Pt will remain free from falls throughout hospital stay. Fall precautions in place, bed alarm on, bed in lowest position with wheels locked and side rails 2/4 up. Room door open and hourly rounding completed. Will continue to monitor throughout shift.

## 2021-03-27 LAB
GLUCOSE BLD-MCNC: 119 MG/DL (ref 70–99)
GLUCOSE BLD-MCNC: 133 MG/DL (ref 70–99)
GLUCOSE BLD-MCNC: 158 MG/DL (ref 70–99)
GLUCOSE BLD-MCNC: 185 MG/DL (ref 70–99)
PERFORMED ON: ABNORMAL

## 2021-03-27 PROCEDURE — 97535 SELF CARE MNGMENT TRAINING: CPT

## 2021-03-27 PROCEDURE — 1280000000 HC REHAB R&B

## 2021-03-27 PROCEDURE — 6360000002 HC RX W HCPCS: Performed by: PHYSICAL MEDICINE & REHABILITATION

## 2021-03-27 PROCEDURE — 6370000000 HC RX 637 (ALT 250 FOR IP): Performed by: PHYSICAL MEDICINE & REHABILITATION

## 2021-03-27 PROCEDURE — 97116 GAIT TRAINING THERAPY: CPT

## 2021-03-27 PROCEDURE — 97530 THERAPEUTIC ACTIVITIES: CPT

## 2021-03-27 RX ADMIN — INSULIN LISPRO 2 UNITS: 100 INJECTION, SOLUTION INTRAVENOUS; SUBCUTANEOUS at 16:35

## 2021-03-27 RX ADMIN — ENOXAPARIN SODIUM 40 MG: 40 INJECTION SUBCUTANEOUS at 08:34

## 2021-03-27 RX ADMIN — AMLODIPINE BESYLATE 10 MG: 5 TABLET ORAL at 08:34

## 2021-03-27 RX ADMIN — INSULIN GLARGINE 20 UNITS: 100 INJECTION, SOLUTION SUBCUTANEOUS at 20:55

## 2021-03-27 RX ADMIN — ASPIRIN 81 MG: 81 TABLET, CHEWABLE ORAL at 08:34

## 2021-03-27 RX ADMIN — CETIRIZINE HYDROCHLORIDE 10 MG: 10 TABLET, FILM COATED ORAL at 08:34

## 2021-03-27 RX ADMIN — ATORVASTATIN CALCIUM 80 MG: 80 TABLET, FILM COATED ORAL at 20:52

## 2021-03-27 RX ADMIN — Medication 5 MG: at 20:52

## 2021-03-27 RX ADMIN — PROPRANOLOL HYDROCHLORIDE 80 MG: 40 TABLET ORAL at 08:34

## 2021-03-27 RX ADMIN — ALLOPURINOL 300 MG: 300 TABLET ORAL at 08:34

## 2021-03-27 RX ADMIN — INSULIN LISPRO 2 UNITS: 100 INJECTION, SOLUTION INTRAVENOUS; SUBCUTANEOUS at 11:33

## 2021-03-27 ASSESSMENT — PAIN SCALES - GENERAL
PAINLEVEL_OUTOF10: 0

## 2021-03-27 NOTE — PLAN OF CARE
Problem: Pain:  Goal: Pain level will decrease  Description: Pain level will decrease  Outcome: Ongoing  Goal: Control of acute pain  Description: Control of acute pain  Outcome: Ongoing  Goal: Control of chronic pain  Description: Control of chronic pain  Outcome: Ongoing     Problem: Skin Integrity:  Goal: Will show no infection signs and symptoms  Description: Will show no infection signs and symptoms  Outcome: Ongoing  Goal: Absence of new skin breakdown  Description: Absence of new skin breakdown  Outcome: Ongoing     Problem: Falls - Risk of:  Goal: Will remain free from falls  Description: Will remain free from falls  3/26/2021 2253 by Mj DORSEY  Outcome: Ongoing  3/26/2021 1046 by Juliana Botello RN  Outcome: Ongoing  Note: Pt will remain free from falls throughout hospital stay. Fall precautions in place, bed alarm on, bed in lowest position with wheels locked and side rails 2/4 up. Room door open and hourly rounding completed. Will continue to monitor throughout shift.     Goal: Absence of physical injury  Description: Absence of physical injury  Outcome: Ongoing     Problem: Nutrition  Goal: Optimal nutrition therapy  Outcome: Ongoing

## 2021-03-27 NOTE — PROGRESS NOTES
Physical Therapy  Facility/Department: [facility]  Daily Treatment Note  NAME: Lola Flores  : 1950  MRN: [id number]    Date of Service: 3/27/2021    Discharge Recommendations:  24 hour supervision or assist, Home with Home health PT   PT Equipment Recommendations  Other: Continue to assess pending pt's progress. Potentially requiring RW with clamshell and manual w/c. Assessment   Body structures, Functions, Activity limitations: Decreased functional mobility ; Decreased endurance;Decreased ROM; Decreased balance;Decreased posture;Decreased coordination;Decreased strength;Decreased fine motor control; Increased pain  Assessment: Pt seen as 30 minute co-tx with OT for progression of functional mobility and gait activities. Pt benefits from x 2 skilled hand to provide increased feedback and cues and promote improved safety and performance with gait and mobility. Pt continues to demonstrate good progress with functional mobility and gait. Pt demonstrates bed mobility with SBA, t/f with RW or grab bar with CGA and ambulates up to 48' with RW with Piotr x 2. PT assisting to blocking L knee to prevent hyperextension in stance with variable ability to maintain neutral positoning with cues. Pt is limited by decreased strength and decreased activity tolerance and requires cues to take breaks with LLE fatigue in gait. Pt will benefit from continued skilled PT in ARU to address above deficits, will continue to progress mobility as tolerated. Treatment Diagnosis: impaired mobility  Prognosis: Good  Decision Making: Medium Complexity  PT Education: Goals;Gait Training;PT Role;Disease Specific Education;Plan of Care; Functional Mobility Training;Transfer Training;Home Exercise Program;Precautions;General Safety;Weight-bearing Education  Patient Education: Educated on upright posture, and core strengthening to improve balance. Pt verbalized understanding, but will benefit from additional education.   Barriers to Learning: times. LLE genu recurvatuum. Pt mildly unsteady no overt LOB. Gait Deviations: Slow Lisa;Decreased step length;Decreased step height;Shuffles;Staggers;Decreased head and trunk rotation  Distance: 13' + 48' + 25'  Comments: Decreased LLE noted with increased fatigue requiring cues for pt to take seated rest break. OT providing tactile cues at trunk and assistance for balance, PT on L seated on stool using UE to prevent LLE hyperextension in stance, VC provided to maintain slight knee flexion, no buckling noted. Balance  Posture: Fair  Sitting - Static: Good;-  Sitting - Dynamic: Fair;+  Standing - Static: Fair  Standing - Dynamic: Fair;-  Comments: Pt completes x 2 reps static standing with RW LUE support in clamshell with use of RUE to complete ADL dressing task. PT providing CGA to Piotr for balance and cues to decrease LLE ankle inversion, OT assisting with completion of ADL. Each bout maintained ~ 1 minute, increased R lateral lean, no overt LOB. Pt requires SBA for unsupported seated balance while OT assisting with ADL, intermittent lean but able to self correct. Goals  Short term goals  Time Frame for Short term goals: 1 week, 3/26/21  Short term goal 1: Pt will complete bed mobility with SBA and no rails. -- GOAL PARTIALLY MET 3/27: Supine to sit SBA with HOB elevated and BR. Short term goal 2: Pt will complete sit<>stand with Min A with LRAD.  -- GOAL MET Piotr to CGA with RW  Short term goal 3: Pt will ambulate 100 feet with LRAD and Mod Ax1. -- GOAL NOT MET but progressing Piotr x 2 up to 48' with RW  Short term goal 4: Pt will complete bed<>chair transfer with LRAD and Min A. -- GOAL MET Piotr to CGA with RW  Short term goal 5: Pt will complete 150 feet of w/c mobility with multiple turns and IND. 3/26: 150 feet with SBA  Long term goals  Time Frame for Long term goals : 2 weeks, by 4/2/21  Long term goal 1: Pt will complete bed mobility with independence in flat bed without rails. Long term goal 2: Pt will complete sit<>stands with independence and LRAD. Long term goal 3: Pt will complete bed<>Chair transfer with LRAD and independence. Long term goal 4: Pt will ambulate 100 feet with LRAD and independence. Long term goal 5: Pt will ascend/descend 4 steps with bilateral rails and CGA. Patient Goals   Patient goals : To get stronger and go home. Plan    Plan  Times per week: 5 out of 7  Times per day: Daily  Plan weeks: 2 weeks, by 4/2/21  Current Treatment Recommendations: Strengthening, Neuromuscular Re-education, Home Exercise Program, ROM, Balance Training, Endurance Training, Functional Mobility Training, Transfer Training, Gait Training, Stair training, Wheelchair Mobility Training, Safety Education & Training, Patient/Caregiver Education & Training, Equipment Evaluation, Education, & procurement  Safety Devices  Type of devices:  All fall risk precautions in place, Nurse notified, Gait belt, Patient at risk for falls, Call light within reach(Pt seated on commode, RN and PCA notified of pt on commode and pt instructed to use pull cord when completed)     Therapy Time   Individual Concurrent Group Co-treatment   Time In       0905   Time Out       0935   Minutes       30   Timed Code Treatment Minutes: [address], PT, DPT

## 2021-03-27 NOTE — PROGRESS NOTES
Practitioner: Savannah Up MD  Diagnosis: CVA with Left hemiparesis  Subjective  Subjective: Pt supine, pleasant and cooperative  General Comment  Comments: RN cleared pt for OT; pt resting in bed, agreeable to therapy  Vital Signs  Patient Currently in Pain: Denies   Orientation  Orientation  Overall Orientation Status: Within Functional Limits  Objective    ADL  Feeding: Setup  Grooming: Setup(seated)  UE Dressing: Minimal assistance(assist to don bra and thread LUE)  LE Dressing: Moderate assistance(assist to don pants over hips and don shoes)        Balance  Sitting Balance: Stand by assistance  Standing Balance: Minimal assistance(RW)  Standing Balance  Time: x30 sec x4, x2-3 mins x3  Activity: ADLs, mobility, t/fs  Comment: L lateral lean in stance when fatigued  Functional Mobility  Functional - Mobility Device: Rolling Walker  Activity: Other  Assist Level: Dependent/Total  Functional Mobility Comments: min a x2 for am with RW  Toilet Transfers  Toilet - Technique: Stand pivot  Equipment Used: Grab bars  Toilet Transfer: Minimal assistance  Toilet Transfers Comments: max cueing for safety and sequencing of movements  Bed mobility  Supine to Sit: Stand by assistance  Sit to Supine: Unable to assess  Transfers  Sit to stand: Contact guard assistance  Stand to sit: Contact guard assistance  Transfer Comments: RW                       Cognition  Overall Cognitive Status: Exceptions  Arousal/Alertness: Appropriate responses to stimuli  Following Commands:  Follows one step commands consistently  Attention Span: Attends with cues to redirect  Memory: Appears intact  Safety Judgement: Decreased awareness of need for assistance  Problem Solving: Assistance required to generate solutions  Insights: Decreased awareness of deficits  Initiation: Requires cues for some  Sequencing: Requires cues for some              Plan   Plan  Times per week: 5-7 days/ wk  Times per day: Daily  Plan weeks: 2 weeks  Current Treatment Recommendations: Balance Training, Functional Mobility Training, Safety Education & Training, Positioning, Neuromuscular Re-education, Self-Care / ADL, Strengthening, Gait Training, Patient/Caregiver Education & Training, Stair training, ROM, Equipment Evaluation, Education, & procurement, Home Management Training, Endurance Training, Pain Management, Wheelchair Mobility Training, Cognitive/Perceptual Training, Cognitive Reorientation, Modalities (comment)          Goals  Short term goals  Time Frame for Short term goals: 1 week (3-26-21)  Short term goal 1: Pt will complete functional transfers mod A-MET 3/25 mod A  Short term goal 2: Pt will complete UBD using bisi-techniques mod A-MET 3/25 mod A  Short term goal 3: min assist with LUE self ROM/positioning  Short term goal 4: Pt will complete toileting mod A  Long term goals  Time Frame for Long term goals : 2 weeks (4-2-21)  Long term goal 1: Pt will complete functional transfers SBA  Long term goal 2: Pt will complete UBD using bisi-techniques SBA  Long term goal 3: Pt will complete toileting CGA  Patient Goals   Patient goals : \"get stronger so I can get back to my normal routines\"       Therapy Time   Individual Concurrent Group Co-treatment   Time In       0905   Time Out       0935   Minutes       30   Timed Code Treatment Minutes: 69 ROSEANNA Boyd/MAVIS

## 2021-03-27 NOTE — PROGRESS NOTES
Abril Petersen  3/27/2021  [id number]    Chief Complaint: Acute ischemic stroke ([address]    Subjective:   No new changes, doing well today, no new complaints     ROS: no n/v, cp, sob, f/c  Objective:  Patient Vitals for the past 24 hrs:   BP Temp Temp src Pulse Resp SpO2   03/27/21 0832 (!) 162/64 97.9 °F (36.6 °C) Oral 67 16 97 %   03/26/21 2030 (!) 142/96 98 °F (36.7 °C) Oral 65 18 97 %     Gen: No distress, pleasant. HEENT: Normocephalic, atraumatic. CV: Regular rate and rhythm. Resp: No respiratory distress. Abd: Soft, nontender   Ext: No edema. Neuro: Alert, oriented, appropriately interactive. Wt Readings from Last 3 Encounters:   03/21/21 153 lb 14.1 oz (69.8 kg)   03/16/21 162 lb (73.5 kg)   07/14/20 158 lb 14.4 oz (72.1 kg)       Laboratory data:   Lab Results   Component Value Date    WBC 5.2 03/26/2021    HGB 14.5 03/26/2021    HCT 41.7 03/26/2021    MCV 82.3 03/26/2021     (L) 03/26/2021       Lab Results   Component Value Date     03/26/2021    K 3.6 03/26/2021    K 3.4 07/14/2020     03/26/2021    CO2 26 03/26/2021    BUN 19 03/26/2021    CREATININE 1.0 03/26/2021    GLUCOSE 156 03/26/2021    CALCIUM 10.6 03/26/2021        Therapy progress:  PT  Position Activity Restriction  Other position/activity restrictions: up with assist  Objective     Sit to Stand: Contact guard assistance  Stand to sit: Contact guard assistance  Bed to Chair: Minimal assistance, Moderate assistance  Device: Rolling Walker  Other Apparatus: Wheelchair follow(ARMANDO becker)  Assistance: Minimal assistance, 2 Person assistance(Piotr x 2)  Distance: 13' + 48' + 25'  OT  PT Equipment Recommendations  Other: Continue to assess pending pt's progress. Potentially requiring RW with clamshell and manual w/c.   Toilet - Technique: Stand pivot  Equipment Used: Grab bars  Toilet Transfers Comments: max cueing for safety and sequencing of movements  Assessment        SLP  Current Diet : Regular  Current Liquid Diet : Thin  Diet Solids Recommendation: Regular  Liquid Consistency Recommendation: Thin    Body mass index is 30.05 kg/m². Assessment and Plan:  Acute ischemic infarct: ASA, statin.  PT/OT/SLP     Diabetes: control improving, Lantus 20, SSI;      HTN: propranolol 80; increased norvasc to 10     HLD: significantly elevated triglycerides, Lipitor 80     LEX: Push PO.  Patient prefers no IVF     Bowel: Per protocol  Bladder: Per protocol  Sleep: Trazodone PRN  Pain: tylenol, tramadol  DVT PPx: lovenox  CEICLY: 4/10 home w/ home health  DME: RAJESH Cooper - CNP  03/27/21  7:15 PM

## 2021-03-28 LAB
GLUCOSE BLD-MCNC: 106 MG/DL (ref 70–99)
GLUCOSE BLD-MCNC: 125 MG/DL (ref 70–99)
GLUCOSE BLD-MCNC: 145 MG/DL (ref 70–99)
GLUCOSE BLD-MCNC: 149 MG/DL (ref 70–99)
GLUCOSE BLD-MCNC: 150 MG/DL (ref 70–99)
PERFORMED ON: ABNORMAL

## 2021-03-28 PROCEDURE — 6360000002 HC RX W HCPCS: Performed by: PHYSICAL MEDICINE & REHABILITATION

## 2021-03-28 PROCEDURE — 1280000000 HC REHAB R&B

## 2021-03-28 PROCEDURE — 6370000000 HC RX 637 (ALT 250 FOR IP): Performed by: PHYSICAL MEDICINE & REHABILITATION

## 2021-03-28 RX ADMIN — PROPRANOLOL HYDROCHLORIDE 80 MG: 40 TABLET ORAL at 09:06

## 2021-03-28 RX ADMIN — ATORVASTATIN CALCIUM 80 MG: 80 TABLET, FILM COATED ORAL at 20:47

## 2021-03-28 RX ADMIN — AMLODIPINE BESYLATE 10 MG: 5 TABLET ORAL at 09:07

## 2021-03-28 RX ADMIN — INSULIN GLARGINE 20 UNITS: 100 INJECTION, SOLUTION SUBCUTANEOUS at 20:49

## 2021-03-28 RX ADMIN — ALLOPURINOL 300 MG: 300 TABLET ORAL at 09:06

## 2021-03-28 RX ADMIN — CETIRIZINE HYDROCHLORIDE 10 MG: 10 TABLET, FILM COATED ORAL at 09:07

## 2021-03-28 RX ADMIN — INSULIN LISPRO 2 UNITS: 100 INJECTION, SOLUTION INTRAVENOUS; SUBCUTANEOUS at 12:53

## 2021-03-28 RX ADMIN — INSULIN LISPRO 2 UNITS: 100 INJECTION, SOLUTION INTRAVENOUS; SUBCUTANEOUS at 17:18

## 2021-03-28 RX ADMIN — ASPIRIN 81 MG: 81 TABLET, CHEWABLE ORAL at 09:07

## 2021-03-28 RX ADMIN — Medication 5 MG: at 20:47

## 2021-03-28 RX ADMIN — INSULIN LISPRO 2 UNITS: 100 INJECTION, SOLUTION INTRAVENOUS; SUBCUTANEOUS at 09:09

## 2021-03-28 RX ADMIN — ENOXAPARIN SODIUM 40 MG: 40 INJECTION SUBCUTANEOUS at 09:05

## 2021-03-28 ASSESSMENT — PAIN SCALES - GENERAL: PAINLEVEL_OUTOF10: 0

## 2021-03-28 NOTE — PROGRESS NOTES
Patient states she feels weak and shaky as if her blood sugar is low, see lab resukts allowed to drink and eat

## 2021-03-28 NOTE — PLAN OF CARE
At risk for skin breakdown, see Oseas scale. Unable to repositin self in bed. Turn  and reposition every 2 hours. Heels elevated off bed. Protective barrier placed as needed. Patient kept clean and dry. Pillows used for positioning. Will continue to monitor for skin breakdown.

## 2021-03-29 LAB
ANION GAP SERPL CALCULATED.3IONS-SCNC: 10 MMOL/L (ref 3–16)
BUN BLDV-MCNC: 19 MG/DL (ref 7–20)
CALCIUM SERPL-MCNC: 10.6 MG/DL (ref 8.3–10.6)
CHLORIDE BLD-SCNC: 103 MMOL/L (ref 99–110)
CO2: 27 MMOL/L (ref 21–32)
CREAT SERPL-MCNC: 0.9 MG/DL (ref 0.6–1.2)
GFR AFRICAN AMERICAN: >60
GFR NON-AFRICAN AMERICAN: >60
GLUCOSE BLD-MCNC: 126 MG/DL (ref 70–99)
GLUCOSE BLD-MCNC: 130 MG/DL (ref 70–99)
GLUCOSE BLD-MCNC: 132 MG/DL (ref 70–99)
GLUCOSE BLD-MCNC: 135 MG/DL (ref 70–99)
GLUCOSE BLD-MCNC: 157 MG/DL (ref 70–99)
HCT VFR BLD CALC: 38.6 % (ref 36–48)
HEMOGLOBIN: 13.3 G/DL (ref 12–16)
MCH RBC QN AUTO: 27.9 PG (ref 26–34)
MCHC RBC AUTO-ENTMCNC: 34.5 G/DL (ref 31–36)
MCV RBC AUTO: 81 FL (ref 80–100)
PDW BLD-RTO: 14.6 % (ref 12.4–15.4)
PERFORMED ON: ABNORMAL
PLATELET # BLD: 141 K/UL (ref 135–450)
PMV BLD AUTO: 7.6 FL (ref 5–10.5)
POTASSIUM SERPL-SCNC: 3.4 MMOL/L (ref 3.5–5.1)
RBC # BLD: 4.76 M/UL (ref 4–5.2)
SODIUM BLD-SCNC: 140 MMOL/L (ref 136–145)
WBC # BLD: 4.1 K/UL (ref 4–11)

## 2021-03-29 PROCEDURE — 80048 BASIC METABOLIC PNL TOTAL CA: CPT

## 2021-03-29 PROCEDURE — 92507 TX SP LANG VOICE COMM INDIV: CPT

## 2021-03-29 PROCEDURE — 97116 GAIT TRAINING THERAPY: CPT

## 2021-03-29 PROCEDURE — 97530 THERAPEUTIC ACTIVITIES: CPT

## 2021-03-29 PROCEDURE — 1280000000 HC REHAB R&B

## 2021-03-29 PROCEDURE — 36415 COLL VENOUS BLD VENIPUNCTURE: CPT

## 2021-03-29 PROCEDURE — 6370000000 HC RX 637 (ALT 250 FOR IP): Performed by: PHYSICAL MEDICINE & REHABILITATION

## 2021-03-29 PROCEDURE — 6360000002 HC RX W HCPCS: Performed by: PHYSICAL MEDICINE & REHABILITATION

## 2021-03-29 PROCEDURE — 97110 THERAPEUTIC EXERCISES: CPT

## 2021-03-29 PROCEDURE — 97112 NEUROMUSCULAR REEDUCATION: CPT

## 2021-03-29 PROCEDURE — 85027 COMPLETE CBC AUTOMATED: CPT

## 2021-03-29 RX ADMIN — INSULIN GLARGINE 20 UNITS: 100 INJECTION, SOLUTION SUBCUTANEOUS at 20:28

## 2021-03-29 RX ADMIN — ENOXAPARIN SODIUM 40 MG: 40 INJECTION SUBCUTANEOUS at 07:57

## 2021-03-29 RX ADMIN — ATORVASTATIN CALCIUM 80 MG: 80 TABLET, FILM COATED ORAL at 20:25

## 2021-03-29 RX ADMIN — AMLODIPINE BESYLATE 10 MG: 5 TABLET ORAL at 07:57

## 2021-03-29 RX ADMIN — POTASSIUM BICARBONATE 20 MEQ: 782 TABLET, EFFERVESCENT ORAL at 20:25

## 2021-03-29 RX ADMIN — POTASSIUM BICARBONATE 20 MEQ: 782 TABLET, EFFERVESCENT ORAL at 12:34

## 2021-03-29 RX ADMIN — Medication 5 MG: at 20:25

## 2021-03-29 RX ADMIN — PROPRANOLOL HYDROCHLORIDE 80 MG: 40 TABLET ORAL at 07:57

## 2021-03-29 RX ADMIN — ALLOPURINOL 300 MG: 300 TABLET ORAL at 07:57

## 2021-03-29 RX ADMIN — TRAZODONE HYDROCHLORIDE 50 MG: 50 TABLET ORAL at 20:25

## 2021-03-29 RX ADMIN — CETIRIZINE HYDROCHLORIDE 10 MG: 10 TABLET, FILM COATED ORAL at 07:57

## 2021-03-29 RX ADMIN — INSULIN LISPRO 1 UNITS: 100 INJECTION, SOLUTION INTRAVENOUS; SUBCUTANEOUS at 20:28

## 2021-03-29 RX ADMIN — ASPIRIN 81 MG: 81 TABLET, CHEWABLE ORAL at 07:57

## 2021-03-29 ASSESSMENT — PAIN SCALES - GENERAL
PAINLEVEL_OUTOF10: 0
PAINLEVEL_OUTOF10: 2
PAINLEVEL_OUTOF10: 0

## 2021-03-29 ASSESSMENT — PAIN DESCRIPTION - ORIENTATION: ORIENTATION: RIGHT;LOWER

## 2021-03-29 NOTE — PROGRESS NOTES
Physical Therapy  Facility/Department: [facility]  Daily Treatment Note  NAME: Denisse Ellis  : 1950  MRN: [id number]    Date of Service: 3/29/2021    Discharge Recommendations:  24 hour supervision or assist, Home with Home health PT   PT Equipment Recommendations  Other: Continue to assess pending pt's progress. Potentially requiring RW with clamshell and manual w/c. Assessment   Body structures, Functions, Activity limitations: Decreased functional mobility ; Decreased endurance;Decreased ROM; Decreased balance;Decreased posture;Decreased coordination;Decreased strength;Decreased fine motor control; Increased pain  Assessment: Pt pleasant and agreeable to PT tx, denies c/o pain. Pt demonstrates bed mobility with SBA, t/f with CGA to Piotr without AD and with RW with clamshell and participates in dynamic neuro re-ed tasks with grossly modA. Pt limited by decreased activity tolerance and reports of R sided LBP. Pt will benefit from continued skilled PT in ARU to address above deficits, will continue progress mobility as tolerated. Treatment Diagnosis: impaired mobility  Prognosis: Good  Decision Making: Medium Complexity  PT Education: Goals;Gait Training;PT Role;Disease Specific Education;Plan of Care; Functional Mobility Training;Transfer Training;Home Exercise Program;Precautions;General Safety;Weight-bearing Education  Patient Education: Educated on upright posture, and core strengthening to improve balance. Pt verbalized understanding, but will benefit from additional education. Barriers to Learning: no  REQUIRES PT FOLLOW UP: Yes  Activity Tolerance  Activity Tolerance: Patient Tolerated treatment well;Patient limited by pain  Activity Tolerance: /78, HR 63, SpO2 98% on RA     Patient Diagnosis(es): There were no encounter diagnoses. has a past medical history of Diabetes mellitus ([address] GERD (gastroesophageal reflux disease), and Hypertension. has no past surgical history on file. through doorways and closer objects. Distance: 200'     Neuromuscular Education  Pt completes x 4 bout dynamic neuro re-ed task, first bout standing on blue air -ex with difficulty maintaining LLE positioning with assist from PT so subsequent bouts completed on level tile with RW. Pt completes x 2 bout reaching with LUE to grab bean bag to IL side then toss to target, x 2 reps repeated with RUE with promotion of WB on RW to LUE. Pt requires grossly modA for balance, blocking at L ankle to decrease inversion. Pt demonstrates intermittent L lateral lean with cues to correct. Seated rest break between bouts to recover from fatigue. Completed to improve proprioception, increase coordination and promote improve tone and strength through WB. Pt demonstrates no overt LOB. Balance  Posture: Fair  Sitting - Static: Good;-  Sitting - Dynamic: Fair;+  Standing - Static: Fair  Standing - Dynamic: Fair;-     Other exercises  Other exercises?: Yes  Other exercises 1: SCIFIT x 5 + 2 minutes level 1 with BUE and BLE to improve cardiorespiratory endurance and imprvoe gait through reciprocal UE and LE movements, pt maintains SPM > 50. LUE ace wrapped to maintain on handle and cues provided to maintain L hip adduction, seated rest break x 1 min to recover from fatigue                        Addendum: 2nd session  Pt seated in w/c with OT on arrival pleasant and agreeable to PT session, reports continued R sided LBP 2/10. Pt seen as co-tx with OT d/t complexity of condition, L sided deficits with decreased coordination and motor control. Pt benefits x 2 skilled hands to provide increased cues and feedback with gait and promote improved reciprocal pattern with improved safety. Transfers:   Sit to/from stand w/c to RW CGA/Piotr completed x 3 reps, cues for hand placement and sequence.   Ambulation:  Ambulation 1  Surface: level tile  Device: Rolling Walker  Other Apparatus: Wheelchair follow(ARMANDO becker), LLE AFO  Assistance: modA x 2  Quality of Gait: Step to/partial step through gait pattern, leading with LLE, ataxic gait with decreased coordination and motor planning LLE. Decreased stance time on the left and ankle inversion at times. LLE genu recurvatuum. Pt mildly unsteady no overt LOB. Gait Deviations: Slow Lisa;Decreased step length;Decreased step height;Shuffles;Staggers;Decreased head and trunk rotation  Distance: 36' + 35' x 2  Comments: Decreased LLE noted with increased fatigue requiring cues for pt to take seated rest break. OT providing tactile cues at trunk and assistance for balance, PT on L seated on stool using UE to prevent LLE hyperextension and buckling in stance, VC provided to maintain slight knee flexion. Pt seated in recliner at end of session with chair alarm in place and all needs within reach. Goals  Short term goals  Time Frame for Short term goals: 1 week, 3/26/21  Short term goal 1: Pt will complete bed mobility with SBA and no rails. -- GOAL PARTIALLY MET 3/27: Supine to sit SBA with HOB elevated and BR. Short term goal 2: Pt will complete sit<>stand with Min A with LRAD. -- GOAL MET Piotr to CGA with RW  Short term goal 3: Pt will ambulate 100 feet with LRAD and Mod Ax1. -- GOAL NOT MET but progressing Piotr x 2 up to 48' with RW  Short term goal 4: Pt will complete bed<>chair transfer with LRAD and Min A. -- GOAL MET Piotr to CGA with RW  Short term goal 5: Pt will complete 150 feet of w/c mobility with multiple turns and IND. 3/26: 150 feet with SBA  Long term goals  Time Frame for Long term goals : 2 weeks, by 4/2/21  Long term goal 1: Pt will complete bed mobility with independence in flat bed without rails. Long term goal 2: Pt will complete sit<>stands with independence and LRAD. Long term goal 3: Pt will complete bed<>Chair transfer with LRAD and independence. Long term goal 4: Pt will ambulate 100 feet with LRAD and independence.   Long term goal 5: Pt will ascend/descend 4 steps with

## 2021-03-29 NOTE — PROGRESS NOTES
Cynthia Esteban  3/29/2021  [id number]    Chief Complaint: Acute ischemic stroke ([address]    Subjective:   Resting in bed this AM. No issues overnight. No new c/o. ROS: no n/v, cp, sob, f/c  Objective:  Patient Vitals for the past 24 hrs:   BP Temp Temp src Pulse Resp SpO2   03/29/21 0755 137/78 97.6 °F (36.4 °C) Oral 63 16 98 %   03/28/21 2300 (!) 140/78 97.8 °F (36.6 °C) Oral 61 16 99 %   03/28/21 0900 135/78 97.8 °F (36.6 °C) Oral 63 16 97 %     Gen: No distress, pleasant. HEENT: Normocephalic, atraumatic. CV: Regular rate and rhythm. Resp: No respiratory distress. Abd: Soft, nontender   Ext: No edema. Neuro: Alert, oriented, appropriately interactive. Wt Readings from Last 3 Encounters:   03/28/21 142 lb 6.7 oz (64.6 kg)   03/16/21 162 lb (73.5 kg)   07/14/20 158 lb 14.4 oz (72.1 kg)       Laboratory data:   Lab Results   Component Value Date    WBC 4.1 03/29/2021    HGB 13.3 03/29/2021    HCT 38.6 03/29/2021    MCV 81.0 03/29/2021     03/29/2021       Lab Results   Component Value Date     03/29/2021    K 3.4 03/29/2021    K 3.4 07/14/2020     03/29/2021    CO2 27 03/29/2021    BUN 19 03/29/2021    CREATININE 0.9 03/29/2021    GLUCOSE 126 03/29/2021    CALCIUM 10.6 03/29/2021        Therapy progress:  PT  Position Activity Restriction  Other position/activity restrictions: up with assist  Objective     Sit to Stand: Contact guard assistance  Stand to sit: Contact guard assistance  Bed to Chair: Minimal assistance, Moderate assistance  Device: Rolling Walker  Other Apparatus: Wheelchair follow(ARMANDO becker)  Assistance: Minimal assistance, 2 Person assistance(Piotr x 2)  Distance: 13' + 48' + 25'  OT  PT Equipment Recommendations  Other: Continue to assess pending pt's progress. Potentially requiring RW with clamshell and manual w/c.   Toilet - Technique: Stand pivot  Equipment Used: Grab bars  Toilet Transfers Comments: max cueing for safety and sequencing of movements Assessment        SLP  Current Diet : Regular  Current Liquid Diet : Thin  Diet Solids Recommendation: Regular  Liquid Consistency Recommendation: Thin    Body mass index is 27.81 kg/m². Assessment and Plan:  Acute ischemic infarct: ASA, statin.  PT/OT/SLP     Diabetes: control improving, Lantus 20, SSI;      HTN: propranolol 80; increased norvasc to 10     HLD: significantly elevated triglycerides, Lipitor 80     LEX: Push PO.  Patient prefers no IVF     Bowel: Per protocol  Bladder: Per protocol  Sleep: Trazodone PRN  Pain: tylenol, tramadol  DVT PPx: lovenox  CECILY: 4/10 home w/ home health  DME: MIMI Rogers MD  03/29/21  8:42 AM

## 2021-03-29 NOTE — FLOWSHEET NOTE
03/29/21 1500   Encounter Summary   Services provided to: Patient   Referral/Consult From: [facility]   (3/29 follow up support)   Complexity of Encounter Low   Length of Encounter 15 minutes   Spiritual/Spiritism   Type Spiritual support   Assessment Approachable   Intervention Active listening;Explored feelings, thoughts, concerns   Outcome Expressed gratitude

## 2021-03-29 NOTE — PROGRESS NOTES
[facility]: ACUTE REHAB UNIT  SPEECH-LANGUAGE PATHOLOGY      [x] Daily  [] Weekly Care Conference Note  [] Discharge    [address] [facility]      NUY:[birth year]14  SLR:[id number]  Rehab Dx/Hx: Acute ischemic stroke ([facility] [address] [I63.9]    Precautions: falls  Home situation: lives independently, manages all household tasks, active    ST Dx: [] Aphasia  [x] Dysarthria  [] Apraxia   [] Oropharyngeal dysphagia [] Cognitive Impairment  [] Other:   Date of Admit: 3/19/2021  Room #: 7716/2183-87    Current functional status (updated daily):         Pt being seen for : [x] Speech/Language Treatment  [] Dysphagia Treatment [] Cognitive Treatment  [] Other:  Communication: []WFL  [] Aphasia  [x] Dysarthria  [] Apraxia  [] Pragmatic Impairment [] Non-verbal  [] Hearing Loss  [] Other:   Cognition: [x] WFL  [] Mild  [] Moderate  [] Severe [] Unable to Assess  [] Other:  Memory: [x] WFL  [] Mild  [] Moderate  [] Severe [] Unable to Assess  [] Other:  Behavior: [x] Alert  [x] Cooperative  [x]  Pleasant  [] Confused  [] Agitated  [] Uncooperative  [] Distractible [] Motivated  [] Self-Limiting [] Anxious  [] Other:  Endurance:  [x] Adequate for participation in SLP sessions  [] Reduced overall  [] Lethargic  [] Other:  Safety: [x] No concerns at this time  [] Reduced insight into deficits  []  Reduced safety awareness [] Not following call light procedures  [] Unable to Assess  [] Other:    Current Diet Order:DIET GENERAL; Carb Control: 4 carb choices (60 gms)/meal  Dietary Nutrition Supplements: Diabetic Oral Supplement  Swallowing Precautions: Sit up for all meals and thereafter for 30 minutes, Eat with small bites (1/2 tsp; 1 tsp), Alternate solids with liquids and Take small sips of water at the end of snacks and meals        Date: 3/29/2021      Tx session 1  [id number] Tx session 2  All tx needs met session 1   Total Timed Code Min 0 0   Total Treatment Minutes 30 0   Individual Treatment Minutes 30 0   Group Treatment Minutes 0 0 speech tasks today. - Pt achieved 71-75dB during conversation and structured speech tasks today       Other areas targeted: N/A      Education:   SLP reviewed compensatory speech strategies for improved speech intelligibility, rationale for completion of today's tasks. Safety Devices: [x] Call light within reach  [] Chair alarm activated  [x] Bed alarm activated  [] Other: [] Call light within reach  [] Chair alarm activated  [] Bed alarm activated  [] Other:    Assessment: Pt pleasant and cooperative, agreeable to participate. Pt stated her  and son reported a positive difference it pt's vocal quality, increased volume and no slurring. Pt was happy with their comments. Pt completed two sets of five trials for MPT, averaging 8.2 seconds the first trial and 16.08 seconds the second trial. Pt completed 20 diaphragmatic breathing exercises. Pt also completed respiration exercises of sentences with increasing length demonstrating with 100% intelligibility, cues for adequate breath support and pause breaks. Pt averaged 71-74dB during conversation and structured speech tasks today. Pt remains strongly motivated to improved and is making consistent gains and progress towards goals. Continue goals as listed above. Plan: Continue as per plan of care. Additional Information: N/A    Barriers toward progress: None   Discharge recommendations:  [] Home independently  [x] Home with assistance []  24 hour supervision  [] ECF [x] Other:  See PT/OT recs for more detail. Continued Tx Upon Discharge: ? [x] Yes [] No [] TBD based on progress while on ARU [] Vital Stim indicated [] Other:   Estimated discharge date: 4/10/2021  Interventions used this date:  [] Speech/Language Treatment  [] Instruction in HEP [] Group [] Dysphagia Treatment [] Cognitive Treatment   [] Other:       Total Time Breakdown / Charges    Time in Time out Total Time / units   Cognitive Tx -- -- --   Speech Tx 1400 1430 30 min / 1 unit

## 2021-03-30 LAB
GLUCOSE BLD-MCNC: 126 MG/DL (ref 70–99)
GLUCOSE BLD-MCNC: 127 MG/DL (ref 70–99)
GLUCOSE BLD-MCNC: 166 MG/DL (ref 70–99)
GLUCOSE BLD-MCNC: 173 MG/DL (ref 70–99)
PERFORMED ON: ABNORMAL

## 2021-03-30 PROCEDURE — 6370000000 HC RX 637 (ALT 250 FOR IP): Performed by: PHYSICAL MEDICINE & REHABILITATION

## 2021-03-30 PROCEDURE — 6360000002 HC RX W HCPCS: Performed by: PHYSICAL MEDICINE & REHABILITATION

## 2021-03-30 PROCEDURE — 97110 THERAPEUTIC EXERCISES: CPT

## 2021-03-30 PROCEDURE — 97535 SELF CARE MNGMENT TRAINING: CPT

## 2021-03-30 PROCEDURE — 1280000000 HC REHAB R&B

## 2021-03-30 PROCEDURE — 97112 NEUROMUSCULAR REEDUCATION: CPT

## 2021-03-30 PROCEDURE — 97116 GAIT TRAINING THERAPY: CPT

## 2021-03-30 PROCEDURE — 92507 TX SP LANG VOICE COMM INDIV: CPT

## 2021-03-30 PROCEDURE — 97530 THERAPEUTIC ACTIVITIES: CPT

## 2021-03-30 RX ORDER — BUSPIRONE HYDROCHLORIDE 5 MG/1
5 TABLET ORAL 3 TIMES DAILY
Status: DISCONTINUED | OUTPATIENT
Start: 2021-03-30 | End: 2021-04-10 | Stop reason: HOSPADM

## 2021-03-30 RX ADMIN — ALLOPURINOL 300 MG: 300 TABLET ORAL at 09:03

## 2021-03-30 RX ADMIN — ASPIRIN 81 MG: 81 TABLET, CHEWABLE ORAL at 09:03

## 2021-03-30 RX ADMIN — INSULIN GLARGINE 20 UNITS: 100 INJECTION, SOLUTION SUBCUTANEOUS at 20:53

## 2021-03-30 RX ADMIN — ATORVASTATIN CALCIUM 80 MG: 80 TABLET, FILM COATED ORAL at 20:53

## 2021-03-30 RX ADMIN — Medication 5 MG: at 20:52

## 2021-03-30 RX ADMIN — BUSPIRONE HYDROCHLORIDE 5 MG: 5 TABLET ORAL at 09:03

## 2021-03-30 RX ADMIN — POTASSIUM BICARBONATE 20 MEQ: 782 TABLET, EFFERVESCENT ORAL at 09:03

## 2021-03-30 RX ADMIN — ENOXAPARIN SODIUM 40 MG: 40 INJECTION SUBCUTANEOUS at 09:03

## 2021-03-30 RX ADMIN — TRAZODONE HYDROCHLORIDE 50 MG: 50 TABLET ORAL at 20:52

## 2021-03-30 RX ADMIN — PROPRANOLOL HYDROCHLORIDE 80 MG: 40 TABLET ORAL at 09:03

## 2021-03-30 RX ADMIN — AMLODIPINE BESYLATE 10 MG: 5 TABLET ORAL at 09:03

## 2021-03-30 RX ADMIN — BUSPIRONE HYDROCHLORIDE 5 MG: 5 TABLET ORAL at 13:55

## 2021-03-30 RX ADMIN — INSULIN LISPRO 2 UNITS: 100 INJECTION, SOLUTION INTRAVENOUS; SUBCUTANEOUS at 11:27

## 2021-03-30 RX ADMIN — INSULIN LISPRO 1 UNITS: 100 INJECTION, SOLUTION INTRAVENOUS; SUBCUTANEOUS at 20:54

## 2021-03-30 RX ADMIN — BUSPIRONE HYDROCHLORIDE 5 MG: 5 TABLET ORAL at 20:52

## 2021-03-30 RX ADMIN — CETIRIZINE HYDROCHLORIDE 10 MG: 10 TABLET, FILM COATED ORAL at 09:03

## 2021-03-30 ASSESSMENT — PAIN SCALES - GENERAL
PAINLEVEL_OUTOF10: 0
PAINLEVEL_OUTOF10: 0

## 2021-03-30 NOTE — PATIENT CARE CONFERENCE
[address]  Inpatient Rehabilitation  Weekly Team Conference Note    Date: 3/31/2021  Patient Name: Kevin Mccain        MRN: [id number]    : 1950  (69 y.o.)  Gender: female   Referring Practitioner: Verlinda Aschoff, MD  Diagnosis: acute left sided weakness      Interventions to be utilized toward barriers to discharge, per discipline:  [address] observed barriers to dc: Decreased endurance, Upper extremity weakness and Lower extremity weakness  Nursing interventions:assist with ADL's, toileting, and medication management  Family Education: no  Fall Risk:  Yes      Physical therapy observed barriers to dc:     Baseline: Lives with spouse, one level home, ramped entrance, I without AD              Current level: Piotr to SBA bed mobility, CGA to Piotr t/f, Piotr to modA x 2 gait with RW              Barriers to DC: L bisi, decreased coordination/motor planning, decreased strength, decreased activity tolerance, LBP              Needs in order to achieve dc home/next level of care: Likely needs to be CGA to MI with functional mobility and gait vs w/c, has ramped entrance. Would recommend family training with pt's  prior to d/c. Physical therapy interventions:   Current Treatment Recommendations: Strengthening, Neuromuscular Re-education, Home Exercise Program, ROM, Balance Training, Endurance Training, Functional Mobility Training, Transfer Training, Gait Training, Stair training, Wheelchair Mobility Training, Safety Education & Training, Patient/Caregiver Education & Training, Equipment Evaluation, Education, & procurement      PHYSICAL THERAPY  [employer] last conference:      [employer] current conference:       PT Equipment Recommendations  Equipment Needed: Yes  Mobility Devices: Niles Renee Wheelchair  Walker: Rolling(With L clamshell)  Wheelchair: Standard(with elevating and removable leg rests and swing away armrests)  Other: Continue to assess pending pt's progress.  Potentially requiring RW with clamshell and manual w/c. Assessment: Pt pleasant and agreeable to PT tx, reports some LBP but does not provide a pain score. Pt participate in functional t/f without AD and in // bars with grossly CGA to Piotr. Pt participates in dynamic neuro re-ed tasks in // bars to promote improved coordination and motor control to LLE and promote improved hip flexion for improved clearance with gait. Pt is limited by decreased activity tolerance. Pt will benefit from continued skilled PT in ARU to address above deficits, will continue to progress mobility as tolerated. Occupational therapy observed barriers to dc:    Baseline: IND with ADL/IADL, functional mobility/transfers without device    Current level: CGA-Mod A stand/stand-step transfers to either side, improved level of assistance with use of grab bar, Min A LE ADL, Mod A toileting (assist for clothing management)    Barriers to DC: LUE/LLE control, balance, L side awareness    Needs in order to achieve dc home/next level of care: CGA-Min A for functional transfers, BADL, will have assistance from  for all IADL;  Unsure if wanting to do home health OT or outpatient OT     Occupational Therapy interventions:  Current Treatment Recommendations: Balance Training, Functional Mobility Training, Safety Education & Training, Positioning, Neuromuscular Re-education, Self-Care / ADL, Strengthening, Gait Training, Patient/Caregiver Education & Training, Stair training, ROM, Equipment Evaluation, Education, & procurement, Home Management Training, Endurance Training, Pain Management, Wheelchair Mobility Training, Cognitive/Perceptual Training, Cognitive Reorientation, Modalities (comment)      OCCUPATIONAL THERAPY    Assessment: Pt continues to be very pleasant and motivated to participate in therapy, continues to require up to min A for functional transfers, with good recall of most education provided in prior sessions related to adaptive ADL techniques and transfer techniques/wheelchair placement. Pt continues to be limited by LUE control for opening items for ADL and for bimanual tasks, and requires up to Mod A for standing-level LE ADL d/t L lateral lean. Continue OT tx. Speech therapy observed barriers to dc:    Baseline: pt lives at home with , retired, active , manages own meds              Current level: mild dysarthria, WFL cog              Barriers to DC: None               Needs in order to achieve dc home/next level of care: carryover of speech strategies for improved intelligibility, improved vocal volume and breath support      Speech Therapy interventions:  Dysphagia: N/A  Speech/Language/Cognition: speech strategies for improved intelligibility, breath support exercises, vocal strategies and exercises       SPEECH THERAPY:  Pt alert and cooperative, agreeable to participate in tx. SLP is a new listener to pt, and pt 100% intelligible to SLP. Pt able to recall purpose of completing diaphragmatic breathing exercises. Pt averaged 14.8 seconds with sustained /ah/ with adequate control of breath, but decreased vocal intensity. Pt able to indep recall SLOB speech strategies, and implemented throughout session. Pt required min cues to slow rate and for pause breaks when reading multiple sentences as well. Pt's dB SPL ranged from 55-75 throughout session, averaging 65 dB. Pt continues to be motivated to participate. Continue goals above. NUTRITION  Weight: 142 lb 6.7 oz (64.6 kg) / Body mass index is 27.81 kg/m². Diet Order: DIET GENERAL; Carb Control: 4 carb choices (60 gms)/meal  Dietary Nutrition Supplements: Diabetic Oral Supplement  PO Meals Eaten (%): 51 - 75%  Education: Completed       CASE MANAGEMENT  Assessment: Pt plan is to DC home with her spouse.           Interdisciplinary Goals:   1.) Pt will complete LE dressing with CGA/setup, use of grab bars as needed  2.) Pt will complete carryover use of speech

## 2021-03-30 NOTE — PLAN OF CARE
Problem: Pain:  Goal: Pain level will decrease  Description: Pain level will decrease  Outcome: Ongoing  Goal: Control of acute pain  Description: Control of acute pain  Outcome: Ongoing  Goal: Control of chronic pain  Description: Control of chronic pain  Outcome: Ongoing     Problem: Skin Integrity:  Goal: Will show no infection signs and symptoms  Description: Will show no infection signs and symptoms  Outcome: Ongoing  Goal: Absence of new skin breakdown  Description: Absence of new skin breakdown  Outcome: Ongoing     Problem: Falls - Risk of:  Goal: Will remain free from falls  Description: Will remain free from falls  3/30/2021 1024 by Eufemia Dawn RN  Outcome: Ongoing  3/29/2021 2311 by Hanna Shell RN  Outcome: Ongoing  3/29/2021 2304 by Hanna Shell RN  Outcome: Ongoing  Goal: Absence of physical injury  Description: Absence of physical injury  Outcome: Ongoing     Problem: Nutrition  Goal: Optimal nutrition therapy  Outcome: Ongoing

## 2021-03-30 NOTE — PROGRESS NOTES
Yes  Patient assessed for rehabilitation services?: Yes  Response to previous treatment: Patient with no complaints from previous session  Family / Caregiver Present: No  Referring Practitioner: Dean Pa MD  Diagnosis: CVA with Left hemiparesis  Subjective  Subjective: Pt in bed on arrival, very pleasant and agreeable to treatment, requesting to shower  General Comment  Comments: RN cleared pt for OT; pt resting in bed, agreeable to therapy  Vital Signs  Patient Currently in Pain: Denies   Orientation  Orientation  Overall Orientation Status: Within Functional Limits  Objective    ADL  Feeding: Modified independent ; Increased time to complete  Grooming: Supervision(seated at sink w/c level)  UE Bathing: Supervision;Setup  LE Bathing: Setup;Minimal assistance  UE Dressing: Minimal assistance;Setup(for bra and to doff/don shirt)  LE Dressing: Minimal assistance;Setup(underwear, pants, socks)  Toileting:  Moderate assistance(assist for clothing management upon sitting)        Balance  Sitting Balance: Supervision  Standing Balance: Minimal assistance  Standing Balance  Time: 1-2 minutes, ~1 minute, <1 minute over multiple trials  Activity: transfers, standing ADL  Functional Mobility  Functional - Mobility Device: Wheelchair  Activity: To/from bathroom  Assist Level: Supervision  Toilet Transfers  Toilet - Technique: Stand step  Equipment Used: Grab bars  Toilet Transfer: Minimal assistance  Bed mobility  Supine to Sit: Stand by assistance(HOB elevated, to L side and weightbearing forearm-->extended arm LUE without cues)  Sit to Supine: Unable to assess(Pt left up in chair)  Scooting: Stand by assistance  Transfers  Stand Step Transfers: Minimal assistance  Stand Pivot Transfers: Contact guard assistance  Sit to stand: Contact guard assistance  Stand to sit: Contact guard assistance  Transfer Comments: Pt did not require cues for hand placement or w/c placement to R or L side for pivot/stand step transfers Coordination  Fine Motor: Pt continues to be limited by LUE control, able to open items with compensatory strategies or increased time/effort for bimanual tasks     Cognition  Overall Cognitive Status: [facility]         Second session:   Pt up in chair on arrival, pleasant and agreeable to treatment, denied pain. Pt completed pivot transfer to L with Min A initially, rest of transfers CGA-Min A during session up to walker or mat. Pt completed x10 BUE partial push ups on exercise ball placed on mat in stance with CGA, horizontal abduction/adduction and rotation at shoulder x10 + 10 each UE with up to Min A for standing balance, x10 forward/backward rolls BUE. Also assisted pt with donning RICK hose this session, swelling noted more LLE vs RLE. Pt ambulated >30 ft with Min A x2, with PT facilitating LLE control and OT facilitating posture and balance. Pt continues to require cues and assist for walker management with turning. Pt left up in chair with PT for session.        Plan   Plan  Times per week: 5-7 days/ wk  Times per day: Daily  Plan weeks: 2 weeks  Current Treatment Recommendations: Balance Training, Functional Mobility Training, Safety Education & Training, Positioning, Neuromuscular Re-education, Self-Care / ADL, Strengthening, Gait Training, Patient/Caregiver Education & Training, Stair training, ROM, Equipment Evaluation, Education, & procurement, Home Management Training, Endurance Training, Pain Management, Wheelchair Mobility Training, Cognitive/Perceptual Training, Cognitive Reorientation, Modalities (comment)    Goals  Short term goals  Time Frame for Short term goals: 1 week (3-26-21)  Short term goal 1: Pt will complete functional transfers mod A - GOAL MET 3/25 mod A  Short term goal 2: Pt will complete UBD using bisi-techniques mod A - GOAL MET 3/25 mod A  Short term goal 3: min assist with LUE self ROM/positioning - GOAL MET 3/29  Short term goal 4: Pt will complete toileting mod A - GOAL MET 3/29 Long term goals  Time Frame for Long term goals : 2 weeks (4-2-21)  Long term goal 1: Pt will complete functional transfers SBA  Long term goal 2: Pt will complete UBD using bisi-techniques SBA  Long term goal 3: Pt will complete toileting CGA  Patient Goals   Patient goals : \"get stronger so I can get back to my normal routines\"       Therapy Time   Individual Concurrent Group Co-treatment   Time In 0800         Time Out 0900         Minutes 60         Timed Code Treatment Minutes: 60 Minutes     Second Session Therapy Time:   Individual Concurrent Group Co-treatment   Time In      1230   Time Out      1300   Minutes      30     Timed Code Treatment Minutes:  30    Total Treatment Minutes:  90    Curiel Anna OTR/L

## 2021-03-30 NOTE — PROGRESS NOTES
Co-Treat Minutes 0 0   Variance/Reason:  0 N/A   Pain None reported. Pain Intervention [] RN notified  [] Repositioned  [] Intervention offered and patient declined  [x] N/A  [] Other: [] RN notified  [] Repositioned  [] Intervention offered and patient declined  [] N/A  [] Other:   Subjective     Pt alert and cooperative, sitting upright in bedside chair for session. Pt agreeable to participate. Objective:  Timeframe for Short-term Goals: 10 days by 3/30/21       Speech/ Dysarthria Goals:  Goal 1: Patient will be able to demonstrate appropriate diaphragmatic breathing and completion of exercises for speech production with min cues. Goal not directly targeted. Pt indep recalled exercise and purpose. Goal 2: Patient will increase maximum phonation time to 12 seconds. Sustained /ah/  -5 trials: 12 seconds, 15 seconds, 13 seconds, 14 seconds, and 20 seconds   -pt averaged 14.8 seconds       Goal 3: Patient will demonstrate use of speech compensatory stategies with 90% accuracy   Pt indep recalled SLOB speech strategies 4/4      Goal 4: Patient will demonstrate adeqaute breath support with 95% accuracy for the  sentence level. Pt read multiple sentences in a row (for increased complexity of task ) with adequate breath support with 95% acc. Pt required min cues for pause breaks and to slow rate. Goal 5. The patient will say single sentences as 65 dB SPL on average, with min cues.     Used dB meter with structured speech tasks    Pt read multiple sentences in a row (for increased complexity of task ), and averaged 65 dB during task      Other areas targeted: N/A      Education:   SLP edu pt re: role of SLP, rationale of tx tasks, speech strategies, improved intelligibility to new listener    Safety Devices: [x] Call light within reach  [x] Chair alarm activated  [] Bed alarm activated  [] Other: [] Call light within reach  [] Chair alarm activated  [] Bed alarm activated  [] Other: Assessment: Pt alert and cooperative, agreeable to participate in tx. SLP is a new listener to pt, and pt 100% intelligible to SLP. Pt able to recall purpose of completing diaphragmatic breathing exercises. Pt averaged 14.8 seconds with sustained /ah/ with adequate control of breath, but decreased vocal intensity. Pt able to indep recall SLOB speech strategies, and implemented throughout session. Pt required min cues to slow rate and for pause breaks when reading multiple sentences as well. Pt's dB SPL ranged from 55-75 throughout session, averaging 65 dB. Pt continues to be motivated to participate. Continue goals above. Plan: Continue as per plan of care. Additional Information: N/A    Barriers toward progress: None   Discharge recommendations:  [] Home independently  [x] Home with assistance []  24 hour supervision  [] ECF [x] Other:  See PT/OT recs for more detail. Continued Tx Upon Discharge: ? [x] Yes [] No [] TBD based on progress while on ARU [] Vital Stim indicated [] Other:   Estimated discharge date: 4/10/2021  Interventions used this date:  [x] Speech/Language Treatment  [] Instruction in HEP [] Group [] Dysphagia Treatment [] Cognitive Treatment   [] Other:       Total Time Breakdown / Charges    Time in Time out Total Time / units   Cognitive Tx -- -- --   Speech Tx 0900 0930 30 min / 1 unit   Dysphagia Tx -- -- --       Electronically Signed by  Yumi Jaramillo MA [facility]-SLP #[id number]  Speech Language Pathologist

## 2021-03-30 NOTE — PROGRESS NOTES
Physical Therapy  Facility/Department: [facility]  Daily Treatment Note  NAME: Kevin Mccain  : 1950  MRN: [id number]    Date of Service: 3/30/2021    Discharge Recommendations:  24 hour supervision or assist, Home with Home health PT   PT Equipment Recommendations  Equipment Needed: Yes  Mobility Devices: Jolaine Thelma; Wheelchair  Walker: Rolling(With L clamshell)  Wheelchair: Standard(with elevating and removable leg rests and swing away armrests)    Assessment   Body structures, Functions, Activity limitations: Decreased functional mobility ; Decreased endurance;Decreased ROM; Decreased balance;Decreased posture;Decreased coordination;Decreased strength;Decreased fine motor control; Increased pain  Assessment: Pt pleasant and agreeable to PT tx, reports some LBP but does not provide a pain score. Pt participate in functional t/f without AD and in // bars with grossly CGA to Piotr. Pt participates in dynamic neuro re-ed tasks in // bars to promote improved coordination and motor control to LLE and promote improved hip flexion for improved clearance with gait. Pt is limited by decreased activity tolerance. Pt will benefit from continued skilled PT in ARU to address above deficits, will continue to progress mobility as tolerated. Treatment Diagnosis: impaired mobility  Prognosis: Good  Decision Making: Medium Complexity  PT Education: Goals;Gait Training;PT Role;Disease Specific Education;Plan of Care; Functional Mobility Training;Transfer Training;Home Exercise Program;Precautions;General Safety;Weight-bearing Education  Patient Education: Educated on upright posture, and core strengthening to improve balance. Pt verbalized understanding, but will benefit from additional education. Barriers to Learning: no  REQUIRES PT FOLLOW UP: Yes  Activity Tolerance  Activity Tolerance: Patient Tolerated treatment well;Patient limited by pain     Patient Diagnosis(es): There were no encounter diagnoses.      has a past medical history of Diabetes mellitus ([facility] [address] GERD (gastroesophageal reflux disease), and Hypertension. has no past surgical history on file. Restrictions  Restrictions/Precautions  Restrictions/Precautions: General Precautions, Fall Risk  Position Activity Restriction  Other position/activity restrictions: up with assist  Subjective   General  Chart Reviewed: Yes  Response To Previous Treatment: Patient with no complaints from previous session. Family / Caregiver Present: No  Referring Practitioner: Ana Laura Huston MD  Subjective  Subjective: Pt seated in recliner on approach, pleasant and agreeable to PT tx, reports soreness to lower back but does not provide a pain score  Pain Screening  Patient Currently in Pain: Yes  Vital Signs  Patient Currently in Pain: Yes       Orientation  Orientation  Overall Orientation Status: Within Normal Limits    Objective   Bed mobility  Supine to Sit: Unable to assess  Sit to Supine: Unable to assess  Comment: Pt seated in recliner at start and end of session     Transfers  Sit to Stand: Contact guard assistance;Minimal Assistance  Stand to sit: Contact guard assistance;Minimal Assistance  Comment: SPT recliner to w/c no AD CGA/Piotr, sit to/from stand w/c to // bars CGA completed x 6 reps, intermittent cues for hand placement and [place] assist for LUE     Ambulation  Ambulation?: No  More Ambulation?: No  Wheelchair Activities  Left Brakes Level of Assistance: Stand by assistance  Right Brakes Level of Assistance: Stand by Assist  Propulsion: Yes  Propulsion 1  Propulsion: Manual  Level: Level Tile  Level of Assistance: Stand by assistance  Description/ Details: Completed w/c mobility with occasional cues for propulsion through doorways and closer objects. Distance: 200'     Neuromuscular Education  AFO donned LLE and ace wrap to LLE to provide proprioceptive feedback and decrease hyperextension in stance.  Mirror placed in front of pt to provide visual feedback and intermittent cues to maintain upright and midline positioning. 1 Lb weight donned to LLE to provide increased proprioceptive feedback and improve motor control. Pt completes x 2 set x 10 reps B step forwards/backwards to target with BUE support in // bars Piotr for balance and intermittent Piotr to promote improved L hip/knee flexion and extension with step and maintain neutral knee position in stance. Seated rest break between bouts to recover. Pt completes x 2 set x 10 reps LLE step up to 2\" step with BUE support in // bars with Piotr for balance and Piotr to increase L hip/knee flexion for improved toe clearance. Seated rest breaks between bouts. Completed to improve LLE motor planning and control and promote improved tone through WB and improve coordination. Addendum: 2nd session  Pt seen for initial 30 minute co-tx with OT d/t complexity of condition, decreased activity tolerance and decreased motor planning and coordination in LLE and LUE. Pt benefits from x 2 skilled hands to maximize cues and feedback for improved safety and performance with functional mobility. Co-tx with focus on standing activities with WB to BLE/BUE as well as gait activities. L AFO and hyperextension ace wrap donned for mobility. Individual session with focus on functional t/f and SCIFIT to improve coordination and strength and improve safety with mobility. Bed mobility:  Sit to supine with HOB flat and BR Piotr for BLE and cues for sequence. ModA x 2 to scoot to [facility] with bridge and LLE blocked  Transfers:  SPT recliner to w/c no AD Piotr  Sit to/from stand w/c no AD infront of high-low table Piotr completed x 3 reps, PT assisting to provide blocking and tactile cues to LLE to improve positoning, OT assisting with balance. X 4 reps SPT w/c to/from EOM with RW Piotr/modA with maximal cues for sequence and safety and cues to maintain RW closer to COM. Increased time to complete with poor sequencing noted.   SPT EOM to w/c Piotr  SPT w/c to/from SCIFIT Piotr  SPT w/c to recliner Piotr  Cues provided for hand placement and safety. Increased time to complete SPT with RW with decreased sequencing. Gait:  Surface: level tile  Device: Rolling Walker  Other Apparatus: Wheelchair follow(FRANCHESKAE clamshell), LLE AFO, LLE hyperextension ace wrap and 1.5 lb ankle weight  Assistance: Piotr x 2  Quality of Gait: Step to/partial step through gait pattern, leading with LLE, ataxic gait with decreased coordination and motor planning LLE. Decreased stance time on the left and ankle inversion at times. LLE genu recurvatuum. Pt mildly unsteady no overt LOB. Gait Deviations: Slow Lisa;Decreased step length;Decreased step height;Shuffles;Staggers;Decreased head and trunk rotation  Distance: 40'  Comments: Pt with improved L knee positioning with decreased hyperextension this date, intermittent Piotr to prevent in stance. OT providing tactile cues at trunk and assistance for balance, PT on L seated on stool providing tactile cues and intermittent blocking at L knee. Pt with improved gait pattern with compensatory strategies, cues for upright posture. Seated rest break to recover from fatigue. Balance:  Pt standing at elevated high-low table with BUE on orange therapy ball and Piotr x 2 for balance:   X 20 reps BUE push up on ball   X 20 reps forwards/backwards roll with BUE on ball   X 20 reps R/L roll with BUE on ball. Pt providing intermittent assistance with cues at LLE to facilitate neutral ankle and knee positioning and maintain hip extension. OT assisting with UE to promote WB to LUE and dynamic reaching. Completed to improve tone and motor control through WB and increase strength. Seated rest break between bouts. Therapeutic Exercise:  Pt completes x 8 minutes on SCIFIT level 1 with BUE/BLE to improve cardiorespiratory endurance and improve gait through reciprocal UE and LE movements. Pt requires seated rest break at 6 minutes to recover from fatigue.  ARMANDO ace wrap in hand rest and orange TB around BLE to provide proprioceptive feedback and decrease L hip ER and abduction, pt continues to require intermittent tactile and verbal cues to promote neutral hip position. Pt supine in bed at end of session with bed alarm in place and all needs within reach. Goals  Short term goals  Time Frame for Short term goals: 1 week, 3/26/21  Short term goal 1: Pt will complete bed mobility with SBA and no rails. -- GOAL PARTIALLY MET 3/27: Supine to sit SBA with HOB elevated and BR. Short term goal 2: Pt will complete sit<>stand with Min A with LRAD. -- GOAL MET Piotr to CGA with RW  Short term goal 3: Pt will ambulate 100 feet with LRAD and Mod Ax1. -- GOAL NOT MET but progressing Piotr x 2 up to 48' with RW  Short term goal 4: Pt will complete bed<>chair transfer with LRAD and Min A. -- GOAL MET Piotr to CGA with RW  Short term goal 5: Pt will complete 150 feet of w/c mobility with multiple turns and IND. 3/26: 150 feet with SBA  Long term goals  Time Frame for Long term goals : 2 weeks, by 4/2/21  Long term goal 1: Pt will complete bed mobility with independence in flat bed without rails. Long term goal 2: Pt will complete sit<>stands with independence and LRAD. Long term goal 3: Pt will complete bed<>Chair transfer with LRAD and independence. Long term goal 4: Pt will ambulate 100 feet with LRAD and independence. Long term goal 5: Pt will ascend/descend 4 steps with bilateral rails and CGA. Patient Goals   Patient goals : To get stronger and go home.     Plan    Plan  Times per week: 5 out of 7  Times per day: Daily  Plan weeks: 2 weeks, by 4/2/21  Current Treatment Recommendations: Strengthening, Neuromuscular Re-education, Home Exercise Program, ROM, Balance Training, Endurance Training, Functional Mobility Training, Transfer Training, Gait Training, Stair training, Wheelchair Mobility Training, Safety Education & Training, Patient/Caregiver Education & Training, Equipment Evaluation, Education, & procurement  Safety Devices  Type of devices:  All fall risk precautions in place, Nurse notified, Gait belt, Patient at risk for falls, Call light within reach, Left in chair, Chair alarm in place     Therapy Time   Individual Concurrent Group Co-treatment   Time In 1000         Time Out 1030         Minutes 30         Timed Code Treatment Minutes: 30 Minutes     Second Session Therapy Time:   Individual Concurrent Group Co-treatment   Time In 1300     1230   Time Out 1330     1300   Minutes 30     30     Timed Code Treatment Minutes:  60 minutes    Total Treatment Minutes:  90 minutes    Ajit Luke, PT, DPT Length To Time In Minutes Device Was In Place: 10

## 2021-03-30 NOTE — PROGRESS NOTES
Denisse Flies  3/30/2021  [id number]    Chief Complaint: Acute ischemic stroke [facility])    Subjective:   No issues overnight; resting in bed. No new c/o. ROS: no n/v, cp, sob, f/c  Objective:  Patient Vitals for the past 24 hrs:   BP Temp Temp src Pulse Resp SpO2   03/29/21 2025 (!) 146/75 97.8 °F (36.6 °C) Oral 64 16 100 %   03/29/21 0755 137/78 97.6 °F (36.4 °C) Oral 63 16 98 %     Gen: No distress, pleasant. HEENT: Normocephalic, atraumatic. CV: Regular rate and rhythm. Resp: No respiratory distress. Abd: Soft, nontender   Ext: No edema. Neuro: Alert, oriented, appropriately interactive. Wt Readings from Last 3 Encounters:   03/28/21 142 lb 6.7 oz (64.6 kg)   03/16/21 162 lb (73.5 kg)   07/14/20 158 lb 14.4 oz (72.1 kg)       Laboratory data:   Lab Results   Component Value Date    WBC 4.1 03/29/2021    HGB 13.3 03/29/2021    HCT 38.6 03/29/2021    MCV 81.0 03/29/2021     03/29/2021       Lab Results   Component Value Date     03/29/2021    K 3.4 03/29/2021    K 3.4 07/14/2020     03/29/2021    CO2 27 03/29/2021    BUN 19 03/29/2021    CREATININE 0.9 03/29/2021    GLUCOSE 126 03/29/2021    CALCIUM 10.6 03/29/2021        Therapy progress:  PT  Position Activity Restriction  Other position/activity restrictions: up with assist  Objective     Sit to Stand: Contact guard assistance, Minimal Assistance  Stand to sit: Contact guard assistance, Minimal Assistance  Bed to Chair: Minimal assistance, Moderate assistance  Device: Rolling Walker  Other Apparatus: Wheelchair follow(ARMANDO becker)  Assistance: Minimal assistance, 2 Person assistance(Piotr x 2)  Distance: 13' + 48' + 25'  OT  PT Equipment Recommendations  Other: Continue to assess pending pt's progress. Potentially requiring RW with clamshell and manual w/c.   Toilet - Technique: Stand pivot  Equipment Used: Lulb bars  Toilet Transfers Comments: max cueing for safety and sequencing of movements  Assessment        SLP  Current Diet : Regular  Current Liquid Diet : Thin  Diet Solids Recommendation: Regular  Liquid Consistency Recommendation: Thin    Body mass index is 27.81 kg/m². Assessment and Plan:  Acute ischemic infarct: ASA, statin.  PT/OT/SLP     Diabetes: control improving, Lantus 20, SSI;      HTN: propranolol 80; increased norvasc to 10     HLD: significantly elevated triglycerides, Lipitor 80     LEX: Push PO.  Patient prefers no IVF     Bowel: Per protocol  Bladder: Per protocol  Sleep: Trazodone PRN  Pain: tylenol, tramadol  DVT PPx: lovenox  CECILY: 4/10 home w/ home health  DME: MIMI Salvador MD  03/30/21  7:39 AM

## 2021-03-31 LAB
ANION GAP SERPL CALCULATED.3IONS-SCNC: 9 MMOL/L (ref 3–16)
BUN BLDV-MCNC: 23 MG/DL (ref 7–20)
CALCIUM SERPL-MCNC: 11.1 MG/DL (ref 8.3–10.6)
CHLORIDE BLD-SCNC: 107 MMOL/L (ref 99–110)
CO2: 27 MMOL/L (ref 21–32)
CREAT SERPL-MCNC: 1.1 MG/DL (ref 0.6–1.2)
GFR AFRICAN AMERICAN: 59
GFR NON-AFRICAN AMERICAN: 49
GLUCOSE BLD-MCNC: 114 MG/DL (ref 70–99)
GLUCOSE BLD-MCNC: 118 MG/DL (ref 70–99)
GLUCOSE BLD-MCNC: 127 MG/DL (ref 70–99)
GLUCOSE BLD-MCNC: 143 MG/DL (ref 70–99)
GLUCOSE BLD-MCNC: 206 MG/DL (ref 70–99)
HCT VFR BLD CALC: 39.4 % (ref 36–48)
HEMOGLOBIN: 13.6 G/DL (ref 12–16)
MCH RBC QN AUTO: 28.3 PG (ref 26–34)
MCHC RBC AUTO-ENTMCNC: 34.5 G/DL (ref 31–36)
MCV RBC AUTO: 81.9 FL (ref 80–100)
PDW BLD-RTO: 14.4 % (ref 12.4–15.4)
PERFORMED ON: ABNORMAL
PLATELET # BLD: 130 K/UL (ref 135–450)
PMV BLD AUTO: 7.7 FL (ref 5–10.5)
POTASSIUM SERPL-SCNC: 3.8 MMOL/L (ref 3.5–5.1)
RBC # BLD: 4.81 M/UL (ref 4–5.2)
SODIUM BLD-SCNC: 143 MMOL/L (ref 136–145)
WBC # BLD: 4 K/UL (ref 4–11)

## 2021-03-31 PROCEDURE — 6370000000 HC RX 637 (ALT 250 FOR IP): Performed by: PHYSICAL MEDICINE & REHABILITATION

## 2021-03-31 PROCEDURE — 97530 THERAPEUTIC ACTIVITIES: CPT

## 2021-03-31 PROCEDURE — 6360000002 HC RX W HCPCS: Performed by: PHYSICAL MEDICINE & REHABILITATION

## 2021-03-31 PROCEDURE — 97112 NEUROMUSCULAR REEDUCATION: CPT

## 2021-03-31 PROCEDURE — 1280000000 HC REHAB R&B

## 2021-03-31 PROCEDURE — 92507 TX SP LANG VOICE COMM INDIV: CPT

## 2021-03-31 PROCEDURE — 36415 COLL VENOUS BLD VENIPUNCTURE: CPT

## 2021-03-31 PROCEDURE — 97535 SELF CARE MNGMENT TRAINING: CPT

## 2021-03-31 PROCEDURE — 85027 COMPLETE CBC AUTOMATED: CPT

## 2021-03-31 PROCEDURE — 97116 GAIT TRAINING THERAPY: CPT

## 2021-03-31 PROCEDURE — 97110 THERAPEUTIC EXERCISES: CPT

## 2021-03-31 PROCEDURE — 80048 BASIC METABOLIC PNL TOTAL CA: CPT

## 2021-03-31 RX ORDER — ATORVASTATIN CALCIUM 80 MG/1
80 TABLET, FILM COATED ORAL DAILY
Qty: 30 TABLET | Refills: 0 | DISCHARGE
Start: 2021-03-31

## 2021-03-31 RX ADMIN — INSULIN LISPRO 2 UNITS: 100 INJECTION, SOLUTION INTRAVENOUS; SUBCUTANEOUS at 11:27

## 2021-03-31 RX ADMIN — ENOXAPARIN SODIUM 40 MG: 40 INJECTION SUBCUTANEOUS at 07:43

## 2021-03-31 RX ADMIN — Medication 5 MG: at 20:27

## 2021-03-31 RX ADMIN — ASPIRIN 81 MG: 81 TABLET, CHEWABLE ORAL at 07:43

## 2021-03-31 RX ADMIN — BUSPIRONE HYDROCHLORIDE 5 MG: 5 TABLET ORAL at 07:43

## 2021-03-31 RX ADMIN — ATORVASTATIN CALCIUM 80 MG: 80 TABLET, FILM COATED ORAL at 20:27

## 2021-03-31 RX ADMIN — BUSPIRONE HYDROCHLORIDE 5 MG: 5 TABLET ORAL at 15:06

## 2021-03-31 RX ADMIN — CETIRIZINE HYDROCHLORIDE 10 MG: 10 TABLET, FILM COATED ORAL at 07:43

## 2021-03-31 RX ADMIN — ACETAMINOPHEN 650 MG: 325 TABLET ORAL at 02:24

## 2021-03-31 RX ADMIN — TRAZODONE HYDROCHLORIDE 50 MG: 50 TABLET ORAL at 20:27

## 2021-03-31 RX ADMIN — AMLODIPINE BESYLATE 10 MG: 5 TABLET ORAL at 07:44

## 2021-03-31 RX ADMIN — INSULIN LISPRO 2 UNITS: 100 INJECTION, SOLUTION INTRAVENOUS; SUBCUTANEOUS at 20:28

## 2021-03-31 RX ADMIN — INSULIN GLARGINE 20 UNITS: 100 INJECTION, SOLUTION SUBCUTANEOUS at 20:27

## 2021-03-31 RX ADMIN — BUSPIRONE HYDROCHLORIDE 5 MG: 5 TABLET ORAL at 20:27

## 2021-03-31 RX ADMIN — ALLOPURINOL 300 MG: 300 TABLET ORAL at 07:43

## 2021-03-31 RX ADMIN — PROPRANOLOL HYDROCHLORIDE 80 MG: 40 TABLET ORAL at 07:44

## 2021-03-31 ASSESSMENT — PAIN SCALES - GENERAL
PAINLEVEL_OUTOF10: 0
PAINLEVEL_OUTOF10: 8

## 2021-03-31 ASSESSMENT — PAIN DESCRIPTION - LOCATION: LOCATION: BACK

## 2021-03-31 ASSESSMENT — PAIN DESCRIPTION - ORIENTATION: ORIENTATION: LOWER

## 2021-03-31 NOTE — CARE COORDINATION
Writer met with patient to discuss disposition of care conference with interdisciplinary team on   3/31/2021            Discussed:  Pt progress with therapy and training with therapy. Pt spouse will be in next week [facility] noon therapy updated    Recommendations: home with Olamidenahid Sebastian    Anticipated discharge date: 4/10/21      Patient verbalized understanding of recommendations and anticipated discharge date.    CRISTIAN Low

## 2021-03-31 NOTE — PROGRESS NOTES
Occupational Therapy  Facility/Department: [facility]  Daily Treatment Note  NAME: Mignon Wyatt  : 1950  MRN: [id number]    Date of Service: 3/31/2021    Discharge Recommendations:  24 hour supervision or assist, Outpatient OT, Continue to assess pending progress  OT Equipment Recommendations  Other: CTA pending progress    Assessment   Performance deficits / Impairments: Decreased functional mobility ; Decreased endurance;Decreased coordination;Decreased ADL status; Decreased posture;Decreased ROM; Decreased balance;Decreased strength;Decreased safe awareness;Decreased high-level IADLs;Decreased cognition;Decreased vision/visual deficit; Decreased fine motor control  Assessment: Pt continues to be very pleasant and motivated to participate in therapy, continues to require up to min A for functional transfers, with good recall of most education provided in prior sessions related to adaptive ADL techniques and transfer techniques/wheelchair placement. Pt continues to be limited by LUE control for opening items for ADL and for bimanual tasks, and requires up to Mod A for standing-level LE ADL d/t L lateral lean. Continue OT tx.  OT Education: OT Role;Plan of Care;Transfer Training;ADL Adaptive Strategies; Equipment; Energy Conservation;Precautions;Orientation  Patient Education: Importance of mobility and engaging LUE, LUE positioning  REQUIRES OT FOLLOW UP: Yes  Activity Tolerance  Activity Tolerance: Patient Tolerated treatment well  Activity Tolerance: /86, HR 62, Spo2 95% on RA  Safety Devices  Safety Devices in place: Yes  Type of devices: Call light within reach;Gait belt;Nurse notified; Left in chair;Chair alarm in place         Patient Diagnosis(es): There were no encounter diagnoses. has a past medical history of Diabetes mellitus ([facility] [address] GERD (gastroesophageal reflux disease), and Hypertension. has no past surgical history on file.     Restrictions  Restrictions/Precautions Restrictions/Precautions: General Precautions, Fall Risk  Position Activity Restriction  Other position/activity restrictions: up with assist  Subjective   General  Chart Reviewed: Yes  Patient assessed for rehabilitation services?: Yes  Response to previous treatment: Patient with no complaints from previous session  Family / Caregiver Present: No  Referring Practitioner: Steve Jennings MD  Diagnosis: CVA with Left hemiparesis  Subjective  Subjective: Pt in bed on arrival, very pleasant and agreeable to treatment, requesting to get cleaned up and change clothes  General Comment  Comments: RN cleared pt for OT; pt resting in bed, agreeable to therapy  Pain Assessment  Pain Assessment: 0-10  Pain Level: 5  Pain Type: Chronic pain  Pain Location: Sacrum  Pain Descriptors: Aching;Constant  Non-Pharmaceutical Pain Intervention(s): Repositioned;Rest;Ambulation/Increased Activity  Response to Pain Intervention: Patient Satisfied  Vital Signs  Patient Currently in Pain: Yes   Orientation  Orientation  Overall Orientation Status: Within Functional Limits  Objective    ADL  Feeding: Modified independent ; Increased time to complete  Grooming: Supervision; Increased time to complete(seated w/c level at sink)  UE Bathing: Supervision;Setup  LE Bathing: Moderate assistance;Setup;Verbal cueing; Increased time to complete  UE Dressing: Minimal assistance;Setup; Increased time to complete  LE Dressing: Moderate assistance;Setup;Verbal cueing; Increased time to complete(assist for pulling pants/brief up/down over hips, pt able to thread with Min A to thread LLE through pants, partially d/t non-skid socks donned)        Balance  Sitting Balance: Supervision  Standing Balance:  Moderate assistance(attempted bimanual tasks in stance, pt able to feel L lateral lean, difficulty correcting without physical assist)  Standing Balance  Time: 1-2 minutes, <1 minutes  Activity: mobility, transfers, standing ADL  Functional Mobility  Functional - Mobility Device: Wheelchair  Activity: To/from bathroom; Other  Assist Level: Supervision  Bed mobility  Supine to Sit: Stand by assistance(HOB elevated, use of bedrail, increased time to complete)  Sit to Supine: Unable to assess(pt left up in recliner)  Scooting: Stand by assistance  Transfers  Stand Step Transfers: Contact guard assistance;Minimal assistance  Stand Pivot Transfers: Contact guard assistance  Sit Pivot Transfers: Minimal assistance;Contact guard assistance  Sit to stand: Minimal assistance;Contact guard assistance  Stand to sit: Contact guard assistance        Coordination  Gross Motor: Use of BUE to manipulate washcloths in circular pattern, x10 elbow flexion/ext + shoulder rotation/abduction/adduction, x10 shoulder flexion table slides; Pt with difficulty completing task BUE simultaneously              Cognition  Overall Cognitive Status: WFL        Second Session:   Pt in bed on arrival, pleasant and agreeable to treatment, reporting mild back/sacral pain and L side feeling especially weak this date. Pt completed functional transfers with CGA-Min A, and functional mobility with Min-Mod A x2 with RW. Pt completed bringing rings over arc with RUE in stance with up to Min A x2, L lateral lean increasing with fatigue, pt brought across midline without difficulty. Pt able to bring 2 rings to middle of lower arc with LUE with increased time/effort and modified/gross grasp and back to L side. During co-tx, PT facilitating LLE placement and control, OT facilitating standing balance and UE placement/coordination. Pt left up with PT for session.      Plan   Plan  Times per week: 5-7 days/ wk  Times per day: Daily  Plan weeks: 2 weeks  Current Treatment Recommendations: Balance Training, Functional Mobility Training, Safety Education & Training, Positioning, Neuromuscular Re-education, Self-Care / ADL, Strengthening, Gait Training, Patient/Caregiver Education & Training, Stair training, ROM, Equipment Evaluation, Education, & procurement, Home Management Training, Endurance Training, Pain Management, Wheelchair Mobility Training, Cognitive/Perceptual Training, Cognitive Reorientation, Modalities (comment)    Goals  Short term goals  Time Frame for Short term goals: 1 week (3-26-21)  Short term goal 1: Pt will complete functional transfers mod A - GOAL MET 3/25 mod A  Short term goal 2: Pt will complete UBD using bisi-techniques mod A - GOAL MET 3/25 mod A  Short term goal 3: min assist with LUE self ROM/positioning - GOAL MET 3/29  Short term goal 4: Pt will complete toileting mod A - GOAL MET 3/29  Long term goals  Time Frame for Long term goals : 2 weeks (4-2-21)  Long term goal 1: Pt will complete functional transfers SBA  Long term goal 2: Pt will complete UBD using bisi-techniques SBA  Long term goal 3: Pt will complete toileting CGA  Patient Goals   Patient goals : \"get stronger so I can get back to my normal routines\"       Therapy Time   Individual Concurrent Group Co-treatment   Time In 0800         Time Out 0900         Minutes 60         Timed Code Treatment Minutes: 60 Minutes     Second Session Therapy Time:   Individual Concurrent Group Co-treatment   Time In      1230   Time Out      1300   Minutes      30     Timed Code Treatment Minutes:  30    Total Treatment Minutes:  238 ROSEANNA Guillen/L

## 2021-03-31 NOTE — PROGRESS NOTES
[facility]: ACUTE REHAB UNIT  SPEECH-LANGUAGE PATHOLOGY      [x] Daily  [] Weekly Care Conference Note  [] Discharge    [address] [place]      TIJ:[id number]  [facility]:[id number]  Rehab Dx/Hx: Acute ischemic stroke ([facility] [address] [I63.9]    Precautions: falls  Home situation: lives independently, manages all household tasks, active    ST Dx: [] Aphasia  [x] Dysarthria  [] Apraxia   [] Oropharyngeal dysphagia [] Cognitive Impairment  [] Other:   Date of Admit: 3/19/2021  Room #: 3561/5483-40    Current functional status (updated daily):         Pt being seen for : [x] Speech/Language Treatment  [] Dysphagia Treatment [] Cognitive Treatment  [] Other:  Communication: []WFL  [] Aphasia  [x] Dysarthria  [] Apraxia  [] Pragmatic Impairment [] Non-verbal  [] Hearing Loss  [] Other:   Cognition: [x] WFL  [] Mild  [] Moderate  [] Severe [] Unable to Assess  [] Other:  Memory: [x] WFL  [] Mild  [] Moderate  [] Severe [] Unable to Assess  [] Other:  Behavior: [x] Alert  [x] Cooperative  [x]  Pleasant  [] Confused  [] Agitated  [] Uncooperative  [] Distractible [] Motivated  [] Self-Limiting [] Anxious  [] Other:  Endurance:  [x] Adequate for participation in SLP sessions  [] Reduced overall  [] Lethargic  [] Other:  Safety: [x] No concerns at this time  [] Reduced insight into deficits  []  Reduced safety awareness [] Not following call light procedures  [] Unable to Assess  [] Other:    Current Diet Order:DIET GENERAL; Carb Control: 4 carb choices (60 gms)/meal  Dietary Nutrition Supplements: Diabetic Oral Supplement  Swallowing Precautions: Sit up for all meals and thereafter for 30 minutes, Eat with small bites (1/2 tsp; 1 tsp), Alternate solids with liquids and Take small sips of water at the end of snacks and meals        Date: 3/31/2021      Tx session 1  0900 - 0930 Tx session 2  All tx needs met session 1   Total Timed Code Min 0 0   Total Treatment Minutes 30 0   Individual Treatment Minutes 30 0   Group Treatment Minutes 0 0 Co-Treat Minutes 0 0   Variance/Reason:  0 N/A   Pain None reported. Pain Intervention [] RN notified  [] Repositioned  [] Intervention offered and patient declined  [x] N/A  [] Other: [] RN notified  [] Repositioned  [] Intervention offered and patient declined  [] N/A  [] Other:   Subjective     Pt alert and cooperative, sitting upright in bedside chair for session. Pt agreeable to participate. Objective:  Timeframe for Short-term Goals: 10 days by 3/30/21       Speech/ Dysarthria Goals:  Goal 1: Patient will be able to demonstrate appropriate diaphragmatic breathing and completion of exercises for speech production with min cues. Pt completed x20 with min cues provided. Goal 2: Patient will increase maximum phonation time to 12 seconds. Sustained /ah/  -First set of 5 trials: average of 10.6 secs  -second set of 5 trials: average of 14.2 seconds   -pt with improved breath support and use of diaphragmatic breathing and increased vocal volume on second set of MPT      Goal 3: Patient will demonstrate use of speech compensatory stategies with 90% accuracy   Pt indep recalled SLOB speech strategies 4/4    Pt able to self correct and independently carries over use of speech strategies for improved intelligibility. Goal met 03/31/21      Goal 4: Patient will demonstrate adeqaute breath support with 95% accuracy for the  sentence level. Pt completed respiration exercises and sentences of increasing length:  -pt completed with average of 69-72dB  -pt demonstrating with improved use of natural pausing, breath support, and increased vocal volume       Goal 5. The patient will say single sentences as 65 dB SPL on average, with min cues. See goal 4 above. Pt averaged 69-72dB during structured speech tasks and within conversation.        Other areas targeted: N/A      Education:   edu provided re: speech strategies, diaphragmatic breathing exercises     Safety Devices: [x] Call light within reach  [x] Chair alarm activated  [] Bed alarm activated  [] Other: [] Call light within reach  [] Chair alarm activated  [] Bed alarm activated  [] Other:    Assessment: Pt pleasant and cooperative, agreeable to participate. Pt has met speech intelligibility goal as pt is self correcting and demonstrating independent carryover and use of speech strategies. Pt's overall speech intelligibility judged to be [facility] throughout entire duration of tx session. Pt with average 69-72dB during structured speech tasks and within conversation today. Pt remains strongly motivated to improve. Pt is making consistent progress towards goals. Continue goals as listed above. Plan: Continue as per plan of care. Additional Information: N/A    Barriers toward progress: None   Discharge recommendations:  [] Home independently  [x] Home with assistance []  24 hour supervision  [] ECF [x] Other:  See PT/OT recs for more detail. Continued Tx Upon Discharge: ? [x] Yes [] No [] TBD based on progress while on ARU [] Vital Stim indicated [] Other:   Estimated discharge date: 4/10/2021  Interventions used this date:  [x] Speech/Language Treatment  [] Instruction in HEP [] Group [] Dysphagia Treatment [] Cognitive Treatment   [] Other: Total Time Breakdown / Charges    Time in Time out Total Time / units   Cognitive Tx -- -- --   Speech Tx 0900 0930 30 min / 1 unit   Dysphagia Tx -- -- --       Electronically Signed by  Luisa Trivedi. A CCC-SLP  Speech-Language Pathologist  [id number]

## 2021-03-31 NOTE — PROGRESS NOTES
Comprehensive Nutrition Assessment    Type and Reason for Visit:  Reassess    Nutrition Recommendations/Plan:   1. Continue carb control diet   2. Discontinue Glucerna - monitor need/acceptance of other ONS  3. Encourage PO and protein intakes for healing and weight maintenance   4. Monitor nutrition adequacy, pertinent labs, bowel habits, wt changes, and clinical progress    Nutrition Assessment:  Follow up: Pt remains nutritionally at risk AEB weight loss, 9 lb in 1 week. Pt reports improving appetite and PO intakes improving per EMR. No c/o constipation today. Pt reports intolerance to current ONS, decline others. Encouraged continued good PO intakes for healing and weight maintenance. No further nutrition questions or concerns at this time. Malnutrition Assessment:  Malnutrition Status: At risk for malnutrition (Comment)    Context:  Acute Illness       Estimated Daily Nutrient Needs:  Energy (kcal):  1593-1729kcal; Weight Used for Energy Requirements:  Ideal(45.5kg)     Protein (g):  55-68g; Weight Used for Protein Requirements:  Ideal(1.2-1.5)        Fluid (ml/day):   ; Method Used for Fluid Requirements:  1 ml/kcal      Nutrition Related Findings:  Active BS. BM x2 on 3/30 per pt. Wounds:  (redness on buttocks, scattered abrasions)       Current Nutrition Therapies:    DIET GENERAL; Carb Control: 4 carb choices (60 gms)/meal  Dietary Nutrition Supplements: Diabetic Oral Supplement    Anthropometric Measures:  · Height: 5' (152.4 cm)  · Current Body Weight: 142 lb (64.4 kg)   · Admission Body Weight: 153 lb (69.4 kg)    · Ideal Body Weight: 100 lbs;   · BMI: 27.7  · BMI Categories: Overweight (BMI 25.0-29. 9)       Nutrition Diagnosis:   · Inadequate oral intake related to inadequate protein-energy intake as evidenced by intake 26-50%, weight loss      Nutrition Interventions:   Food and/or Nutrient Delivery:  Continue Current Diet, Discontinue Oral Nutrition Supplement  Nutrition Education/Counseling:  Education completed   Coordination of Nutrition Care:  Continue to monitor while inpatient    Goals:  Patient will eat 50% or greater of meals.        Nutrition Monitoring and Evaluation:   Behavioral-Environmental Outcomes:  Knowledge or Skill   Food/Nutrient Intake Outcomes:  Food and Nutrient Intake, Supplement Intake  Physical Signs/Symptoms Outcomes:  Biochemical Data, Nutrition Focused Physical Findings, Weight, GI Status, Constipation     Discharge Planning:    Continue current diet     Electronically signed by Cory Camargo MS, RD, LD on 3/31/21 at 1:31 PM EDT    Contact: [phone]

## 2021-03-31 NOTE — PROGRESS NOTES
Rajendra Clamp  3/31/2021  [id number]    Chief Complaint: Acute ischemic stroke ([address]    Subjective:   Resting in bed this morning. No issues overnight. Denies pain. ROS: no n/v, cp, sob, f/c  Objective:  Patient Vitals for the past 24 hrs:   BP Temp Temp src Pulse Resp SpO2   03/31/21 0730 (!) 140/86 98.2 °F (36.8 °C) Oral 62 16 95 %   03/30/21 2045 129/75 97.7 °F (36.5 °C) Oral 64 16 96 %   03/30/21 0901 119/74 97.9 °F (36.6 °C) Oral 70 16 96 %     Gen: No distress, pleasant. HEENT: Normocephalic, atraumatic. CV: Regular rate and rhythm. Resp: No respiratory distress. Abd: Soft, nontender   Ext: No edema. Neuro: Alert, oriented, appropriately interactive.    Wt Readings from Last 3 Encounters:   03/28/21 142 lb 6.7 oz (64.6 kg)   03/16/21 162 lb (73.5 kg)   07/14/20 158 lb 14.4 oz (72.1 kg)       Laboratory data:   Lab Results   Component Value Date    WBC 4.0 03/31/2021    HGB 13.6 03/31/2021    HCT 39.4 03/31/2021    MCV 81.9 03/31/2021     (L) 03/31/2021       Lab Results   Component Value Date     03/31/2021    K 3.8 03/31/2021    K 3.4 07/14/2020     03/31/2021    CO2 27 03/31/2021    BUN 23 03/31/2021    CREATININE 1.1 03/31/2021    GLUCOSE 118 03/31/2021    CALCIUM 11.1 03/31/2021        Therapy progress:  PT  Position Activity Restriction  Other position/activity restrictions: up with assist  Objective     Sit to Stand: Contact guard assistance, Minimal Assistance  Stand to sit: Contact guard assistance, Minimal Assistance  Bed to Chair: Minimal assistance, Moderate assistance  Device: Rolling Walker  Other Apparatus: Wheelchair follow(ARMANDO becker)  Assistance: Minimal assistance, 2 Person assistance(Piotr x 2)  Distance: 13' + 48' + 25'  OT  PT Equipment Recommendations  Equipment Needed: Yes  Mobility Devices: Alivia [place], Wheelchair  Walker: Rolling(With L clamshell)  Wheelchair: Standard(with elevating and removable leg rests and swing away armrests)  Other: Continue to

## 2021-03-31 NOTE — PLAN OF CARE
Fall precautions in place, bed alarm on, nonskid foot wear applied, bed in lowest position, and call light within reach.

## 2021-03-31 NOTE — PROGRESS NOTES
Physical Therapy  Facility[facility]Department[facility]  Daily Treatment Note  NAME: Nicky Linares  : 1950  MRN: [id number]    Date of Service: 3/31/2021    Discharge Recommendations:  24 hour supervision or assist, Home with Home health PT   PT Equipment Recommendations  Equipment Needed: Yes  Mobility Devices: Aliya Nicole; Wheelchair  Walker: Rolling(With L clamshell)  Wheelchair: Standard(with elevating and removable leg rests and swing away armrests)    Assessment   Body structures, Functions, Activity limitations: Decreased functional mobility ; Decreased endurance;Decreased ROM; Decreased balance;Decreased posture;Decreased coordination;Decreased strength;Decreased fine motor control; Increased pain  Assessment: Pt is progressing well with treatment. Pt seen for co-tx with OT to progress ambulation with RW, and dynamic standing balance. Individual session focused on seated ther ex for placement of BLE on the ground and stepping onto block, w/c mobility, and SPT. Final session focused on supine ther ex and additional SPT. Pt demos improved ROM and strength with left LE and trunk control with all ther ex this date. Pt will benefit from continued skilled PT to address deficits above. Continue ARU PT POC. Treatment Diagnosis: impaired mobility  Prognosis: Good  PT Education: Goals;Gait Training;PT Role;Disease Specific Education;Plan of Care; Functional Mobility Training;Transfer Training;Home Exercise Program;Precautions;General Safety;Weight-bearing Education  Patient Education: Educated knee position to decrease exaggerated knee flexion in standing or hyperextension. Pt verbalized understanding, but will benefit from additional education. Barriers to Learning: no  REQUIRES PT FOLLOW UP: Yes  Activity Tolerance  Activity Tolerance: Patient Tolerated treatment well  Activity Tolerance: No SOB or pain observed. Patient Diagnosis(es): There were no encounter diagnoses.      has a past medical history of Diabetes mellitus ([address] GERD (gastroesophageal reflux disease), and Hypertension. has no past surgical history on file. Restrictions  Restrictions/Precautions  Restrictions/Precautions: General Precautions, Fall Risk  Position Activity Restriction  Other position/activity restrictions: up with assist  Subjective   General  Chart Reviewed: Yes  Response To Previous Treatment: Patient with no complaints from previous session. Family / Caregiver Present: No  Referring Practitioner: Dean Pa MD  Subjective  Subjective: Pt supine in bed at beginning of each session this date. Pt reports improved pain this date. General Comment  Comments: Pt resting in bed upon entry, RN cleared pt for therapy  Pain Screening  Patient Currently in Pain: Denies  Vital Signs  Patient Currently in Pain: Denies       Orientation  Orientation  Overall Orientation Status: Within Normal Limits  Cognition   Cognition  Overall Cognitive Status: WFL  Arousal/Alertness: Appropriate responses to stimuli  Following Commands: Follows one step commands consistently  Attention Span: Attends with cues to redirect  Memory: Appears intact  Safety Judgement: Decreased awareness of need for assistance  Problem Solving: Assistance required to generate solutions  Insights: Decreased awareness of deficits  Initiation: Requires cues for some  Sequencing: Requires cues for some  Objective   Bed mobility  Supine to Sit: Stand by assistance  Sit to Supine: Stand by assistance  Comment: Slow to complete, but able to complete with HOB flat and bed rails used. Transfers  Sit to Stand: Contact guard assistance;Minimal Assistance  Stand to sit: Contact guard assistance;Minimal Assistance  Bed to Chair: Minimal assistance; Moderate assistance  Stand Pivot Transfers: Minimal Assistance;Contact guard assistance  Comment: SPT bed<>BSC no AD CGA/Piotr, sit to/from stand w/c to RW Min Ax1-2 completed x 10 reps, intermittent cues for hand placement  Ambulation Ambulation?: Yes  More Ambulation?: No  Ambulation 1  Surface: level tile  Device: Rolling Walker  Other Apparatus: Wheelchair follow(ARMANDO becker)  Assistance: Minimal assistance;2 Person assistance  Quality of Gait: Step to gait pattern, leading with LLE, ataxic gait with decreased coordination and motor planning LLE. Decreased stance time on the left. BLE genu recurvatuum at times. Pt mildly unsteady no overt LOB. Gait Deviations: Slow Lisa;Decreased step length;Decreased step height;Shuffles;Staggers;Decreased head and trunk rotation  Distance: 75 feet  Wheelchair Activities  Propulsion: Yes  Propulsion 1  Propulsion: Manual  Level: Level Tile  Method: RLE;RUE  Level of Assistance: Stand by assistance  Description/ Details: Completed w/c mobility with occasional cues for propulsion through doorways and closer objects. Cues for left LE progression d/t no foot rest- pt having difficultly d/t fatigue. Distance: 150 feet  Neuromuscular Education  Neuromuscular Comments: TKE in standing with blue TB x12, hip ADD with blue TB x12, leg press with blue TB with knee control x12, step up onto 4\" step in sitting for foot placement anterior and laterally x12 BLE  Balance  Posture: Fair  Sitting - Static: Good;-  Sitting - Dynamic: Fair;+  Standing - Static: Fair;+  Standing - Dynamic: Fair  Comments: Dynamic standing with OT faciliating trunk control and BUE movement with PT focusing on left knee position and weight shifting; Pt tolerated well with seated breaks inbetween ~10-15 minutes. Exercises  Bridging: Other(comment)(x12 BLE, or right LE, or left LE)  Straight Leg Raise: x12 BLE  Quad Sets: x12 BLE  Heelslides: x12 BLE  Hip Abduction: x12 BLE, 2 sets, one with knees in flexion, one with knees in extension  Ankle Pumps: x12 BLE, partial ROM on the left LE.   Core Strengthening: x10 partial sit ups, x10 partial sit ups to the right, x10 parital sit ups to the left- pt demos improved ROM in bed this date with hands clasped. Comments: Cues for sequencing and technique, extended time required. Pt demos better control of the left LE with all ther ex this date. Goals  Short term goals  Time Frame for Short term goals: 1 week, 3/26/21  Short term goal 1: Pt will complete bed mobility with SBA and no rails. -- GOAL PARTIALLY MET 3/27: Supine to sit SBA with HOB elevated and BR. Short term goal 2: Pt will complete sit<>stand with Min A with LRAD. -- GOAL MET Piotr to CGA with RW  Short term goal 3: Pt will ambulate 100 feet with LRAD and Mod Ax1. -- GOAL NOT MET but progressing Piotr x 2 up to 48' with RW  Short term goal 4: Pt will complete bed<>chair transfer with LRAD and Min A. -- GOAL MET Piotr to CGA with RW  Short term goal 5: Pt will complete 150 feet of w/c mobility with multiple turns and IND. 3/26: 150 feet with SBA  Long term goals  Time Frame for Long term goals : 2 weeks, by 4/2/21  Long term goal 1: Pt will complete bed mobility with independence in flat bed without rails. Long term goal 2: Pt will complete sit<>stands with independence and LRAD. Long term goal 3: Pt will complete bed<>Chair transfer with LRAD and independence. Long term goal 4: Pt will ambulate 100 feet with LRAD and independence. Long term goal 5: Pt will ascend/descend 4 steps with bilateral rails and CGA. Patient Goals   Patient goals : To get stronger and go home. Plan    Plan  Times per week: 5 out of 7  Times per day: Daily  Plan weeks: 2 weeks, by 4/2/21  Current Treatment Recommendations: Strengthening, Neuromuscular Re-education, Home Exercise Program, ROM, Balance Training, Endurance Training, Functional Mobility Training, Transfer Training, Gait Training, Stair training, Wheelchair Mobility Training, Safety Education & Training, Patient/Caregiver Education & Training, Equipment Evaluation, Education, & procurement  Safety Devices  Type of devices:  All fall risk precautions in place, Nurse notified, Gait belt, Patient at risk for falls, Call light within reach, Left in bed, Bed alarm in place     Therapy Time   Individual Concurrent Group Co-treatment   Time In 1300     1230   Time Out 1330     1300   Minutes 30     30   Timed Code Treatment Minutes: 60 Minutes      Second Session Therapy Time:   Individual Concurrent Group Co-treatment   Time In 1430        Time Out 1500        Minutes 30        Timed Code Treatment Minutes:  30 Minutes    Total Treatment Minutes:  90 Minutes    Jc Monreal, PT

## 2021-04-01 LAB
GLUCOSE BLD-MCNC: 109 MG/DL (ref 70–99)
GLUCOSE BLD-MCNC: 110 MG/DL (ref 70–99)
GLUCOSE BLD-MCNC: 142 MG/DL (ref 70–99)
GLUCOSE BLD-MCNC: 167 MG/DL (ref 70–99)
PERFORMED ON: ABNORMAL

## 2021-04-01 PROCEDURE — 6370000000 HC RX 637 (ALT 250 FOR IP): Performed by: PHYSICAL MEDICINE & REHABILITATION

## 2021-04-01 PROCEDURE — 1280000000 HC REHAB R&B

## 2021-04-01 PROCEDURE — 97116 GAIT TRAINING THERAPY: CPT

## 2021-04-01 PROCEDURE — 97112 NEUROMUSCULAR REEDUCATION: CPT

## 2021-04-01 PROCEDURE — 97110 THERAPEUTIC EXERCISES: CPT

## 2021-04-01 PROCEDURE — 97535 SELF CARE MNGMENT TRAINING: CPT

## 2021-04-01 PROCEDURE — 97530 THERAPEUTIC ACTIVITIES: CPT

## 2021-04-01 PROCEDURE — 6360000002 HC RX W HCPCS: Performed by: PHYSICAL MEDICINE & REHABILITATION

## 2021-04-01 PROCEDURE — 92507 TX SP LANG VOICE COMM INDIV: CPT

## 2021-04-01 RX ADMIN — ASPIRIN 81 MG: 81 TABLET, CHEWABLE ORAL at 07:30

## 2021-04-01 RX ADMIN — ALLOPURINOL 300 MG: 300 TABLET ORAL at 07:29

## 2021-04-01 RX ADMIN — BUSPIRONE HYDROCHLORIDE 5 MG: 5 TABLET ORAL at 20:23

## 2021-04-01 RX ADMIN — ATORVASTATIN CALCIUM 80 MG: 80 TABLET, FILM COATED ORAL at 20:23

## 2021-04-01 RX ADMIN — BUSPIRONE HYDROCHLORIDE 5 MG: 5 TABLET ORAL at 07:29

## 2021-04-01 RX ADMIN — ONDANSETRON 4 MG: 4 TABLET, ORALLY DISINTEGRATING ORAL at 06:16

## 2021-04-01 RX ADMIN — INSULIN GLARGINE 20 UNITS: 100 INJECTION, SOLUTION SUBCUTANEOUS at 20:31

## 2021-04-01 RX ADMIN — AMLODIPINE BESYLATE 10 MG: 5 TABLET ORAL at 07:30

## 2021-04-01 RX ADMIN — ACETAMINOPHEN 650 MG: 325 TABLET ORAL at 23:33

## 2021-04-01 RX ADMIN — CETIRIZINE HYDROCHLORIDE 10 MG: 10 TABLET, FILM COATED ORAL at 07:29

## 2021-04-01 RX ADMIN — TRAZODONE HYDROCHLORIDE 50 MG: 50 TABLET ORAL at 20:23

## 2021-04-01 RX ADMIN — ENOXAPARIN SODIUM 40 MG: 40 INJECTION SUBCUTANEOUS at 07:29

## 2021-04-01 RX ADMIN — PROPRANOLOL HYDROCHLORIDE 80 MG: 40 TABLET ORAL at 07:29

## 2021-04-01 RX ADMIN — Medication 5 MG: at 20:23

## 2021-04-01 RX ADMIN — INSULIN LISPRO 2 UNITS: 100 INJECTION, SOLUTION INTRAVENOUS; SUBCUTANEOUS at 11:29

## 2021-04-01 RX ADMIN — BUSPIRONE HYDROCHLORIDE 5 MG: 5 TABLET ORAL at 13:13

## 2021-04-01 RX ADMIN — INSULIN LISPRO 1 UNITS: 100 INJECTION, SOLUTION INTRAVENOUS; SUBCUTANEOUS at 20:23

## 2021-04-01 NOTE — PROGRESS NOTES
Occupational Therapy  Facility/Department: [facility]  Daily Treatment Note  NAME: Cleo Aguirre  : 1950  MRN: [id number]    Date of Service: 2021    Discharge Recommendations:  24 hour supervision or assist, Outpatient OT, Continue to assess pending progress  OT Equipment Recommendations  Other: CTA pending progress    Assessment   Performance deficits / Impairments: Decreased functional mobility ; Decreased endurance;Decreased coordination;Decreased ADL status; Decreased posture;Decreased ROM; Decreased balance;Decreased strength;Decreased safe awareness;Decreased high-level IADLs;Decreased cognition;Decreased vision/visual deficit; Decreased fine motor control  Assessment: Pt continues to be very pleasant and motivated to participate in therapy, limited by L shoulder pain and feeling L side weakness/fatigue more this date. Pt required increased amount of assistance for attempted functional mobility at walker this date, and with difficulty achieving upright posture/balance for taking steps. Pt continues to improve with UE ADL, doffing/donning shirt with cues. Pt reports  plans to attend family training several times next week. Continue OT tx.  OT Education: OT Role;Plan of Care;Transfer Training;ADL Adaptive Strategies; Equipment; Energy Conservation;Precautions;Orientation;IADL Safety;Home Exercise Program  Patient Education: Importance of mobility and engaging LUE, LUE positioning, body awareness  REQUIRES OT FOLLOW UP: Yes  Activity Tolerance  Activity Tolerance: Patient Tolerated treatment well  Activity Tolerance: /70, HR 65, Spo2 96% on RA  Safety Devices  Safety Devices in place: Yes  Type of devices: Call light within reach;Gait belt;Nurse notified; Left in chair;Chair alarm in place         Patient Diagnosis(es): There were no encounter diagnoses. has a past medical history of Diabetes mellitus ([facility] [address] GERD (gastroesophageal reflux disease), and Hypertension.    has no past surgical history on file. Restrictions  Restrictions/Precautions  Restrictions/Precautions: General Precautions, Fall Risk  Position Activity Restriction  Other position/activity restrictions: up with assist  Subjective   General  Chart Reviewed: Yes  Patient assessed for rehabilitation services?: Yes  Response to previous treatment: Patient with no complaints from previous session  Family / Caregiver Present: No  Referring Practitioner: Ashok Esteban MD  Diagnosis: CVA with Left hemiparesis  Subjective  Subjective: Pt in bed on arrival, very pleasant and agreeable to treatment, reporting L side feeling very weak again this date  General Comment  Comments: RN cleared pt for OT; pt resting in bed, agreeable to therapy  Vital Signs  Patient Currently in Pain: Denies   Orientation  Orientation  Overall Orientation Status: Within Functional Limits  Objective    ADL  Feeding: Modified independent ; Increased time to complete  Grooming: Setup(seated at sink w/c level)  UE Bathing: Supervision;Setup(seated with L side unsupported, able to self-correct L lateral lean)  LE Bathing: Stand by assistance;Setup(performed seated)  UE Dressing: Stand by assistance;Verbal cueing;Setup  LE Dressing: Verbal cueing;Setup;Maximum assistance(assist to thread brief and pants over LLE (difficulty keeping in figure four), assist for socks/shoes)        Balance  Sitting Balance: Supervision  Standing Balance:  Moderate assistance(for dynamic standing/reaching tasks)  Standing Balance  Time: >2 minutes, 1-2 minutes, <1 minute over multiple trials  Activity: mobility, transfers, standing ADL, dynamic standing task  Functional Mobility  Functional - Mobility Device: Rolling Walker  Activity: Other  Assist Level: Dependent/Total(up to Mod-Max A x2, unable to ambulate 10 ft this date, significant posterior lean with difficulty correcting)  Bed mobility  Supine to Sit: Stand by assistance  Sit to Supine: Unable to assess(pt left up in chair) Transfers  Stand Step Transfers: Contact guard assistance  Sit Pivot Transfers: Contact guard assistance  Sit to stand: Minimal assistance;Contact guard assistance  Stand to sit: Contact guard assistance        Coordination  Gross Motor: Increased difficulty d/t L shoulder pain this date, able to reach with Min-Mod A x2 in stance at tabletop for rings with L hand, modified grasp and min A for release onto cones, appropriate RUE for placing rings, and good recall of 3-step instructions for task, but requiring up to Mod A x2 for balance, with PT facilitating LLE stability at knee and OT facilitating UE movement and standing balance  Fine Motor: Pt continues to be limited by LUE control, able to open items with compensatory strategies or increased time/effort for bimanual tasks - also introduced dycem for bisi techniques with ADL/IADL tasks, with pt verbalizing understanding of all education. Cognition  Overall Cognitive Status: WFL                    Type of ROM/Therapeutic Exercise  Type of ROM/Therapeutic Exercise: AAROM;AROM; Self PROM  Exercises  Shoulder Flexion: Not performed d/t pt reporting L shoulder pain this date  Elbow Flexion: x15  Elbow Extension: x15  Supination: x15  Pronation: x15  Wrist Flexion: x15  Wrist Extension: x15  Grasp/Release: x10 with resistant foam block        Plan   Plan  Times per week: 5-7 days/ wk  Times per day: Daily  Plan weeks: 2 weeks  Current Treatment Recommendations: Balance Training, Functional Mobility Training, Safety Education & Training, Positioning, Neuromuscular Re-education, Self-Care / ADL, Strengthening, Gait Training, Patient/Caregiver Education & Training, Stair training, ROM, Equipment Evaluation, Education, & procurement, Home Management Training, Endurance Training, Pain Management, Wheelchair Mobility Training, Cognitive/Perceptual Training, Cognitive Reorientation, Modalities (comment)       Goals  Short term goals  Time Frame for Short term goals: 1 week (3-26-21)  Short term goal 1: Pt will complete functional transfers mod A - GOAL MET 3/25 mod A  Short term goal 2: Pt will complete UBD using bisi-techniques mod A - GOAL MET 3/25 mod A  Short term goal 3: min assist with LUE self ROM/positioning - GOAL MET 3/29  Short term goal 4: Pt will complete toileting mod A - GOAL MET 3/29  Long term goals  Time Frame for Long term goals : 2 weeks (4-2-21)  Long term goal 1: Pt will complete functional transfers SBA  Long term goal 2: Pt will complete UBD using bisi-techniques SBA - GOAL MET, without bra, 4/1  Long term goal 3: Pt will complete toileting CGA  Patient Goals   Patient goals : \"get stronger so I can get back to my normal routines\"       Therapy Time   Individual Concurrent Group Co-treatment   Time In 0735 0830   Time Out 0830     0900   Minutes 55     30   Timed Code Treatment Minutes: 85 Minutes     Second Session Therapy Time:   Individual Concurrent Group Co-treatment   Time In 1000        Time Out 1030        Minutes 30          Timed Code Treatment Minutes:  30    Total Treatment Minutes:  [address], OTR/L

## 2021-04-01 NOTE — PLAN OF CARE
Problem: Falls - Risk of:  Goal: Will remain free from falls  Description: Will remain free from falls  4/1/2021 1001 by Luis Miguel Carpio RN  Outcome: Ongoing  Note: Pt HOB 45 this AM and pt noted to be laying supine, slightly to turned to L side. Pt assisted to sit straighter in bed for breakfast. Non-skid socks applied. Bed wheels locked and in lowest position, Call light within reach. Pt denied further needs. Pt fall education provided and pt encouraged to call for assistance when getting OOB. Nursing will continue to monitor for changes.    3/31/2021 [id number] by Felice Nava  Outcome: Ongoing

## 2021-04-01 NOTE — PROGRESS NOTES
Tato Phillips  4/1/2021  [id number]    Chief Complaint: Acute ischemic stroke ([address]    Subjective:   No issues overnight; resting in bed; no complaints this morning. ROS: no n/v, cp, sob, f/c  Objective:  Patient Vitals for the past 24 hrs:   BP Temp Temp src Pulse Resp SpO2   04/01/21 0715 134/70 97.5 °F (36.4 °C) Oral 65 16 96 %   03/31/21 2015 134/73 97.4 °F (36.3 °C) Oral 61 16 95 %     Gen: No distress, pleasant. HEENT: Normocephalic, atraumatic. CV: Regular rate and rhythm. Resp: No respiratory distress. Abd: Soft, nontender   Ext: No edema. Neuro: Alert, oriented, appropriately interactive. Wt Readings from Last 3 Encounters:   03/28/21 142 lb 6.7 oz (64.6 kg)   03/16/21 162 lb (73.5 kg)   07/14/20 158 lb 14.4 oz (72.1 kg)       Laboratory data:   Lab Results   Component Value Date    WBC 4.0 03/31/2021    HGB 13.6 03/31/2021    HCT 39.4 03/31/2021    MCV 81.9 03/31/2021     (L) 03/31/2021       Lab Results   Component Value Date     03/31/2021    K 3.8 03/31/2021    K 3.4 07/14/2020     03/31/2021    CO2 27 03/31/2021    BUN 23 03/31/2021    CREATININE 1.1 03/31/2021    GLUCOSE 118 03/31/2021    CALCIUM 11.1 03/31/2021        Therapy progress:  PT  Position Activity Restriction  Other position/activity restrictions: up with assist  Objective     Sit to Stand: Contact guard assistance, Minimal Assistance  Stand to sit: Contact guard assistance, Minimal Assistance  Bed to Chair: Minimal assistance, Moderate assistance  Device: Rolling Walker  Other Apparatus: Wheelchair follow(LUE clamshell)  Assistance: Minimal assistance, 2 Person assistance  Distance: 75 feet  OT  PT Equipment Recommendations  Equipment Needed: Yes  Mobility Devices: Marya Sylvesterier, Wheelchair  Walker: Rolling(With L clamshell)  Wheelchair: Standard(with elevating and removable leg rests and swing away armrests)  Other: Continue to assess pending pt's progress.  Potentially requiring RW with clamshell and manual w/c.  Toilet - Technique: Stand step  Equipment Used: Grab bars  Toilet Transfers Comments: max cueing for safety and sequencing of movements  Assessment        SLP  Current Diet : Regular  Current Liquid Diet : Thin  Diet Solids Recommendation: Regular  Liquid Consistency Recommendation: Thin    Body mass index is 27.81 kg/m². Assessment and Plan:  Acute ischemic infarct: ASA, statin.  PT/OT/SLP     Diabetes: control improving, Lantus 20, SSI;      HTN: propranolol 80; increased norvasc to 10     HLD: significantly elevated triglycerides, Lipitor 80     LEX: Push PO.  Patient prefers no IVF     Bowel: Per protocol  Bladder: Per protocol  Sleep: Trazodone PRN  Pain: tylenol, tramadol  DVT PPx: lovenox  CECILY: 4/10 home w/ home health  DME: Possible AFO and hyperextension knee brace, RW, manual wc with ELR and swing away arm rests, sacral cutout cushion, gait belt, ttb, grab bars     Arcelia Morgan MD  04/01/21  8:05 AM

## 2021-04-01 NOTE — PLAN OF CARE
Problem: Pain:  Goal: Pain level will decrease  Description: Pain level will decrease  Outcome: Ongoing  Goal: Control of acute pain  Description: Control of acute pain  Outcome: Ongoing  Goal: Control of chronic pain  Description: Control of chronic pain  Outcome: Ongoing     Problem: Skin Integrity:  Goal: Will show no infection signs and symptoms  Description: Will show no infection signs and symptoms  Outcome: Ongoing  Goal: Absence of new skin breakdown  Description: Absence of new skin breakdown  Outcome: Ongoing     Problem: Falls - Risk of:  Goal: Will remain free from falls  Description: Will remain free from falls  Outcome: Ongoing  Goal: Absence of physical injury  Description: Absence of physical injury  Outcome: Ongoing     Problem: Nutrition  Goal: Optimal nutrition therapy  3/31/2021 2338 by Wendy DROSEY  Outcome: Ongoing  3/31/2021 1332 by Eda Paget, MS, RD, LD  Outcome: Ongoing  Note: Nutrition Problem #1: Inadequate oral intake  Intervention: Food and/or Nutrient Delivery: Continue Current Diet, Discontinue Oral Nutrition Supplement  Nutritional Goals: Patient will eat 50% or greater of meals.

## 2021-04-01 NOTE — PROGRESS NOTES
[facility]: ACUTE REHAB UNIT  SPEECH-LANGUAGE PATHOLOGY      [x] Daily  [] Weekly Care Conference Note  [x] Discharge    [address]      IVV:3/53/3236  [facility]:[id number]  Rehab Dx/Hx: Acute ischemic stroke ([facility] [address] [I63.9]    Precautions: falls  Home situation: lives independently, manages all household tasks, active    ST Dx: [] Aphasia  [x] Dysarthria  [] Apraxia   [] Oropharyngeal dysphagia [] Cognitive Impairment  [] Other:   Date of Admit: 3/19/2021  Room #: 3172/4680-80    Current functional status (updated daily):         Pt being seen for : [x] Speech/Language Treatment  [] Dysphagia Treatment [] Cognitive Treatment  [] Other:  Communication: []WFL  [] Aphasia  [x] Dysarthria  [] Apraxia  [] Pragmatic Impairment [] Non-verbal  [] Hearing Loss  [] Other:   Cognition: [x] WFL  [] Mild  [] Moderate  [] Severe [] Unable to Assess  [] Other:  Memory: [x] WFL  [] Mild  [] Moderate  [] Severe [] Unable to Assess  [] Other:  Behavior: [x] Alert  [x] Cooperative  [x]  Pleasant  [] Confused  [] Agitated  [] Uncooperative  [] Distractible [] Motivated  [] Self-Limiting [] Anxious  [] Other:  Endurance:  [x] Adequate for participation in SLP sessions  [] Reduced overall  [] Lethargic  [] Other:  Safety: [x] No concerns at this time  [] Reduced insight into deficits  []  Reduced safety awareness [] Not following call light procedures  [] Unable to Assess  [] Other:    Current Diet Order:DIET GENERAL; Carb Control: 4 carb choices (60 gms)/meal  Swallowing Precautions: Sit up for all meals and thereafter for 30 minutes, Eat with small bites (1/2 tsp; 1 tsp), Alternate solids with liquids and Take small sips of water at the end of snacks and meals        Date: 4/1/2021       Tx session 1  [id number] Tx session 2  All tx needs met session 1 DISCHARGE SUMMARY   Total Timed Code Min 0 0    Total Treatment Minutes 30 0    Individual Treatment Minutes 30 0    Group Treatment Minutes 0 0    Co-Treat Minutes 0 0 structured speech tasks (tongue twisters) and during conversation (SLP and pt discussed various conversational topics)     GOAL MET  Consistent progress made across sessions. Pt's overall SPL was an average of 72 dB during structured tasks and in conversation this date. Other areas targeted: SLP and pt discussed avoiding distractions as able for improved communication and intelligibility. Pt was also encouraged to read aloud at times to \"practice\" use of speech strategies. Education:   edu provided re: continued importance of use of speech strategies, outpatient options. Pt has verbalized understanding of all reviewed strategies / education provided across sessions. Safety Devices: [x] Call light within reach  [x] Chair alarm activated  [] Bed alarm activated  [] Other: [] Call light within reach  [] Chair alarm activated  [] Bed alarm activated  [] Other:     Assessment: Pt has continued to make progress across therapy sessions and is motivated to improve and participate. She endorses improvement in her speech as well. She occasionally requires min cues for review of breath support and compensatory speech strategies for improved intelligibility. Pt's speech is judged to be 100% intelligible in conversation and pt's overall SPL was an average of 72 dB during structured tasks and conversation in today's tx session. Pt has met all short-term goals at this time and will be discharged from [address] [place] [address] . She denies concerns / questions regarding ST and verbalized understanding of all education / strategies. Plan: Pt to be discharged from [address] [place] [address] at this time. Additional Information: N/A    Barriers toward progress: None   Discharge recommendations:  [] Home independently  [x] Home with assistance []  24 hour supervision  [] ECF [x] Other:  See PT/OT recs for more detail.   Continued Tx Upon Discharge: ? [x] Yes [] No [] TBD based on progress while on ARU [] Vital Stim indicated [] Other:   Estimated discharge date: 4/10/2021  Interventions used this date:  [x] Speech/Language Treatment  [] Instruction in HEP [] Group [] Dysphagia Treatment [] Cognitive Treatment   [] Other:       Total Time Breakdown / Charges    Time in Time out Total Time / units   Cognitive Tx -- -- --   Speech Tx 1100 1130 30 min / 1 unit   Dysphagia Tx -- -- --       Electronically Signed by  Sergio Rubio M.S. [facility]  Speech-language pathologist  [id number]    Edda Schroeder, Speech Therapy

## 2021-04-01 NOTE — PROGRESS NOTES
Physical Therapy  Facility/Department: [facility]  Daily Treatment Note  NAME: Lola Flores  : 1950  MRN: [id number]    Date of Service: 2021    Discharge Recommendations:  24 hour supervision or assist, Home with Home health PT   PT Equipment Recommendations  Equipment Needed: Yes  Mobility Devices: [place]; Wheelchair  Walker: Rolling(With L clamshell)  Wheelchair: Standard(with elevating and removable leg rests and swing away armrests)    Assessment   Body structures, Functions, Activity limitations: Decreased functional mobility ; Decreased endurance;Decreased ROM; Decreased balance;Decreased posture;Decreased coordination;Decreased strength;Decreased fine motor control; Increased pain  Assessment: Pt is progressing well with treatment. Pt seen for co-tx with OT to progress ambulation with RW, and dynamic standing balance. Pt demos more fatigue this date and requires additional physical assist. Individual session focused on w/c mobility with BLE to increase continuous pattern, SPT, standing balance, SciFit for endurance, and problem solving through safe ways for transfers with appropriate positioning. Pt continues to demo improved endurance, safety, and decreased assist after a period of rest. With additional fatigue this date, pt requires Mod A x2 for ambulation with RW. Pt will benefit from continued skilled PT to address deficits above. Continue ARU PT POC. Treatment Diagnosis: impaired mobility  Prognosis: Good  PT Education: Goals;Gait Training;PT Role;Disease Specific Education;Plan of Care; Functional Mobility Training;Transfer Training;Home Exercise Program;Precautions;General Safety;Weight-bearing Education  Patient Education: Educated on continuous pattern with walking feet in w/c. Pt verbalized understanding, but will benefit from additional education.   Barriers to Learning: no  REQUIRES PT FOLLOW UP: Yes  Activity Tolerance  Activity Tolerance: Patient Tolerated treatment well  Activity Tolerance: No SOB or pain observed. Patient Diagnosis(es): There were no encounter diagnoses. has a past medical history of Diabetes mellitus ([address] GERD (gastroesophageal reflux disease), and Hypertension. has no past surgical history on file. Restrictions  Restrictions/Precautions  Restrictions/Precautions: General Precautions, Fall Risk  Position Activity Restriction  Other position/activity restrictions: up with assist  Subjective   General  Chart Reviewed: Yes  Response To Previous Treatment: Patient with no complaints from previous session. Family / Caregiver Present: No  Referring Practitioner: Betsy Lovelace MD  Subjective  Subjective: Pt up with OT during first session, second session pt is supine in bed. General Comment  Comments: RN cleared pt for therapy. Pt feeling strong today. Pain Screening  Patient Currently in Pain: Denies  Vital Signs  Patient Currently in Pain: Denies       Orientation  Orientation  Overall Orientation Status: Within Normal Limits  Cognition   Cognition  Overall Cognitive Status: WFL  Objective   Bed mobility  Supine to Sit: Stand by assistance  Sit to Supine: Stand by assistance  Scooting: Stand by assistance  Comment: Slow to complete, HOB flat, bed rails used. Transfers  Sit to Stand: Contact guard assistance;Minimal Assistance  Stand to sit: Contact guard assistance;Minimal Assistance  Bed to Chair: Minimal assistance; Moderate assistance  Stand Pivot Transfers: Minimal Assistance;Contact guard assistance  Comment: SPT bed<>w/c no AD CGA/Piotr, sit to/from stand w/c to RW Min Ax1-2 completed x 10 reps, intermittent cues for hand placement; cues for positioning for SPT each time. Ambulation  Ambulation?: Yes  More Ambulation?: No  Ambulation 1  Surface: level tile  Device: Rolling Walker  Other Apparatus: Wheelchair follow  Assistance: Minimal assistance;2 Person assistance; Moderate assistance  Quality of Gait: Step to gait pattern, leading with LLE, ataxic gait with decreased coordination and motor planning LLE. Decreased stance time on the left. BLE genu recurvatuum increased today. Pt mildly unsteady with multiple LOB, pt demos decreased stability this date. Gait Deviations: Slow Lisa;Decreased step length;Decreased step height;Shuffles;Staggers;Decreased head and trunk rotation  Distance: 50 feet  Comments: increased fatigue noted this date. Wheelchair Activities  Left Brakes Level of Assistance: Stand by assistance  Right Brakes Level of Assistance: Stand by Assist  Propulsion 1  Propulsion: Manual  Level: Level Tile  Method: RLE;RUE  Level of Assistance: Stand by assistance  Description/ Details: Completed w/c mobility with occasional cues for propulsion through doorways and closer objects. Frequent redirecting left LE to progress. Pt able to complete with better control this date with less dragging left LE behind her. Distance: 150 feet + 150 feet + 100 feet     Balance  Posture: Fair  Sitting - Static: Good;-  Sitting - Dynamic: Fair;+  Standing - Static: Fair  Standing - Dynamic: Fair;-  Comments: Dynamic standing with OT faciliating trunk control and BUE movement with PT focusing on left knee position and weight shifting; Pt tolerated well with seated breaks inbetween ~10-15 minutes. Pt completed placing rings on cones with weightshifting facilated by her right and left UE. Pt attempted to complete with Mod<>max Ax1 with poor balance and increased fatigue. Other exercises  Other exercises 1: SCIFIT 5 minutes level 1 with BUE and BLE to improve cardiorespiratory endurance and imprvoe gait through reciprocal UE and LE movements, pt maintains SPM > 50. LUE supported by elbow to complete hand over hand placement with cues to limit hip ABD        Goals  Short term goals  Time Frame for Short term goals: 1 week, 3/26/21  Short term goal 1: Pt will complete bed mobility with SBA and no rails.  -- GOAL PARTIALLY MET 3/27: Supine to sit SBA with HOB elevated and BR.  Short term goal 2: Pt will complete sit<>stand with Min A with LRAD. -- GOAL MET Piotr to CGA with RW  Short term goal 3: Pt will ambulate 100 feet with LRAD and Mod Ax1. -- GOAL NOT MET but progressing Piotr x 2 up to 48' with RW  Short term goal 4: Pt will complete bed<>chair transfer with LRAD and Min A. -- GOAL MET Piotr to CGA with RW  Short term goal 5: Pt will complete 150 feet of w/c mobility with multiple turns and IND. 3/26: 150 feet with SBA  Long term goals  Time Frame for Long term goals : 2 weeks, by 4/2/21  Long term goal 1: Pt will complete bed mobility with independence in flat bed without rails. Long term goal 2: Pt will complete sit<>stands with independence and LRAD. Long term goal 3: Pt will complete bed<>Chair transfer with LRAD and independence. Long term goal 4: Pt will ambulate 100 feet with LRAD and independence. Long term goal 5: Pt will ascend/descend 4 steps with bilateral rails and CGA. Patient Goals   Patient goals : To get stronger and go home. Plan    Plan  Times per week: 5 out of 7  Times per day: Daily  Plan weeks: 2 weeks, by 4/2/21  Current Treatment Recommendations: Strengthening, Neuromuscular Re-education, Home Exercise Program, ROM, Balance Training, Endurance Training, Functional Mobility Training, Transfer Training, Gait Training, Stair training, Wheelchair Mobility Training, Safety Education & Training, Patient/Caregiver Education & Training, Equipment Evaluation, Education, & procurement  Safety Devices  Type of devices:  All fall risk precautions in place, Nurse notified, Gait belt, Patient at risk for falls, Call light within reach, Left in bed, Bed alarm in place     Therapy Time   Individual Concurrent Group Co-treatment   Time In 0900     0830   Time Out 0930     0900   Minutes 30     30   Timed Code Treatment Minutes: 60 Minutes     Second Session Therapy Time:   Individual Concurrent Group Co-treatment   Time In 1230        Time Out 1300        Minutes 30 Timed Code Treatment Minutes:  30 Minutes    Total Treatment Minutes:  80 Minutes    Jessi Woods, [address] [facility]

## 2021-04-02 LAB
ANION GAP SERPL CALCULATED.3IONS-SCNC: 8 MMOL/L (ref 3–16)
BUN BLDV-MCNC: 29 MG/DL (ref 7–20)
CALCIUM SERPL-MCNC: 10.9 MG/DL (ref 8.3–10.6)
CHLORIDE BLD-SCNC: 106 MMOL/L (ref 99–110)
CO2: 27 MMOL/L (ref 21–32)
CREAT SERPL-MCNC: 1.2 MG/DL (ref 0.6–1.2)
GFR AFRICAN AMERICAN: 54
GFR NON-AFRICAN AMERICAN: 44
GLUCOSE BLD-MCNC: 118 MG/DL (ref 70–99)
GLUCOSE BLD-MCNC: 123 MG/DL (ref 70–99)
GLUCOSE BLD-MCNC: 126 MG/DL (ref 70–99)
GLUCOSE BLD-MCNC: 127 MG/DL (ref 70–99)
GLUCOSE BLD-MCNC: 150 MG/DL (ref 70–99)
HCT VFR BLD CALC: 37.9 % (ref 36–48)
HEMOGLOBIN: 12.8 G/DL (ref 12–16)
MCH RBC QN AUTO: 28 PG (ref 26–34)
MCHC RBC AUTO-ENTMCNC: 33.7 G/DL (ref 31–36)
MCV RBC AUTO: 83 FL (ref 80–100)
PDW BLD-RTO: 14.8 % (ref 12.4–15.4)
PERFORMED ON: ABNORMAL
PLATELET # BLD: 124 K/UL (ref 135–450)
PMV BLD AUTO: 7.8 FL (ref 5–10.5)
POTASSIUM SERPL-SCNC: 3.9 MMOL/L (ref 3.5–5.1)
RBC # BLD: 4.56 M/UL (ref 4–5.2)
SODIUM BLD-SCNC: 141 MMOL/L (ref 136–145)
WBC # BLD: 4 K/UL (ref 4–11)

## 2021-04-02 PROCEDURE — 6370000000 HC RX 637 (ALT 250 FOR IP): Performed by: PHYSICAL MEDICINE & REHABILITATION

## 2021-04-02 PROCEDURE — 97116 GAIT TRAINING THERAPY: CPT

## 2021-04-02 PROCEDURE — 85027 COMPLETE CBC AUTOMATED: CPT

## 2021-04-02 PROCEDURE — 97535 SELF CARE MNGMENT TRAINING: CPT

## 2021-04-02 PROCEDURE — 97530 THERAPEUTIC ACTIVITIES: CPT

## 2021-04-02 PROCEDURE — 80048 BASIC METABOLIC PNL TOTAL CA: CPT

## 2021-04-02 PROCEDURE — 97110 THERAPEUTIC EXERCISES: CPT

## 2021-04-02 PROCEDURE — 1280000000 HC REHAB R&B

## 2021-04-02 PROCEDURE — 36415 COLL VENOUS BLD VENIPUNCTURE: CPT

## 2021-04-02 PROCEDURE — 6360000002 HC RX W HCPCS: Performed by: PHYSICAL MEDICINE & REHABILITATION

## 2021-04-02 PROCEDURE — 97112 NEUROMUSCULAR REEDUCATION: CPT

## 2021-04-02 RX ADMIN — CETIRIZINE HYDROCHLORIDE 10 MG: 10 TABLET, FILM COATED ORAL at 08:25

## 2021-04-02 RX ADMIN — PROPRANOLOL HYDROCHLORIDE 80 MG: 40 TABLET ORAL at 08:25

## 2021-04-02 RX ADMIN — ACETAMINOPHEN 650 MG: 325 TABLET ORAL at 20:56

## 2021-04-02 RX ADMIN — BUSPIRONE HYDROCHLORIDE 5 MG: 5 TABLET ORAL at 13:21

## 2021-04-02 RX ADMIN — ALLOPURINOL 300 MG: 300 TABLET ORAL at 08:25

## 2021-04-02 RX ADMIN — DIPHENHYDRAMINE HCL 25 MG: 25 TABLET ORAL at 16:20

## 2021-04-02 RX ADMIN — INSULIN GLARGINE 20 UNITS: 100 INJECTION, SOLUTION SUBCUTANEOUS at 20:58

## 2021-04-02 RX ADMIN — AMLODIPINE BESYLATE 10 MG: 5 TABLET ORAL at 08:25

## 2021-04-02 RX ADMIN — BUSPIRONE HYDROCHLORIDE 5 MG: 5 TABLET ORAL at 08:25

## 2021-04-02 RX ADMIN — ASPIRIN 81 MG: 81 TABLET, CHEWABLE ORAL at 08:25

## 2021-04-02 RX ADMIN — POLYETHYLENE GLYCOL 3350 17 G: 17 POWDER, FOR SOLUTION ORAL at 13:21

## 2021-04-02 RX ADMIN — BUSPIRONE HYDROCHLORIDE 5 MG: 5 TABLET ORAL at 20:56

## 2021-04-02 RX ADMIN — Medication 5 MG: at 20:57

## 2021-04-02 RX ADMIN — INSULIN LISPRO 1 UNITS: 100 INJECTION, SOLUTION INTRAVENOUS; SUBCUTANEOUS at 20:59

## 2021-04-02 RX ADMIN — ENOXAPARIN SODIUM 40 MG: 40 INJECTION SUBCUTANEOUS at 08:25

## 2021-04-02 RX ADMIN — ATORVASTATIN CALCIUM 80 MG: 80 TABLET, FILM COATED ORAL at 20:57

## 2021-04-02 ASSESSMENT — PAIN SCALES - GENERAL
PAINLEVEL_OUTOF10: 7
PAINLEVEL_OUTOF10: 0

## 2021-04-02 ASSESSMENT — PAIN DESCRIPTION - LOCATION: LOCATION: SACRUM

## 2021-04-02 ASSESSMENT — PAIN DESCRIPTION - PAIN TYPE: TYPE: CHRONIC PAIN

## 2021-04-02 ASSESSMENT — PAIN DESCRIPTION - ORIENTATION: ORIENTATION: LOWER

## 2021-04-02 NOTE — PROGRESS NOTES
Keya Morris  4/2/2021  [id number]    Chief Complaint: Acute ischemic stroke [facility])    Subjective:   No new complaints this morning. No issues overnight. Resting in bed. ROS: no n/v, cp, sob, f/c  Objective:  Patient Vitals for the past 24 hrs:   BP Temp Temp src Pulse Resp SpO2   04/02/21 0815 124/65 97.8 °F (36.6 °C) Oral 66 16 95 %   04/01/21 2015 110/62 97.7 °F (36.5 °C) Oral 59 16 97 %     Gen: No distress, pleasant. HEENT: Normocephalic, atraumatic. CV: Regular rate and rhythm. Resp: No respiratory distress. Abd: Soft, nontender   Ext: No edema. Neuro: Alert, oriented, appropriately interactive. Wt Readings from Last 3 Encounters:   03/28/21 142 lb 6.7 oz (64.6 kg)   03/16/21 162 lb (73.5 kg)   07/14/20 158 lb 14.4 oz (72.1 kg)       Laboratory data:   Lab Results   Component Value Date    WBC 4.0 04/02/2021    HGB 12.8 04/02/2021    HCT 37.9 04/02/2021    MCV 83.0 04/02/2021     (L) 04/02/2021       Lab Results   Component Value Date     04/02/2021    K 3.9 04/02/2021    K 3.4 07/14/2020     04/02/2021    CO2 27 04/02/2021    BUN 29 04/02/2021    CREATININE 1.2 04/02/2021    GLUCOSE 123 04/02/2021    CALCIUM 10.9 04/02/2021        Therapy progress:  PT  Position Activity Restriction  Other position/activity restrictions: up with assist  Objective     Sit to Stand: Contact guard assistance, Minimal Assistance  Stand to sit: Contact guard assistance, Minimal Assistance  Bed to Chair: Minimal assistance, Moderate assistance  Device: Rolling Walker  Other Apparatus: Wheelchair follow  Assistance: Minimal assistance, 2 Person assistance, Moderate assistance  Distance: 50 feet  OT  PT Equipment Recommendations  Equipment Needed: Yes  Mobility Devices: Arlen Breeze, Wheelchair  Walker: Rolling(With L clamshell)  Wheelchair: Standard(with elevating and removable leg rests and swing away armrests)  Other: Continue to assess pending pt's progress.  Potentially requiring RW with rosita and manual w/c. Toilet - Technique: Stand step  Equipment Used: Grab bars  Toilet Transfers Comments: max cueing for safety and sequencing of movements  Assessment        SLP  Current Diet : Regular  Current Liquid Diet : Thin  Diet Solids Recommendation: Regular  Liquid Consistency Recommendation: Thin    Body mass index is 27.81 kg/m². Assessment and Plan:  Acute ischemic infarct: ASA, statin.  PT/OT/SLP     Diabetes: control improving, Lantus 20, SSI;      HTN: propranolol 80; increased norvasc to 10     HLD: significantly elevated triglycerides, Lipitor 80     LEX: Push PO.  Patient prefers no IVF     Bowel: Per protocol  Bladder: Per protocol  Sleep: Trazodone PRN  Pain: tylenol, tramadol  DVT PPx: lovenox  CECILY: 4/10 home w/ home health  DME: Possible AFO and hyperextension knee brace, RW, manual wc with ELR and swing away arm rests, sacral cutout cushion, gait belt, ttb, grab bars     Meka Ayala MD  04/02/21  9:10 AM

## 2021-04-02 NOTE — PROGRESS NOTES
Occupational Therapy  Facility/Department: [facility]  Daily Treatment Note  NAME: Yasemin Treviño  : 1950  MRN: [id number]    Date of Service: 2021    Discharge Recommendations:  24 hour supervision or assist, Home with Home health OT  OT Equipment Recommendations  Other: CTA pending progress    Assessment   Performance deficits / Impairments: Decreased functional mobility ; Decreased endurance;Decreased coordination;Decreased ADL status; Decreased posture;Decreased ROM; Decreased balance;Decreased strength;Decreased safe awareness;Decreased high-level IADLs;Decreased cognition;Decreased vision/visual deficit; Decreased fine motor control  Assessment: Pt continues to be very pleasant and motivated to participate in therapy, reporting L side feeling better this date, and with improved standing tolerance/balance and able to participate in ambulation with improved level of assist with cues. Pt continues to require up to Mod A for LE ADL, and limited by LUE control, but motivated to engage in ADL tasks and bimanual tasks. Continue OT tx.  OT Education: OT Role;Plan of Care;Transfer Training;ADL Adaptive Strategies; Equipment; Energy Conservation;Precautions;Orientation;IADL Safety;Home Exercise Program  Patient Education: Importance of mobility and engaging LUE, LUE positioning, body awareness  REQUIRES OT FOLLOW UP: Yes  Activity Tolerance  Activity Tolerance: Patient Tolerated treatment well  Activity Tolerance: /65, HR 66, Spo2 95% on RA as taken by nursing  Safety Devices  Safety Devices in place: Yes  Type of devices: Call light within reach;Gait belt;Nurse notified; Left in bed;Bed alarm in place         Patient Diagnosis(es): There were no encounter diagnoses. has a past medical history of Diabetes mellitus ([address] GERD (gastroesophageal reflux disease), and Hypertension. has no past surgical history on file.     Restrictions  Restrictions/Precautions  Restrictions/Precautions: General Ji Rebolledo, OTR/L

## 2021-04-02 NOTE — PLAN OF CARE
Problem: Pain:  Goal: Pain level will decrease  Description: Pain level will decrease  4/2/2021 1441 by Modesto Del Rosario RN  Outcome: Ongoing  4/2/2021 0116 by Sumeet DORSEY  Outcome: Ongoing  Goal: Control of acute pain  Description: Control of acute pain  4/2/2021 1441 by Modesto Del Rosario RN  Outcome: Ongoing  4/2/2021 0116 by Sumeet DORSEY  Outcome: Ongoing  Goal: Control of chronic pain  Description: Control of chronic pain  4/2/2021 1441 by Modesto Del Rosario RN  Outcome: Ongoing  4/2/2021 0116 by Sumeet DORSEY  Outcome: Ongoing     Problem: Skin Integrity:  Goal: Will show no infection signs and symptoms  Description: Will show no infection signs and symptoms  4/2/2021 1441 by Modesto Del Rosario RN  Outcome: Ongoing  4/2/2021 0116 by Sumeet DORSEY  Outcome: Ongoing  Goal: Absence of new skin breakdown  Description: Absence of new skin breakdown  4/2/2021 1441 by Modesto Del Rosario RN  Outcome: Ongoing  4/2/2021 0116 by Sumeet DORSEY  Outcome: Ongoing     Problem: Falls - Risk of:  Goal: Will remain free from falls  Description: Will remain free from falls  4/2/2021 1441 by Modesto Del Rosario RN  Outcome: Ongoing  4/2/2021 0116 by Sumeet DORSEY  Outcome: Ongoing  Goal: Absence of physical injury  Description: Absence of physical injury  4/2/2021 1441 by Modesto Del Rosario RN  Outcome: Ongoing  4/2/2021 0116 by Sumeet DORSEY  Outcome: Ongoing     Problem: Nutrition  Goal: Optimal nutrition therapy  4/2/2021 1441 by Modesto Del Rosario RN  Outcome: Ongoing  4/2/2021 0116 by Ashish Don  Outcome: Ongoing

## 2021-04-02 NOTE — PROGRESS NOTES
Physical Therapy  Facility/Department: [facility]  Daily Treatment Note  NAME: Biju Canas  : 1950  MRN: [id number]    Date of Service: 2021    Discharge Recommendations:  24 hour supervision or assist, Home with Home health PT   PT Equipment Recommendations  Equipment Needed: Yes  Mobility Devices: Gaetana Scales; Wheelchair  Walker: Rolling(With L clamshell)  Wheelchair: Standard(with elevating and removable leg rests and swing away armrests)    Assessment   Body structures, Functions, Activity limitations: Decreased functional mobility ; Decreased endurance;Decreased ROM; Decreased balance;Decreased posture;Decreased coordination;Decreased strength;Decreased fine motor control; Increased pain  Assessment: Pt progressing well with therapy. Pt continues to be seen as a co-tx for dynamic standing balance and ambulation with RW. Pt demos increased fatigue with progressing left LE with ambulation and w/c propulsion. Pt complete static standing task with seated rest breaks for ~35-40 minutes with up to Mod Ax1-2. Pt able to propel w/c 150 feet with SBA for cues to progress left LE with improved control. Pt completes SPT with CGA<>Min A consistently with cues for positioning. Pt practiced rolling and supine<>sit in a flat bed without rails with CGA to the right and left. Pt will benefit from continued skilled PT to address deficits and increase independence. Pt continues to make improvements each session. Continue ARU PT POC. Treatment Diagnosis: impaired mobility  Prognosis: Good  PT Education: Goals;Gait Training;PT Role;Disease Specific Education;Plan of Care; Functional Mobility Training;Transfer Training;Home Exercise Program;Precautions;General Safety;Weight-bearing Education  Patient Education: Educated on ther ex program for the weekend. Pt verbalized understanding.   Barriers to Learning: no  REQUIRES PT FOLLOW UP: Yes  Activity Tolerance  Activity Tolerance: Patient Tolerated treatment well  Activity Tolerance: No SOB or pain observed. Patient Diagnosis(es): There were no encounter diagnoses. has a past medical history of Diabetes mellitus ([address] GERD (gastroesophageal reflux disease), and Hypertension. has no past surgical history on file. Restrictions  Restrictions/Precautions  Restrictions/Precautions: General Precautions, Fall Risk  Position Activity Restriction  Other position/activity restrictions: up with assist  Subjective   General  Chart Reviewed: Yes  Response To Previous Treatment: Patient with no complaints from previous session. Family / Caregiver Present: No  Referring Practitioner: Vianney Huitron MD  Subjective  Subjective: Pt up with OT during first session, second session pt is supine in bed. General Comment  Comments: RN cleared pt for therapy. Orientation  Orientation  Overall Orientation Status: Within Normal Limits  Cognition      Objective   Bed mobility  Rolling to Left: Contact guard assistance  Rolling to Right: Contact guard assistance  Supine to Sit: Contact guard assistance  Sit to Supine: Contact guard assistance  Comment: CGA without rails and HOB flat, extended time to complete and CGA for brief assist with BLE and trunk control at times. Completed 2x to the right and left  Transfers  Sit to Stand: Contact guard assistance;Minimal Assistance  Stand to sit: Contact guard assistance;Minimal Assistance  Bed to Chair: Minimal assistance; Moderate assistance  Stand Pivot Transfers: Minimal Assistance;Contact guard assistance  Comment: SPT bed<>w/c no AD CGA, sit to/from stand w/c to RW Min A<>Mod Ax1-2 d/t increased fatigue, intermittent cues for hand placement; cues for positioning for SPT each time for safety. Pt  Ambulation  Ambulation?: Yes  More Ambulation?: No  Ambulation 1  Surface: level tile  Device: Rolling Walker  Other Apparatus: Wheelchair follow  Assistance: Minimal assistance;2 Person assistance; Moderate assistance  Quality of Gait: Step to gait pattern, leading with LLE, ataxic gait with decreased coordination and motor planning LLE. Decreased stance time on the left. BLE genu recurvatuum. Pt demos dragging left LE and ER on the left this date. Gait Deviations: Slow Lisa;Decreased step length;Decreased step height;Shuffles;Staggers;Decreased head and trunk rotation  Distance: 50 feet + 35 feet  Stairs/Curb  Stairs?: No(deferred d/t safety.)  Wheelchair Activities  Propulsion: Yes  Propulsion 1  Propulsion: Manual  Level: Level Tile  Method: RLE;RUE  Level of Assistance: Stand by assistance  Description/ Details: Completed w/c mobility with occasional cues for propulsion through doorways and closer objects. Frequent redirecting left LE to progress. Left LE fatigued and requires additional cues this session. Distance: 150 feet + 50 feet     Balance  Posture: Fair  Sitting - Static: Good;-  Sitting - Dynamic: Fair;+  Standing - Static: Fair  Standing - Dynamic: Fair;-  Comments: Dynamic standing balance with OT for dressing task standing with Min<>Mod Ax2 with OT faciliating dressing while OT faciliated weightshifting and balance. Pt completed a puzzle standing for ~40 minutes with therapeutic seated rest breaks d/t fatigue. Left UE faciliated with OT to place pieces, and PT faciliated left LE position to decrease hyperextension or flexion, and weight shifting. ABle to complete with Mod A x2 for subsequent bouts with assist for trunk control and weight shifting. Exercises  Core Strengthening: x10 partial sit ups, x10 partial sit ups to the right, x10 parital sit ups to the left, with hands clasped, 51 degrees in supine  Comments: Verbally reviewed additional exercises to complete this weekend. Pt verbalized understanding and states she will complete. Goals  Short term goals  Time Frame for Short term goals: 1 week, 3/26/21  Short term goal 1: Pt will complete bed mobility with SBA and no rails.  -- GOAL PARTIALLY MET 4/2: Supine to sit CGA with HOB flat and no rails. Short term goal 2: Pt will complete sit<>stand with Min A with LRAD. -- GOAL MET Piotr to CGA with RW  Short term goal 3: Pt will ambulate 100 feet with LRAD and Mod Ax1. -- GOAL NOT MET but progressing Piotr x 2 up to 48' with RW  Short term goal 4: Pt will complete bed<>chair transfer with LRAD and Min A. -- GOAL MET Piotr to CGA with RW  Short term goal 5: Pt will complete 150 feet of w/c mobility with multiple turns and IND. 4/2: 150 feet with SBA  Long term goals  Time Frame for Long term goals : 2 weeks, by 4/2/21  Long term goal 1: Pt will complete bed mobility with independence in flat bed without rails. Long term goal 2: Pt will complete sit<>stands with independence and LRAD. Long term goal 3: Pt will complete bed<>Chair transfer with LRAD and independence. Long term goal 4: Pt will ambulate 100 feet with LRAD and independence. Long term goal 5: Pt will ascend/descend 4 steps with bilateral rails and CGA. Patient Goals   Patient goals : To get stronger and go home. Plan    Plan  Times per week: 5 out of 7  Times per day: Daily  Plan weeks: 2 weeks, by 4/2/21  Current Treatment Recommendations: Strengthening, Neuromuscular Re-education, Home Exercise Program, ROM, Balance Training, Endurance Training, Functional Mobility Training, Transfer Training, Gait Training, Stair training, Wheelchair Mobility Training, Safety Education & Training, Patient/Caregiver Education & Training, Equipment Evaluation, Education, & procurement  Safety Devices  Type of devices:  All fall risk precautions in place, Nurse notified, Gait belt, Patient at risk for falls, Call light within reach, Left in bed, Bed alarm in place     Therapy Time   Individual Concurrent Group Co-treatment   Time In 1030     1000   Time Out 1100     1030   Minutes 30     30   Timed Code Treatment Minutes: 60 Minutes     Second Session Therapy Time:   Individual Concurrent Group Co-treatment   Time In 1430        Time Out 1500 Minutes 30        Timed Code Treatment Minutes:  30 Minutes    Total Treatment Minutes:  [address] Minutes    Jc Monreal [place]

## 2021-04-03 LAB
GLUCOSE BLD-MCNC: 100 MG/DL (ref 70–99)
GLUCOSE BLD-MCNC: 121 MG/DL (ref 70–99)
GLUCOSE BLD-MCNC: 202 MG/DL (ref 70–99)
GLUCOSE BLD-MCNC: 99 MG/DL (ref 70–99)
PERFORMED ON: ABNORMAL
PERFORMED ON: NORMAL

## 2021-04-03 PROCEDURE — 1280000000 HC REHAB R&B

## 2021-04-03 PROCEDURE — 6360000002 HC RX W HCPCS: Performed by: PHYSICAL MEDICINE & REHABILITATION

## 2021-04-03 PROCEDURE — 6370000000 HC RX 637 (ALT 250 FOR IP): Performed by: PHYSICAL MEDICINE & REHABILITATION

## 2021-04-03 PROCEDURE — 97110 THERAPEUTIC EXERCISES: CPT

## 2021-04-03 PROCEDURE — 97530 THERAPEUTIC ACTIVITIES: CPT

## 2021-04-03 RX ADMIN — ACETAMINOPHEN 650 MG: 325 TABLET ORAL at 20:54

## 2021-04-03 RX ADMIN — ENOXAPARIN SODIUM 40 MG: 40 INJECTION SUBCUTANEOUS at 07:51

## 2021-04-03 RX ADMIN — BUSPIRONE HYDROCHLORIDE 5 MG: 5 TABLET ORAL at 20:55

## 2021-04-03 RX ADMIN — ALLOPURINOL 300 MG: 300 TABLET ORAL at 07:52

## 2021-04-03 RX ADMIN — ATORVASTATIN CALCIUM 80 MG: 80 TABLET, FILM COATED ORAL at 20:54

## 2021-04-03 RX ADMIN — PROPRANOLOL HYDROCHLORIDE 80 MG: 40 TABLET ORAL at 07:51

## 2021-04-03 RX ADMIN — AMLODIPINE BESYLATE 10 MG: 5 TABLET ORAL at 07:51

## 2021-04-03 RX ADMIN — BUSPIRONE HYDROCHLORIDE 5 MG: 5 TABLET ORAL at 16:08

## 2021-04-03 RX ADMIN — BUSPIRONE HYDROCHLORIDE 5 MG: 5 TABLET ORAL at 07:52

## 2021-04-03 RX ADMIN — CETIRIZINE HYDROCHLORIDE 10 MG: 10 TABLET, FILM COATED ORAL at 07:52

## 2021-04-03 RX ADMIN — INSULIN GLARGINE 20 UNITS: 100 INJECTION, SOLUTION SUBCUTANEOUS at 20:57

## 2021-04-03 RX ADMIN — ASPIRIN 81 MG: 81 TABLET, CHEWABLE ORAL at 07:52

## 2021-04-03 RX ADMIN — Medication 5 MG: at 20:54

## 2021-04-03 RX ADMIN — INSULIN LISPRO 4 UNITS: 100 INJECTION, SOLUTION INTRAVENOUS; SUBCUTANEOUS at 11:22

## 2021-04-03 ASSESSMENT — PAIN SCALES - GENERAL: PAINLEVEL_OUTOF10: 0

## 2021-04-03 ASSESSMENT — PAIN DESCRIPTION - LOCATION: LOCATION: HIP

## 2021-04-03 ASSESSMENT — PAIN DESCRIPTION - ORIENTATION: ORIENTATION: RIGHT

## 2021-04-03 NOTE — PROGRESS NOTES
Physical Therapy  Facility/Department: [facility]  Daily Treatment Note  NAME: Rajendra Corrales  : 1950  MRN: [id number]    Date of Service: 4/3/2021    Discharge Recommendations:  24 hour supervision or assist, Home with Home health PT    PT Equipment Recommendations  Equipment Needed: Yes  Mobility Devices: Alivia Shania; Wheelchair  Walker: Rolling (with L clamshell)  Wheelchair: Standard (with elevating and removable leg rests and swing away armrests)    Assessment   Body structures, Functions, Activity limitations: Decreased functional mobility ; Decreased balance;Decreased strength;Decreased coordination;Decreased endurance  Assessment: Pt was pleasant and agreeable to PT this morning, reporting mild difficulty sleeping last night and moderate stomach pain this AM. Pt was able to complete functional t/f, therapeutic exercise on the SCIFIT to encourage reciprocal gait pattern, and dynamic standing balance activities in the // bars with grossly CGA-Piotr. Pt is limited by functional activity tolerance and will benefit from continued skilled PT to challenge endurance and strength at the ARU. Treatment Diagnosis: Impaired functional mobilty  Prognosis: Good  Decision Making: Medium Complexity  PT Education: Goals;PT Role;General Safety;Home Exercise Program;Transfer Training;Precautions  Patient Education: PT verbalized cuing for pt to step reciprocally during w/c mobility and for placement of LLE during t/f and therapeutic exercise. Pt verbalized understanding and will benefit from continued PT education. Barriers to Learning: none  REQUIRES PT FOLLOW UP: Yes  Activity Tolerance  Activity Tolerance: Patient limited by endurance; Patient Tolerated treatment well  Activity Tolerance: Pt tolerated tx well, but demonstrated fatigue towards end of session with notable increase in LLE neglect during w/c mobility and increased assist in SPT w/c > bed. Patient Diagnosis(es): There were no encounter diagnoses.      has a past medical history of Diabetes mellitus ([facility] [address] GERD (gastroesophageal reflux disease), and Hypertension. has no past surgical history on file. Restrictions  Restrictions/Precautions  Restrictions/Precautions: General Precautions, Fall Risk  Position Activity Restriction  Other position/activity restrictions: up with assist  Subjective   General  Chart Reviewed: Yes  Response To Previous Treatment: Patient with no complaints from previous session. Family / Caregiver Present: No  Referring Practitioner: Cathy Ramirez MD  Subjective  Subjective: Pt is pleasant and agreeable to PT this AM, reporting mild difficulty sleeping and moderate stomach pain. Pain Screening  Patient Currently in Pain: Yes  Pain Assessment  Pain Location: (Stomach)  Vital Signs  Patient Currently in Pain: Yes       Orientation  Orientation  Overall Orientation Status: Within Normal Limits       Objective   Bed mobility  Supine to Sit: Contact guard assistance;Minimal assistance, HOB elevated with BR assist provided at trunk & shoulders  Sit to Supine: Contact guard assistance;Stand by assistance, HOB elevated use of BR  Scooting: Moderate assistance;Minimal assistance  Comment: Pt was grossly CGA-Piotr for bed mobility, requiring modA to scoot in bed at end of session secondary to fatigue. Transfers  Sit to Stand: Contact guard assistance;Minimal Assistance  Stand to sit: Contact guard assistance;Minimal Assistance  Stand Pivot Transfers: Contact guard assistance;Minimal Assistance  Comment: Pt is grossly CGA-Piotr during EOB <> w/c no AD, w/c <> SCIFIT no AD, and sit <>  // bars.       Ambulation  Ambulation?: No  Stairs/Curb  Stairs?: No  Wheelchair Activities  Left Brakes Level of Assistance: SBA  Right Brakes Level of Assistance: SBA  Propulsion: Yes  Propulsion 1  Propulsion: Manual  Level: Level Tile  Method: LLE;RLE;RUE  Level of Assistance: Supervision  Description/ Details: Pt required mod cuing from PT to step reciprocally with LLE during w/c propulsion. Pt was able to navigate through doorways and multiple turns with increased time and no additional assist.  Distance: 120' + 50'      Balance:  Pt standing in // bars with PT CGA-Piotr for LLE blocking and balance support. Pt performed 3x bilateral reaching cone task with WB through contralateral UE and BLE. Completed to improve motor control through UE, improve standing endurance tolerance, and challenge balance during reaching task. PT provided postural cuing during activity and assistance with reaching task to promote successful trials. Therapeutic Exercise:   Pt completed x 8 minutes on SCIFIT level 1 with BUE/BLE to improve cardiorespiratory endurance and improve gait through reciprocal UE and LE movements. LUE ace wrap used to maintain hand  during activity. Pt required moderate verbal/tactile cuing to decrease L hip ER/ABD throughout activity. Goals  Short term goals  Time Frame for Short term goals: 1 week, 3/26/21  Short term goal 1: Pt will complete bed mobility with SBA and no rails. -- GOAL PARTIALLY MET 4/2: Supine to sit CGA with HOB flat and no rails. Short term goal 2: Pt will complete sit<>stand with Min A with LRAD. -- GOAL MET Piotr to CGA with RW  Short term goal 3: Pt will ambulate 100 feet with LRAD and Mod Ax1. -- GOAL NOT MET but progressing Piotr x 2 up to 48' with RW  Short term goal 4: Pt will complete bed<>chair transfer with LRAD and Min A. -- GOAL MET Piotr to CGA with RW  Short term goal 5: Pt will complete 150 feet of w/c mobility with multiple turns and IND. 4/2: 150 feet with SBA  Long term goals  Time Frame for Long term goals : 2 weeks, by 4/2/21; updated to 4/10 per planned d/c date  Long term goal 1: Pt will complete bed mobility with independence in flat bed without rails. Long term goal 2: Pt will complete sit<>stands with independence and LRAD.   Long term goal 3: Pt will complete bed<>Chair transfer with LRAD and independence. Long term goal 4: Pt will ambulate 100 feet with LRAD and independence. Long term goal 5: Pt will ascend/descend 4 steps with bilateral rails and CGA. Patient Goals   Patient goals : To get stronger and go home.     Plan    Plan  Times per week: 5-7  Times per day: Daily  Plan weeks: 2 weeks, by 4/2/21  Current Treatment Recommendations: Strengthening, Balance Training, Transfer Training, Neuromuscular Re-education, Home Exercise Program, Endurance Training, Gait Training, Functional Mobility Training, Wheelchair Mobility Training  Safety Devices  Type of devices: Bed alarm in place, Call light within reach, Left in bed, Gait belt, Nurse notified, All fall risk precautions in place     Therapy Time   Individual Concurrent Group Co-treatment   Time In 0800         Time Out 0830         Minutes 30         Timed Code Treatment Minutes: [address], [place]

## 2021-04-03 NOTE — PROGRESS NOTES
[address] [facility]     Rehab Dx/Hx: Acute ischemic stroke [facility]) [I63.9]   SZU:4/11/9842  [facility]:[id number]  Date of Admit: 3/19/2021  Room #: Jenifer Womack is on Physical Therapy's weekend walker list.     Patient participated in [facility]?:   [] Yes; see information below. [x] No; patient refused participation due to pt requested to remain in bed during meals. Scheduled Therapies this weekend?:  [x] Yes, patient received scheduled therapies in addition to weekend walker activity  [] No, weekend therapy was not regularly scheduled for this patient    Activity Completed:   [x] Walking: 10 feet total  [] Wheelchair mobility:  feet  [] Sat up in chair: 0  [] Sat up in chair for meals  [] Other:     Assistance needed:   [] No assist / independent  [] Supervision / Minimal assist  [x] Max assistance  [] Other:     Device used:   [] Marya Tejeda:  type of walker  [] Cane  [] Wheelchair  [x] None  [] Other    Patient tolerated stand/pivot to bedside commode multiple times this shift with staff assistance. Pt requested to remain in bed for meals. Will monitor.      Signature:

## 2021-04-04 LAB
GLUCOSE BLD-MCNC: 121 MG/DL (ref 70–99)
GLUCOSE BLD-MCNC: 126 MG/DL (ref 70–99)
GLUCOSE BLD-MCNC: 130 MG/DL (ref 70–99)
GLUCOSE BLD-MCNC: 130 MG/DL (ref 70–99)
GLUCOSE BLD-MCNC: 138 MG/DL (ref 70–99)
PERFORMED ON: ABNORMAL

## 2021-04-04 PROCEDURE — 6370000000 HC RX 637 (ALT 250 FOR IP): Performed by: PHYSICAL MEDICINE & REHABILITATION

## 2021-04-04 PROCEDURE — 6360000002 HC RX W HCPCS: Performed by: PHYSICAL MEDICINE & REHABILITATION

## 2021-04-04 PROCEDURE — 1280000000 HC REHAB R&B

## 2021-04-04 RX ADMIN — AMLODIPINE BESYLATE 10 MG: 5 TABLET ORAL at 07:47

## 2021-04-04 RX ADMIN — BUSPIRONE HYDROCHLORIDE 5 MG: 5 TABLET ORAL at 07:48

## 2021-04-04 RX ADMIN — BUSPIRONE HYDROCHLORIDE 5 MG: 5 TABLET ORAL at 20:42

## 2021-04-04 RX ADMIN — CETIRIZINE HYDROCHLORIDE 10 MG: 10 TABLET, FILM COATED ORAL at 07:48

## 2021-04-04 RX ADMIN — ALLOPURINOL 300 MG: 300 TABLET ORAL at 07:48

## 2021-04-04 RX ADMIN — ASPIRIN 81 MG: 81 TABLET, CHEWABLE ORAL at 07:48

## 2021-04-04 RX ADMIN — ATORVASTATIN CALCIUM 80 MG: 80 TABLET, FILM COATED ORAL at 20:42

## 2021-04-04 RX ADMIN — INSULIN GLARGINE 20 UNITS: 100 INJECTION, SOLUTION SUBCUTANEOUS at 20:43

## 2021-04-04 RX ADMIN — BUSPIRONE HYDROCHLORIDE 5 MG: 5 TABLET ORAL at 14:26

## 2021-04-04 RX ADMIN — ENOXAPARIN SODIUM 40 MG: 40 INJECTION SUBCUTANEOUS at 07:47

## 2021-04-04 RX ADMIN — Medication 5 MG: at 20:42

## 2021-04-04 RX ADMIN — PROPRANOLOL HYDROCHLORIDE 80 MG: 40 TABLET ORAL at 07:48

## 2021-04-04 ASSESSMENT — PAIN SCALES - GENERAL
PAINLEVEL_OUTOF10: 0
PAINLEVEL_OUTOF10: 0

## 2021-04-04 NOTE — PLAN OF CARE
Problem: Pain:  Goal: Pain level will decrease  Description: Pain level will decrease  Outcome: Ongoing  Goal: Control of acute pain  Description: Control of acute pain  Outcome: Ongoing  Goal: Control of chronic pain  Description: Control of chronic pain  Outcome: Ongoing     Problem: Skin Integrity:  Goal: Will show no infection signs and symptoms  Description: Will show no infection signs and symptoms  Outcome: Ongoing  Goal: Absence of new skin breakdown  Description: Absence of new skin breakdown  Outcome: Ongoing     Problem: Falls - Risk of:  Goal: Will remain free from falls  Description: Will remain free from falls  4/4/2021 0940 by Luis Miguel Carpio RN  Outcome: Ongoing  4/3/2021 2155 by Gary Schmidt RN  Outcome: Ongoing  Goal: Absence of physical injury  Description: Absence of physical injury  Outcome: Ongoing     Problem: Nutrition  Goal: Optimal nutrition therapy  Outcome: Ongoing

## 2021-04-05 LAB
ANION GAP SERPL CALCULATED.3IONS-SCNC: 8 MMOL/L (ref 3–16)
BUN BLDV-MCNC: 27 MG/DL (ref 7–20)
CALCIUM SERPL-MCNC: 11.1 MG/DL (ref 8.3–10.6)
CHLORIDE BLD-SCNC: 102 MMOL/L (ref 99–110)
CO2: 30 MMOL/L (ref 21–32)
CREAT SERPL-MCNC: 1.1 MG/DL (ref 0.6–1.2)
GFR AFRICAN AMERICAN: 59
GFR NON-AFRICAN AMERICAN: 49
GLUCOSE BLD-MCNC: 106 MG/DL (ref 70–99)
GLUCOSE BLD-MCNC: 109 MG/DL (ref 70–99)
GLUCOSE BLD-MCNC: 130 MG/DL (ref 70–99)
GLUCOSE BLD-MCNC: 82 MG/DL (ref 70–99)
GLUCOSE BLD-MCNC: 91 MG/DL (ref 70–99)
HCT VFR BLD CALC: 41 % (ref 36–48)
HEMOGLOBIN: 14 G/DL (ref 12–16)
MCH RBC QN AUTO: 28.4 PG (ref 26–34)
MCHC RBC AUTO-ENTMCNC: 34.1 G/DL (ref 31–36)
MCV RBC AUTO: 83.1 FL (ref 80–100)
PDW BLD-RTO: 14.4 % (ref 12.4–15.4)
PERFORMED ON: ABNORMAL
PERFORMED ON: ABNORMAL
PERFORMED ON: NORMAL
PERFORMED ON: NORMAL
PLATELET # BLD: 131 K/UL (ref 135–450)
PMV BLD AUTO: 7.8 FL (ref 5–10.5)
POTASSIUM SERPL-SCNC: 4 MMOL/L (ref 3.5–5.1)
RBC # BLD: 4.94 M/UL (ref 4–5.2)
SODIUM BLD-SCNC: 140 MMOL/L (ref 136–145)
WBC # BLD: 4.6 K/UL (ref 4–11)

## 2021-04-05 PROCEDURE — 97530 THERAPEUTIC ACTIVITIES: CPT

## 2021-04-05 PROCEDURE — 1280000000 HC REHAB R&B

## 2021-04-05 PROCEDURE — 97116 GAIT TRAINING THERAPY: CPT

## 2021-04-05 PROCEDURE — 6360000002 HC RX W HCPCS: Performed by: PHYSICAL MEDICINE & REHABILITATION

## 2021-04-05 PROCEDURE — 97112 NEUROMUSCULAR REEDUCATION: CPT

## 2021-04-05 PROCEDURE — 6370000000 HC RX 637 (ALT 250 FOR IP): Performed by: PHYSICAL MEDICINE & REHABILITATION

## 2021-04-05 PROCEDURE — 85027 COMPLETE CBC AUTOMATED: CPT

## 2021-04-05 PROCEDURE — 80048 BASIC METABOLIC PNL TOTAL CA: CPT

## 2021-04-05 PROCEDURE — 36415 COLL VENOUS BLD VENIPUNCTURE: CPT

## 2021-04-05 PROCEDURE — 97535 SELF CARE MNGMENT TRAINING: CPT

## 2021-04-05 RX ORDER — DOCUSATE SODIUM 100 MG/1
100 CAPSULE, LIQUID FILLED ORAL 2 TIMES DAILY PRN
Status: DISCONTINUED | OUTPATIENT
Start: 2021-04-05 | End: 2021-04-10 | Stop reason: HOSPADM

## 2021-04-05 RX ADMIN — DOCUSATE SODIUM 100 MG: 100 CAPSULE, LIQUID FILLED ORAL at 14:24

## 2021-04-05 RX ADMIN — ENOXAPARIN SODIUM 40 MG: 40 INJECTION SUBCUTANEOUS at 07:48

## 2021-04-05 RX ADMIN — BUSPIRONE HYDROCHLORIDE 5 MG: 5 TABLET ORAL at 14:19

## 2021-04-05 RX ADMIN — BUSPIRONE HYDROCHLORIDE 5 MG: 5 TABLET ORAL at 20:10

## 2021-04-05 RX ADMIN — AMLODIPINE BESYLATE 10 MG: 5 TABLET ORAL at 07:48

## 2021-04-05 RX ADMIN — INSULIN GLARGINE 20 UNITS: 100 INJECTION, SOLUTION SUBCUTANEOUS at 20:10

## 2021-04-05 RX ADMIN — Medication 5 MG: at 20:10

## 2021-04-05 RX ADMIN — ALLOPURINOL 300 MG: 300 TABLET ORAL at 07:49

## 2021-04-05 RX ADMIN — ASPIRIN 81 MG: 81 TABLET, CHEWABLE ORAL at 07:49

## 2021-04-05 RX ADMIN — BUSPIRONE HYDROCHLORIDE 5 MG: 5 TABLET ORAL at 07:50

## 2021-04-05 RX ADMIN — PROPRANOLOL HYDROCHLORIDE 80 MG: 40 TABLET ORAL at 07:49

## 2021-04-05 RX ADMIN — CETIRIZINE HYDROCHLORIDE 10 MG: 10 TABLET, FILM COATED ORAL at 07:49

## 2021-04-05 RX ADMIN — ATORVASTATIN CALCIUM 80 MG: 80 TABLET, FILM COATED ORAL at 20:10

## 2021-04-05 ASSESSMENT — PAIN SCALES - GENERAL
PAINLEVEL_OUTOF10: 0
PAINLEVEL_OUTOF10: 0

## 2021-04-05 NOTE — PROGRESS NOTES
Cleo Reach  4/5/2021  [id number]    Chief Complaint: Acute ischemic stroke [facility])    Subjective:   Had an episode of aphasia overnight on Saturday night that resolved spontaneously. ROS: no n/v, cp, sob, f/c  Objective:  Patient Vitals for the past 24 hrs:   BP Temp Temp src Pulse Resp SpO2   04/05/21 0730 (!) 144/77   72 16 99 %   04/04/21 2030 (!) 144/76 98.1 °F (36.7 °C) Oral 74 16 98 %     Gen: No distress, pleasant. HEENT: Normocephalic, atraumatic. CV: Regular rate and rhythm. Resp: No respiratory distress. Abd: Soft, nontender   Ext: No edema. Neuro: Alert, oriented, appropriately interactive. Wt Readings from Last 3 Encounters:   04/04/21 140 lb 3.4 oz (63.6 kg)   03/16/21 162 lb (73.5 kg)   07/14/20 158 lb 14.4 oz (72.1 kg)       Laboratory data:   Lab Results   Component Value Date    WBC 4.6 04/05/2021    HGB 14.0 04/05/2021    HCT 41.0 04/05/2021    MCV 83.1 04/05/2021     (L) 04/05/2021       Lab Results   Component Value Date     04/05/2021    K 4.0 04/05/2021    K 3.4 07/14/2020     04/05/2021    CO2 30 04/05/2021    BUN 27 04/05/2021    CREATININE 1.1 04/05/2021    GLUCOSE 106 04/05/2021    CALCIUM 11.1 04/05/2021        Therapy progress:  PT  Position Activity Restriction  Other position/activity restrictions: up with assist  Objective     Sit to Stand: Contact guard assistance, Minimal Assistance  Stand to sit: Contact guard assistance, Minimal Assistance  Bed to Chair: Minimal assistance, Moderate assistance  Device: Rolling Walker  Other Apparatus: Wheelchair follow  Assistance: Minimal assistance, 2 Person assistance, Moderate assistance  Distance: 50 feet + 35 feet  OT  PT Equipment Recommendations  Equipment Needed: Yes  Mobility Devices: Velta Aver, Wheelchair  Walker: Rolling(With L clamshell)  Wheelchair: Standard(with elevating and removable leg rests and swing away armrests)  Other: Continue to assess pending pt's progress.  Potentially requiring RW

## 2021-04-05 NOTE — CARE COORDINATION
ELLIS met with the Pt at Bedside. She reported that her spouse will be in tomorrow for training. ELLIS spoke with the therapist Nelsy Berman and she and PT will get orders for DME to ELLIS and  to sign so that it can be faxed to [facility].   CRISTIAN Acosta

## 2021-04-05 NOTE — PROGRESS NOTES
(gastroesophageal reflux disease), and Hypertension. has no past surgical history on file. Restrictions  Restrictions/Precautions  Restrictions/Precautions: General Precautions, Fall Risk  Position Activity Restriction  Other position/activity restrictions: up with assist  Subjective   General  Chart Reviewed: Yes  Response To Previous Treatment: Patient with no complaints from previous session. Family / Caregiver Present: No  Referring Practitioner: Louisa Quintanilla MD  Subjective  Subjective: Pt is pleasant and agreeable to PT this AM, reporting that she slept well last night positioned upside down in bed. Pain Screening  Patient Currently in Pain: No  Vital Signs  BP: (!) 144/77  BP Location: Right upper arm  Patient Position: Sitting  Level of Consciousness: Alert (0)  Patient Currently in Pain: No  Oxygen Therapy  O2 Device: None (Room air)       Orientation  Orientation  Overall Orientation Status: Within Normal Limits    Objective   Bed mobility  Supine to Sit: S to SBA (HOB elevated, use of BR)  Scooting: Moderate assistance;Minimal assistance  Comment:Band CGA-Piotr for sit-supine with dressing at bedside. Pt required min-modA scooting in chair at the end of therapy session secondary to fatigue. Transfers  Sit to Stand: Contact guard assistance;Minimal Assistance  Stand to sit: Contact guard assistance;Minimal Assistance  Bed to Chair: Contact guard assistance;Minimal assistance  Comment: Pt was CGA-Piotr for sit <> stand t/f from w/c to // bars and sink. Pt required Piotr in standing at bedside for dressing. Pt required therapist cuing to lock wheelchair brakes prior to standing and feet positioning prior to standing. Ambulation  Stairs/Curb  Stairs?: Yes  Stairs  # Steps : 1  Rails: Bilateral  Curbs: 4\"  Device: (// bars)  Assistance: Moderate assistance  Comment: Pt performed 4\" curb stepping in // bars with therapist facilitation of WS.  Pt completes x 2 set x 5 reps L/R toe tap with partial WS to 4\" step. Pt completes x 2 reps step up/down onto 4\" step in //bars. Pt required cuing for posture and instruction to step up with R & down with L. Pt voiced understanding and demonstrated with weakness and difficulty with hip flx of LLE. Pt responded well to cuing to \"bring knee to chest\" to facilitate hip flx. Wheelchair Activities  Wheelchair Type: Standard  Wheelchair Cushion: Standard  Wheelchair Parts Management: Yes  Left Brakes Level of Assistance: Supervision(Pt required therapist cuing to lock w/c brakes)  Right Brakes Level of Assistance: Supervision(Pt required therapist cuing to lock w/c brakes)  Propulsion: Yes  Propulsion 1  Propulsion: Manual  Level: Level Tile  Method: LLE;RLE;RUE  Level of Assistance: Supervision  Description/ Details: Pt required cuing for LUE management. Pt demonstrated good reciprocal stepping pattern w/ BLE. Distance: 100'     Balance  Posture: Fair  Sitting - Static: Good  Sitting - Dynamic: Fair  Standing - Static: Fair  Standing - Dynamic: Fair;-  Comments: Dynamic standing balance for dressing task with Piotr with PT assisting dressing. Pt stood at sink for 5 min brushing teeth with min-modA from PT to assist balance and weight shift. Pt demonstrates knee hyperextension in stance and responded well to therapist cuing for WS. Addendum: 2nd session  Pt supine in bed on approach, pleasant and agreeable to PT tx with no c/o pain. Pt participates in co-tx with OT secondary to complexity of condition, current deficits and BARON. Pt benefits from x 2 skilled hands to provide increased feedback and cues and promote improved performance with gait, dynamic balance and functional mobility.   Bed mobility:  Supine to/from sit S with HOB elevated and use of BR  Transfers:   SPT EOB to w/c CGA   Sit to/from stand w/c to RW with L clasangeethal CGA completed x 6 reps   Cues for hand placement, BLE placement and technique  Gait:  Ambulation 1  Surface: level tile Device: Rolling Walker  Other Apparatus: Wheelchair follow, LLE AFO and knee hyperextension wrap, 1.5 lb weight LLE. Assistance: Minimal assistance;2 Person assistance  Quality of Gait: Step to gait pattern, leading with LLE, ataxic gait with decreased coordination and motor planning LLE. Decreased stance time on the left. BLE genu recurvatuum. Pt mildly unsteady no overt LOB. Gait Deviations: Slow Lisa;Decreased step length;Decreased step height;Shuffles;Staggers;Decreased head and trunk rotation  Distance: 20' forwards/backwards with beam to widen ROYCE. + 40'  Pt assisting at LLE to facilitate decreased knee hyperextension and improve gait mechanics. OT assisting with balance. Seated rest break between bouts. Balance:  Pt completes x 3 bout dynamic balance activities with RW and grossly CGA to Piotr x 2 for balance. BUE cone reach to CL side, crossing midline and stacking to IL side x 8 reps each, increased time with LUE and cues for improved grasp. RUE cone reach to CL side, crossing midline and stacking to IL side x 4 reps while standing on blue air-ex pad, limited by pt reporting feeling of increased unsteadiness and requesting to discontinue activity. X 4 minutes BUE ball catch/toss with BUE release and cues to use LUE to assist.   PT providing tactile cues and blocking to L knee intermittently to maintain extension and prevent hyperextension. OT assisting with balance and facilitation of LUE movement. Pt requires seated rest break between bouts. Pt supine in bed at end of session with OT present in room. Goals  Short term goals  Time Frame for Short term goals: 1 week, 3/26/21  Short term goal 1: Pt will complete bed mobility with SBA and no rails. -- GOAL PARTIALLY MET 4/5: Supine to sit SBA with raised HOB and rails. Short term goal 2: Pt will complete sit<>stand with Min A with LRAD.  -- GOAL MET Piotr to CGA with RW  Short term goal 3: Pt will ambulate 100 feet with LRAD and Mod Ax1. -- GOAL NOT MET but progressing Piotr x 2 up to 48' with RW  Short term goal 4: Pt will complete bed<>chair transfer with LRAD and Min A. -- GOAL MET Piotr to CGA with RW  Short term goal 5: Pt will complete 150 feet of w/c mobility with multiple turns and IND. 4/2: 150 feet with SBA  Long term goals  Time Frame for Long term goals : 2 weeks, by 4/2/21; updated to 4/10 per planned d/c date  Long term goal 1: Pt will complete bed mobility with independence in flat bed without rails. Long term goal 2: Pt will complete sit<>stands with independence and LRAD. Long term goal 3: Pt will complete bed<>Chair transfer with LRAD and independence. Long term goal 4: Pt will ambulate 100 feet with LRAD and independence. Long term goal 5: Pt will ascend/descend 4 steps with bilateral rails and CGA. Patient Goals   Patient goals : To get stronger and go home.     Plan    Plan  Times per week: 5-7  Times per day: Daily  Plan weeks: 2 weeks, by 4/2/21  Current Treatment Recommendations: Strengthening, Balance Training, Transfer Training, Neuromuscular Re-education, Home Exercise Program, Endurance Training, Gait Training, Functional Mobility Training, Wheelchair Mobility Training  Plan Comment: progress functional mobility as tolerated  Safety Devices  Type of devices: Call light within reach, Gait belt, Nurse notified, All fall risk precautions in place, Chair alarm in place, Left in chair     Therapy Time   Individual Concurrent Group Co-treatment   Time In 0830         Time Out 0900         Minutes 30         Timed Code Treatment Minutes: 30 Minutes     Second Session Therapy Time:   Individual Concurrent Group Co-treatment   Time In 0830     1230   Time Out 0900     1330   Minutes 30     60     Timed Code Treatment Minutes:  60 minutes    Total Treatment Minutes:  90 minutes    [address] [place] [address] [address], [address] [place] [address] PT, DPT

## 2021-04-06 LAB
GLUCOSE BLD-MCNC: 132 MG/DL (ref 70–99)
GLUCOSE BLD-MCNC: 137 MG/DL (ref 70–99)
GLUCOSE BLD-MCNC: 86 MG/DL (ref 70–99)
GLUCOSE BLD-MCNC: 99 MG/DL (ref 70–99)
PERFORMED ON: ABNORMAL
PERFORMED ON: ABNORMAL
PERFORMED ON: NORMAL
PERFORMED ON: NORMAL

## 2021-04-06 PROCEDURE — 97112 NEUROMUSCULAR REEDUCATION: CPT

## 2021-04-06 PROCEDURE — 97542 WHEELCHAIR MNGMENT TRAINING: CPT

## 2021-04-06 PROCEDURE — 97530 THERAPEUTIC ACTIVITIES: CPT

## 2021-04-06 PROCEDURE — 6370000000 HC RX 637 (ALT 250 FOR IP): Performed by: PHYSICAL MEDICINE & REHABILITATION

## 2021-04-06 PROCEDURE — 97535 SELF CARE MNGMENT TRAINING: CPT

## 2021-04-06 PROCEDURE — 97116 GAIT TRAINING THERAPY: CPT

## 2021-04-06 PROCEDURE — 1280000000 HC REHAB R&B

## 2021-04-06 PROCEDURE — 6360000002 HC RX W HCPCS: Performed by: PHYSICAL MEDICINE & REHABILITATION

## 2021-04-06 RX ADMIN — INSULIN GLARGINE 20 UNITS: 100 INJECTION, SOLUTION SUBCUTANEOUS at 20:19

## 2021-04-06 RX ADMIN — PROPRANOLOL HYDROCHLORIDE 80 MG: 40 TABLET ORAL at 08:07

## 2021-04-06 RX ADMIN — AMLODIPINE BESYLATE 10 MG: 5 TABLET ORAL at 08:07

## 2021-04-06 RX ADMIN — ENOXAPARIN SODIUM 40 MG: 40 INJECTION SUBCUTANEOUS at 08:08

## 2021-04-06 RX ADMIN — CETIRIZINE HYDROCHLORIDE 10 MG: 10 TABLET, FILM COATED ORAL at 08:07

## 2021-04-06 RX ADMIN — ASPIRIN 81 MG: 81 TABLET, CHEWABLE ORAL at 08:07

## 2021-04-06 RX ADMIN — ALLOPURINOL 300 MG: 300 TABLET ORAL at 08:08

## 2021-04-06 RX ADMIN — BUSPIRONE HYDROCHLORIDE 5 MG: 5 TABLET ORAL at 19:34

## 2021-04-06 RX ADMIN — BUSPIRONE HYDROCHLORIDE 5 MG: 5 TABLET ORAL at 08:07

## 2021-04-06 RX ADMIN — BUSPIRONE HYDROCHLORIDE 5 MG: 5 TABLET ORAL at 14:08

## 2021-04-06 RX ADMIN — Medication 5 MG: at 19:34

## 2021-04-06 RX ADMIN — ATORVASTATIN CALCIUM 80 MG: 80 TABLET, FILM COATED ORAL at 19:34

## 2021-04-06 NOTE — CARE COORDINATION
ELLIS spoke with Pt and spouse while working with OT. ELLIS went over DME needs for the Pt at [facility]. ELLIS informed the Pt spouse that insurance does not cover ROyland Walker Splint and a toilet Safety frame. Pt spouse will order these items and any others that are not covered by Insurance. ELLIS faxed to [facility] line the order for all DME without DR order and will refax when DR signs it.    CRISTIAN Reyes

## 2021-04-06 NOTE — PROGRESS NOTES
Comprehensive Nutrition Assessment    Type and Reason for Visit:  Reassess    Nutrition Recommendations/Plan:   1. Continue carb control diet   2. Trial Boost Pudding BID   3. Monitor nutrition adequacy, pertinent labs, bowel habits, wt changes, and clinical progress    Nutrition Assessment:  Follow up: Pt nutritionally declining AEB continued weight loss and decreased appetite. Pt reports appetite decreasing since last visit. Pt willing to try Boost Pudding. Encouraged PO intakes and importance of nutrition for LBM maintenance. Will continue to monitor. Malnutrition Assessment:  Malnutrition Status: At risk for malnutrition (Comment)    Context:  Acute Illness       Estimated Daily Nutrient Needs:  Energy (kcal):  1593-1729kcal; Weight Used for Energy Requirements:  Ideal(45.5kg)     Protein (g):  55-68g; Weight Used for Protein Requirements:  Ideal(1.2-1.5)        Fluid (ml/day):   ; Method Used for Fluid Requirements:  1 ml/kcal      Nutrition Related Findings:  BG stable. Active BS. BM x1 on 4/6. Wounds:  (redness on buttocks, scattered abrasions)       Current Nutrition Therapies:    DIET GENERAL; Carb Control: 4 carb choices (60 gms)/meal    Anthropometric Measures:  · Height: 5' (152.4 cm)  · Current Body Weight: 140 lb (63.5 kg)   · Admission Body Weight: 153 lb (69.4 kg)    · Ideal Body Weight: 100 lbs;   · BMI: 27.3  · BMI Categories: Overweight (BMI 25.0-29. 9)       Nutrition Diagnosis:   · Inadequate oral intake related to inadequate protein-energy intake as evidenced by intake 26-50%, weight loss      Nutrition Interventions:   Food and/or Nutrient Delivery:  Continue Current Diet, Start Oral Nutrition Supplement  Nutrition Education/Counseling:  Education completed   Coordination of Nutrition Care:  Continue to monitor while inpatient    Goals:  Patient will eat 50% or greater of meals.        Nutrition Monitoring and Evaluation:   Behavioral-Environmental Outcomes:  Knowledge or Skill Food/Nutrient Intake Outcomes:  Food and Nutrient Intake, Supplement Intake  Physical Signs/Symptoms Outcomes:  Biochemical Data, Nutrition Focused Physical Findings, Weight, GI Status, Constipation     Discharge Planning:    Continue current diet     Electronically signed by Aileen Rico MS, RD, LD on 4/6/21 at 1:11 PM EDT    Contact: [phone]

## 2021-04-06 NOTE — PLAN OF CARE
Problem: Nutrition  Goal: Optimal nutrition therapy  Outcome: Ongoing  Note: Nutrition Problem #1: Inadequate oral intake  Intervention: Food and/or Nutrient Delivery: Continue Current Diet, Start Oral Nutrition Supplement  Nutritional Goals: Patient will eat 50% or greater of meals.

## 2021-04-06 NOTE — CARE COORDINATION
[facility]    Referral received from  to follow for home care services.    [facility]  Will find alternate agency in network  Referral Call placed to Jaclyn Velez at [address] [phone] accepted referral    Aris Kim RN, BSN CTN  [facility] [phone]

## 2021-04-06 NOTE — PROGRESS NOTES
[facility]  4/6/2021  [id number]    Chief Complaint: Acute ischemic stroke ([address]    Subjective:   No issues overnight. Resting well. No complaints. ROS: no n/v, cp, sob, f/c  Objective:  Patient Vitals for the past 24 hrs:   BP Temp Temp src Pulse Resp SpO2   04/06/21 0800 112/68 98.1 °F (36.7 °C) Oral 67 16 98 %   04/05/21 2000 120/73 97.9 °F (36.6 °C) Oral 63 16 96 %   04/05/21 0911 (!) 144/77          Gen: No distress, pleasant. HEENT: Normocephalic, atraumatic. CV: Regular rate and rhythm. Resp: No respiratory distress. Abd: Soft, nontender   Ext: No edema. Neuro: Alert, oriented, appropriately interactive. Wt Readings from Last 3 Encounters:   04/04/21 140 lb 3.4 oz (63.6 kg)   03/16/21 162 lb (73.5 kg)   07/14/20 158 lb 14.4 oz (72.1 kg)       Laboratory data:   Lab Results   Component Value Date    WBC 4.6 04/05/2021    HGB 14.0 04/05/2021    HCT 41.0 04/05/2021    MCV 83.1 04/05/2021     (L) 04/05/2021       Lab Results   Component Value Date     04/05/2021    K 4.0 04/05/2021    K 3.4 07/14/2020     04/05/2021    CO2 30 04/05/2021    BUN 27 04/05/2021    CREATININE 1.1 04/05/2021    GLUCOSE 106 04/05/2021    CALCIUM 11.1 04/05/2021        Therapy progress:  PT  Position Activity Restriction  Other position/activity restrictions: up with assist  Objective     Sit to Stand: Contact guard assistance, Minimal Assistance  Stand to sit: Contact guard assistance, Minimal Assistance  Bed to Chair: Contact guard assistance, Minimal assistance  Device: Rolling Walker  Other Apparatus: Wheelchair follow  Assistance: Minimal assistance, 2 Person assistance, Moderate assistance  Distance: 50 feet + 35 feet  OT  PT Equipment Recommendations  Equipment Needed: Yes  Mobility Devices: Sawyer Brandon Wheel  Walker: Rolling  Wheelchair: Standard  Other: Continue to assess pending pt's progress. Potentially requiring RW with [facility] and manual w/c.   Toilet - Technique: Stand step, To right, To left  Equipment Used: Standard bedside commode  Toilet Transfers Comments: max cueing for safety and sequencing of movements  Assessment        SLP  Current Diet : Regular  Current Liquid Diet : Thin  Diet Solids Recommendation: Regular  Liquid Consistency Recommendation: Thin    Body mass index is 27.38 kg/m². Assessment and Plan:  Acute ischemic infarct: ASA, statin.  PT/OT/SLP. Transient aphasia could be mild recrudescence vs TIA; follow.     Diabetes: control improving, Lantus 20, SSI;      HTN: propranolol 80; increased norvasc to 10     HLD: significantly elevated triglycerides, Lipitor 80     LEX: Push PO.  Patient prefers no IVF     Bowel: Per protocol  Bladder: Per protocol  Sleep: Trazodone PRN  Pain: tylenol, tramadol  DVT PPx: lovenox  CECILY: 4/10 home w/ home health  DME: Possible AFO and hyperextension knee brace, RW, manual wc with ELR and swing away arm rests, sacral cutout cushion, gait belt, ttb, grab bars     Tomas Carvajal MD  04/06/21  9:05 AM

## 2021-04-06 NOTE — PROGRESS NOTES
Occupational Therapy  Facility/Department: [facility]  Daily Treatment Note  NAME: Abril Petersen  : 1950  MRN: [id number]    Date of Service: 2021    Discharge Recommendations:  24 hour supervision or assist, Home with Home health OT  OT Equipment Recommendations  Other: CTA pending further family training    Assessment   Performance deficits / Impairments: Decreased functional mobility ; Decreased endurance;Decreased coordination;Decreased ADL status; Decreased posture;Decreased ROM; Decreased balance;Decreased strength;Decreased safe awareness;Decreased high-level IADLs;Decreased cognition;Decreased vision/visual deficit; Decreased fine motor control  Assessment: Pt continues to be very pleasant and motivated to participate in therapy, with pt's  present in afternoon sessions to receive education on assisting pt with ADL, brief functional mobility and functional transfers at d/c. Pt and  asked appropriate questions and verbalized understanding of all education provided. Pt's  to attend further family training next date, to include negotiating small step with PT, LE ADL, and HEPs. Continue OT tx.  OT Education: OT Role;Plan of Care;Transfer Training;ADL Adaptive Strategies; Equipment; Energy Conservation;Precautions;Orientation;IADL Safety;Home Exercise Program  Patient Education: Importance of mobility and engaging LUE, LUE positioning, body awareness  REQUIRES OT FOLLOW UP: Yes  Activity Tolerance  Activity Tolerance: Patient Tolerated treatment well  Activity Tolerance: /68, HR 67, Spo2 98% on RA  Safety Devices  Safety Devices in place: Yes  Type of devices: Call light within reach;Gait belt;Nurse notified; Left in bed;Bed alarm in place         Patient Diagnosis(es): There were no encounter diagnoses. has a past medical history of Diabetes mellitus ([facility] [address] GERD (gastroesophageal reflux disease), and Hypertension. has no past surgical history on file.     Restrictions Restrictions/Precautions  Restrictions/Precautions: General Precautions, Fall Risk  Required Braces or Orthoses?: Yes  Required Braces or Orthoses  Left Lower Extremity Brace: [employer]  Position Activity Restriction  Other position/activity restrictions: up with assist  Subjective   General  Chart Reviewed: Yes  Patient assessed for rehabilitation services?: Yes  Response to previous treatment: Patient with no complaints from previous session  Family / Caregiver Present: Yes(Pt's , Jann Clause, present for family training in afternoon)  Referring Practitioner: Verlinda Aschoff, MD  Diagnosis: CVA with Left hemiparesis  Subjective  Subjective: Pt in bed on arrival first session, very pleasant and agreeable to treatment  General Comment  Comments: RN cleared pt for OT; pt resting in bed, agreeable to therapy  Vital Signs  Patient Currently in Pain: Denies   Orientation  Orientation  Overall Orientation Status: Within Functional Limits  Objective    ADL  Feeding: Modified independent ; Increased time to complete  Grooming: Setup  LE Dressing: Minimal assistance;Setup; Increased time to complete  Toileting: Minimal assistance        Balance  Sitting Balance: Supervision  Standing Balance: Minimal assistance  Standing Balance  Time: >2 minutes, 1-2 minutes, <1 minute over multiple trials  Activity: mobility, transfers, standing ADL, dynamic standing tasks  Functional Mobility  Functional - Mobility Device: Rolling Walker  Activity: Other  Assist Level: Minimal assistance  Functional Mobility Comments: Pt's  providing assistance for brief mobility with walker, PT or OT providing CGA also and cues to pt initially  Toilet Transfers  Toilet - Technique: Stand step; To right; To left  Equipment Used: Standard toilet  Toilet Transfer: Contact guard assistance  Toilet Transfers Comments: Pt's  providing assistance  Shower Transfers  Shower Transfers Comments: Simulated home walk-in shower setup with small, ~4 inch, lip and shower chair, with pt performing with cues, pivoting to R and L and up to Min A provided by pt's . Pt's  reports able to remove sliding doors from shower at home and provided pictures of setup.   Bed mobility  Supine to Sit: Supervision  Sit to Supine: Supervision  Transfers  Stand Step Transfers: Minimal assistance;Contact guard assistance  Stand Pivot Transfers: Contact guard assistance  Sit to stand: Contact guard assistance  Stand to sit: Contact guard assistance        Coordination  Fine Motor: Pt continues to be limited by LUE control      Cognition  Overall Cognitive Status: WFL  Memory: Decreased recall of precautions  Initiation: Requires cues for some        Type of ROM/Therapeutic Exercise  Type of ROM/Therapeutic Exercise: AROM;AAROM  Comment: BUE in semi-hayward's  Exercises  Shoulder Depression: x20 no resistance  Shoulder Elevation: x20 no resistance  Shoulder Flexion: x10 to <90* LUE  Elbow Flexion: x20  Elbow Extension: x20  Wrist Flexion: x20 no resistance  Wrist Extension: x20 no resistance  Finger Flexion: x10  Finger Extension: x10  Other: x10 ulnar/radial deviation at wrist        Plan   Plan  Times per week: 5-7 days/ wk  Times per day: Daily  Plan weeks: 2 weeks  Current Treatment Recommendations: Balance Training, Functional Mobility Training, Safety Education & Training, Positioning, Neuromuscular Re-education, Self-Care / ADL, Strengthening, Gait Training, Patient/Caregiver Education & Training, Stair training, ROM, Equipment Evaluation, Education, & procurement, Home Management Training, Endurance Training, Pain Management, Wheelchair Mobility Training, Cognitive/Perceptual Training, Cognitive Reorientation, Modalities (comment)    Goals  Short term goals  Time Frame for Short term goals: 1 week (3-26-21)  Short term goal 1: Pt will complete functional transfers mod A - GOAL MET 3/25 mod A  Short term goal 2: Pt will complete UBD using bisi-techniques mod A - GOAL MET 3/25 mod A  Short term goal 3: min assist with LUE self ROM/positioning - GOAL MET 3/29  Short term goal 4: Pt will complete toileting mod A - GOAL MET 3/29  Long term goals  Time Frame for Long term goals : 2 weeks (4-2-21)  Long term goal 1: Pt will complete functional transfers SBA  Long term goal 2: Pt will complete UBD using bisi-techniques SBA - GOAL MET, without bra, 4/1  Long term goal 3: Pt will complete toileting CGA  Patient Goals   Patient goals : \"get stronger so I can get back to my normal routines\"       Therapy Time   Individual Concurrent Group Co-treatment   Time In 1000     1250   Time Out 1030     1330   Minutes 30     40   Timed Code Treatment Minutes: 70 Minutes     Second Session Therapy Time:   Individual Concurrent Group Co-treatment   Time In 1330        Time Out 1400        Minutes 30          Timed Code Treatment Minutes:  30    Total Treatment Minutes:  [address] [facility], OTR/L

## 2021-04-06 NOTE — PATIENT CARE CONFERENCE
[facility]  Inpatient Rehabilitation  Weekly Team Conference Note    Date: 2021  Patient Name: Abril Petersen        MRN: [id number]    : 1950  (69 y.o.)  Gender: female   Referring Practitioner: Alem Karimi MD  Diagnosis: acute left sided weakness      Interventions to be utilized toward barriers to discharge, per discipline:  [address] observed barriers to dc: Decreased endurance, Upper extremity weakness and Lower extremity weakness  Nursing interventions:Assist with ADL's, toileting and medication management  Family Education: yes  Fall Risk:  Yes      Physical therapy observed barriers to dc:    Baseline: Lives with spouse, one level home, ramped entrance, I without AD              Current level: S to CGA bed mobility, CGA to Piotr t/f, Piotr x 1-2 gait household distances with RW, S w/c mobility              Barriers to DC: L bisi, decreased coordination/motor planning, decreased strength, decreased activity tolerance              Needs in order to achieve dc home/next level of care: Likely needs to [address] to [place] with functional mobility and gait vs w/c, has ramped entrance. Would recommend family training with pt's  prior to d/c (pt's  participating in family training this week)    Physical therapy interventions:   Current Treatment Recommendations: Strengthening, Balance Training, Transfer Training, Neuromuscular Re-education, Home Exercise Program, Endurance Training, Gait Training, Functional Mobility Training, Wheelchair Mobility Training      PHYSICAL THERAPY  PT Equipment Recommendations  Equipment Needed: Yes  Mobility Devices: Hedwig Blotter, Wheelchair  Walker: Rolling  Wheelchair: Standard  Other: Continue to assess pending pt's progress. Potentially requiring RW with clamshell and manual w/c. Assessment: Pt pleasant and agreeable to PT this morning, presented in recliner chair at beginning of session.  Pt able to complete several t/f from variable surfaces/heights with CGA-Piotr and 290' w/c propulsion with supervision. Pt is limited by functional acitivity tolerance and will benefit from continued skilled PT to challenge endurance and strength in the ARU. Pt's  Sulma Guzman participates in family training with focus on bed mobility, functional t/f and short household distances with gait. Sulma Guzman is attentive and demonstrates good carryover of education. Occupational therapy observed barriers to dc:    Baseline: IND with ADL/IADL, functional mobility/transfers   Current level: Min-Mod A brief mobility with RW, transfers to L, pivot transfers CGA majority of time; up to Mod A LE ADL    Barriers to DC: varied level of assist with fatigue, L side deficits   Needs in order to achieve dc home/next level of care: CGA-Min A x1 for BADL and functional transfers, available from her ; Pt's  to attend family training 4/6, 4/7, and 4/8; Pt will likely be w/c level for mobility at time of d/c     Occupational Therapy interventions:  Current Treatment Recommendations: Balance Training, Functional Mobility Training, Safety Education & Training, Positioning, Neuromuscular Re-education, Self-Care / ADL, Strengthening, Gait Training, Patient/Caregiver Education & Training, Stair training, ROM, Equipment Evaluation, Education, & procurement, Home Management Training, Endurance Training, Pain Management, Wheelchair Mobility Training, Cognitive/Perceptual Training, Cognitive Reorientation, Modalities (comment)      OCCUPATIONAL THERAPY    Assessment: Pt continues to be very pleasant and motivated to participate in therapy, with pt's  present in afternoon sessions to receive education on assisting pt with ADL, brief functional mobility and functional transfers at d/c. Pt and  asked appropriate questions and verbalized understanding of all education provided.  Pt's  to attend further family training next date, to include negotiating small step with PT, LE ADL, and HEPs. Continue OT tx. SPEECH THERAPY   Pt has been d/c from [facility]; all goals met. NUTRITION  Weight: 140 lb 3.4 oz (63.6 kg) / Body mass index is 27.38 kg/m². Diet Order: DIET GENERAL; Carb Control: 4 carb choices (60 gms)/meal  Dietary Nutrition Supplements: Other Oral Supplement (see comment)  PO Meals Eaten (%): 51 - 75%  Education: Completed      CASE MANAGEMENT  Assessment: home with Spouse on 4/10. DME thru [facility]. [facility]         Interdisciplinary Goals:   1.) Pt will complete LE dressing with Min A/setup  2.) Pt will complete functional t/f without AD with SBA  3.) Safe d/c home 4/10/2021. Discharge Plan   Estimated discharge date: 4/10/2021  Destination: home health  Pass:No  Services at [address], Occupational Therapy, Speech Therapy and [facility] at [facility], manual wheelchair (22\") with elevating leg rests and swing away arm rests, sacral cut out cushion, gait belt, tub transfer bench, grab bars, possible AFO hyperextension knee brace, bedside commode. Team Members Present at Conference:  : Zachary FOSTER    Occupational Therapist: Kary Cervantes, OTR/L  Physical Therapist:Layla Juarez, PT  Speech Therapist: N/A  Nurse: Tutu Wing RN  Dietician: Letcher Bloch, RDN, LD  : Luis Hill, OTR/L  Psychiatry: N/A    Family members present at conference: No      I led this team conference and I approve the established interdisciplinary plan of care as documented within the medical record of Tato Phillips. MD: Yony Del Castillo.  Sergey Isidro MD 4/7/2021, 11:21 AM

## 2021-04-06 NOTE — PROGRESS NOTES
Physical Therapy  [facility]  Daily Treatment Note  NAME: Kevin Mccain  : 1950  MRN: [id number]    Date of Service: 2021    Discharge Recommendations:  24 hour supervision or assist, Home with Home health PT   PT Equipment Recommendations  Equipment Needed: Yes  Walker: Rolling  Wheelchair: Standard  Other: Continue to assess pending pt's progress. Potentially requiring RW with clamshell and manual w/c. Assessment   Body structures, Functions, Activity limitations: Decreased functional mobility ; Decreased balance;Decreased strength;Decreased coordination;Decreased endurance  Assessment: Pt pleasant and agreeable to PT this morning, presented in recliner chair at beginning of session. Pt able to complete several t/f from variable surfaces/heights with CGA-Piotr and 290' w/c propulsion with supervision. Pt is limited by functional acitivity tolerance and will benefit from continued skilled PT to challenge endurance and strength in the ARU. Treatment Diagnosis: Impaired functional mobilty  Prognosis: Good  Decision Making: Medium Complexity  PT Education: Goals;PT Role;General Safety;Home Exercise Program;Transfer Training;Precautions  Patient Education: PT provided safety cuing for sequencing during t/f and reciprocal stepping during w/c propulsion. Pt verbalized understanding but will benefit from continued PT education. Barriers to Learning: none  REQUIRES PT FOLLOW UP: Yes  Activity Tolerance  Activity Tolerance: Patient limited by endurance; Patient Tolerated treatment well  Activity Tolerance: Pt tolerated tx well and verbalized/demonstrated good understanding of therapist cues with activities. Patient Diagnosis(es): There were no encounter diagnoses. has a past medical history of Diabetes mellitus ([address] GERD (gastroesophageal reflux disease), and Hypertension. has no past surgical history on file.     Restrictions  Restrictions/Precautions Management: Yes  Left Brakes Level of Assistance: Supervision(Pt required therapist cuing to lock w/c brakes)  Right Brakes Level of Assistance: Supervision(Pt required therapist cuing to lock w/c brakes)  Propulsion: Yes  Propulsion 1  Propulsion: Manual  Level: Level Tile  Method: LLE;RLE;RUE  Level of Assistance: Supervision  Description/ Details: Pt required faded feedback regarding LUE management and cuing to step reciprocally with BLE. Pt voiced that her LLE was \"tired\" early in session in response to noted dragging, but stepping improved later in session with cuing. Distance: 100' + 36' + 150'                                Addendum: 2nd session  Pt sitting EOB on arrival with pt's  Antoinette Ciaran present for family training. Pt denies c/o pain. Brain participates in family training with focus on bed mobility, t/f, car t/f and short bout of gait with RW. Brain attentive and demonstrates good understanding and carryover of education. Second half of session completed as co-tx with OT with focus on continued family training with gait activities and trial of shower t/f with simulated set up of home. Antoinette Ciaran instructed on donning/doffing AFO prior to mobility.      Bed mobility:  Supine to sit with HOB elevated and use of BR with Antoinette Ciaran providing supervision  Sit to supine with Brain providing CGA for BLE  Intermittent cues for sequence  Transfers:  SPT EOB to w/c CGA (towards R)  SPT w/c to EOB CGA/Piotr (towards L)  SPT w/c to/from EOB with Antoinette Ciaran providing CGA/Piotr, intermittent cues from PT for safety/sequence completed multiple reps  Car t/f x 2 reps with Antoinette Ciaran providing Piotr, intermittent cues for sequence/safety  Sit to/from stand EOB to RW with Antoinette Ciaran providing Piotr  Sit to/from stand commode to RW with grab bar, Antoinette Ciaran providing Piotr  SPT w/c to shower chair without AD with Antoinette Ciaran providing Piotr  Pt requires intermittent cues for hand and foot placement to improve positioning  Gait  Ambulation 1  Surface: level tile  Device: Rolling Walker  Other Apparatus: LLE AFO  Assistance: Minimal assistance;2 Person assistance  Quality of Gait: Step to gait pattern, leading with LLE, ataxic gait with decreased coordination and motor planning LLE. Decreased stance time on the left. BLE genu recurvatuum. Pt mildly unsteady no overt LOB. Gait Deviations: Slow Lisa;Decreased step length;Decreased step height;Shuffles;Staggers;Decreased head and trunk rotation  Distance: 15' x 2  Morales providing Piotr with gait belt with pt lateral providing additional CGA and intermittent cues for safety. W/c mobility:  Wheelchair Type: Standard  Wheelchair Cushion: Standard  Wheelchair Parts Management: Yes  Left Brakes Level of Assistance: Supervision(Pt required therapist cuing to lock w/c brakes)  Right Brakes Level of Assistance: Supervision(Pt required therapist cuing to lock w/c brakes)  Propulsion: Yes  Propulsion 1  Propulsion: Manual  Level: Level Tile  Method: LLE;RLE;RUE  Level of Assistance: Supervision  Description/ Details: Pt required cuing for LUE management. Pt demonstrated good reciprocal stepping pattern w/ BLE. Distance: 200'    Pt seated in w/c with OT at end of session for continued family training. Goals  Short term goals  Time Frame for Short term goals: 1 week, 3/26/21  Short term goal 1: Pt will complete bed mobility with SBA and no rails. -- GOAL PARTIALLY MET 4/5: Supine to sit SBA with raised HOB and rails. Short term goal 2: Pt will complete sit<>stand with Min A with LRAD. -- GOAL MET Piotr to CGA with RW  Short term goal 3: Pt will ambulate 100 feet with LRAD and Mod Ax1. -- GOAL NOT MET but progressing Piotr x 2 up to 48' with RW  Short term goal 4: Pt will complete bed<>chair transfer with LRAD and Min A. -- GOAL MET Piotr to CGA with RW  Short term goal 5: Pt will complete 150 feet of w/c mobility with multiple turns and IND.  4/2: 150 feet with SBA  Long term goals  Time Frame for Long term goals : 2 weeks, by 4/2/21; updated to 4/10 per planned d/c date  Long term goal 1: Pt will complete bed mobility with independence in flat bed without rails. Long term goal 2: Pt will complete sit<>stands with independence and LRAD. Long term goal 3: Pt will complete bed<>Chair transfer with LRAD and independence. Long term goal 4: Pt will ambulate 100 feet with LRAD and independence. Long term goal 5: Pt will ascend/descend 4 steps with bilateral rails and CGA. Patient Goals   Patient goals : To get stronger and go home.     Plan    Plan  Times per week: 5-7  Times per day: Daily  Plan weeks: 2 weeks, by 4/2/21  Current Treatment Recommendations: Strengthening, Balance Training, Transfer Training, Neuromuscular Re-education, Home Exercise Program, Endurance Training, Gait Training, Functional Mobility Training, Wheelchair Mobility Training  Plan Comment: progress functional mobility as tolerated  Safety Devices  Type of devices: Call light within reach, Gait belt, Nurse notified, All fall risk precautions in place, Bed alarm in place, Left in bed     Therapy Time   Individual Concurrent Group Co-treatment   Time In 0930         Time Out 1000         Minutes 30         Timed Code Treatment Minutes: 30 Minutes     Second Session Therapy Time:   Individual Concurrent Group Co-treatment   Time In 1230     1250   Time Out 1250     1330   Minutes 20     40     Timed Code Treatment Minutes:  60 minutes    Total Treatment Minutes:  90 minutes    Gracia Pack,SPT       Morgan Munson PT, DPT

## 2021-04-07 LAB
ANION GAP SERPL CALCULATED.3IONS-SCNC: 10 MMOL/L (ref 3–16)
BUN BLDV-MCNC: 23 MG/DL (ref 7–20)
CALCIUM SERPL-MCNC: 11 MG/DL (ref 8.3–10.6)
CHLORIDE BLD-SCNC: 102 MMOL/L (ref 99–110)
CO2: 29 MMOL/L (ref 21–32)
CREAT SERPL-MCNC: 1 MG/DL (ref 0.6–1.2)
GFR AFRICAN AMERICAN: >60
GFR NON-AFRICAN AMERICAN: 55
GLUCOSE BLD-MCNC: 105 MG/DL (ref 70–99)
GLUCOSE BLD-MCNC: 110 MG/DL (ref 70–99)
GLUCOSE BLD-MCNC: 147 MG/DL (ref 70–99)
GLUCOSE BLD-MCNC: 157 MG/DL (ref 70–99)
GLUCOSE BLD-MCNC: 218 MG/DL (ref 70–99)
HCT VFR BLD CALC: 43.4 % (ref 36–48)
HEMOGLOBIN: 14.6 G/DL (ref 12–16)
MCH RBC QN AUTO: 28 PG (ref 26–34)
MCHC RBC AUTO-ENTMCNC: 33.6 G/DL (ref 31–36)
MCV RBC AUTO: 83.3 FL (ref 80–100)
PDW BLD-RTO: 14.4 % (ref 12.4–15.4)
PERFORMED ON: ABNORMAL
PLATELET # BLD: 130 K/UL (ref 135–450)
PMV BLD AUTO: 7.8 FL (ref 5–10.5)
POTASSIUM SERPL-SCNC: 4.1 MMOL/L (ref 3.5–5.1)
RBC # BLD: 5.21 M/UL (ref 4–5.2)
SODIUM BLD-SCNC: 141 MMOL/L (ref 136–145)
WBC # BLD: 4.5 K/UL (ref 4–11)

## 2021-04-07 PROCEDURE — 6360000002 HC RX W HCPCS: Performed by: PHYSICAL MEDICINE & REHABILITATION

## 2021-04-07 PROCEDURE — 97112 NEUROMUSCULAR REEDUCATION: CPT

## 2021-04-07 PROCEDURE — 85027 COMPLETE CBC AUTOMATED: CPT

## 2021-04-07 PROCEDURE — 6370000000 HC RX 637 (ALT 250 FOR IP): Performed by: PHYSICAL MEDICINE & REHABILITATION

## 2021-04-07 PROCEDURE — 97530 THERAPEUTIC ACTIVITIES: CPT

## 2021-04-07 PROCEDURE — 36415 COLL VENOUS BLD VENIPUNCTURE: CPT

## 2021-04-07 PROCEDURE — 1280000000 HC REHAB R&B

## 2021-04-07 PROCEDURE — 97116 GAIT TRAINING THERAPY: CPT

## 2021-04-07 PROCEDURE — 97535 SELF CARE MNGMENT TRAINING: CPT

## 2021-04-07 PROCEDURE — 80048 BASIC METABOLIC PNL TOTAL CA: CPT

## 2021-04-07 RX ADMIN — ENOXAPARIN SODIUM 40 MG: 40 INJECTION SUBCUTANEOUS at 08:33

## 2021-04-07 RX ADMIN — INSULIN LISPRO 2 UNITS: 100 INJECTION, SOLUTION INTRAVENOUS; SUBCUTANEOUS at 11:59

## 2021-04-07 RX ADMIN — BUSPIRONE HYDROCHLORIDE 5 MG: 5 TABLET ORAL at 21:35

## 2021-04-07 RX ADMIN — PROPRANOLOL HYDROCHLORIDE 80 MG: 40 TABLET ORAL at 08:33

## 2021-04-07 RX ADMIN — ATORVASTATIN CALCIUM 80 MG: 80 TABLET, FILM COATED ORAL at 21:36

## 2021-04-07 RX ADMIN — ACETAMINOPHEN 650 MG: 325 TABLET ORAL at 21:35

## 2021-04-07 RX ADMIN — Medication 5 MG: at 21:35

## 2021-04-07 RX ADMIN — AMLODIPINE BESYLATE 10 MG: 5 TABLET ORAL at 08:33

## 2021-04-07 RX ADMIN — ALLOPURINOL 300 MG: 300 TABLET ORAL at 08:34

## 2021-04-07 RX ADMIN — CETIRIZINE HYDROCHLORIDE 10 MG: 10 TABLET, FILM COATED ORAL at 08:34

## 2021-04-07 RX ADMIN — INSULIN LISPRO 2 UNITS: 100 INJECTION, SOLUTION INTRAVENOUS; SUBCUTANEOUS at 17:12

## 2021-04-07 RX ADMIN — ASPIRIN 81 MG: 81 TABLET, CHEWABLE ORAL at 08:33

## 2021-04-07 RX ADMIN — BUSPIRONE HYDROCHLORIDE 5 MG: 5 TABLET ORAL at 08:33

## 2021-04-07 RX ADMIN — ACETAMINOPHEN 650 MG: 325 TABLET ORAL at 12:05

## 2021-04-07 RX ADMIN — INSULIN GLARGINE 20 UNITS: 100 INJECTION, SOLUTION SUBCUTANEOUS at 21:36

## 2021-04-07 RX ADMIN — BUSPIRONE HYDROCHLORIDE 5 MG: 5 TABLET ORAL at 13:50

## 2021-04-07 ASSESSMENT — PAIN SCALES - GENERAL: PAINLEVEL_OUTOF10: 0

## 2021-04-07 NOTE — PROGRESS NOTES
Occupational Therapy  Facility/Department: [facility] ARU  Daily Treatment Note  NAME: Francesco Stern  : 1950  MRN: [id number]    Date of Service: 2021    Discharge Recommendations:  24 hour supervision or assist, Home with Home health OT  OT Equipment Recommendations  Equipment Needed: Yes  Mobility Devices: ADL Assistive Devices  ADL Assistive Devices: Toileting - Standard Commode; Shower Chair with back  Other: Pt would benefit from use of a commode as she will be room-confined at night and continues to require more assistance with functional mobility vs. pivot transfers. Pt also has urinary urgency. Pt would also benefit from use of a shower chair d/t impaired standing tolerance/balance. Use of a shower chair would increase safety/IND with bathing/dressing tasks, and also allow her to engage LUE in these tasks more. Assessment   Performance deficits / Impairments: Decreased functional mobility ; Decreased endurance;Decreased coordination;Decreased ADL status; Decreased posture;Decreased ROM; Decreased balance;Decreased strength;Decreased safe awareness;Decreased high-level IADLs;Decreased cognition;Decreased vision/visual deficit; Decreased fine motor control  Assessment: Pt continues to be very pleasant and motivated to participate in therapy, with pt's  present in afternoon sessions to receive additional education on assisting pt with ADL/IADL, HEP, and functional transfers. Pt and  expressing no OT concerns for d/c at this time, eager for pt to d/c home. Continue OT tx.  OT Education: OT Role;Plan of Care;Transfer Training;ADL Adaptive Strategies; Equipment; Energy Conservation;Precautions;Orientation;IADL Safety;Home Exercise Program  Patient Education: Importance of mobility and engaging LUE, LUE positioning, body awareness  REQUIRES OT FOLLOW UP: Yes  Activity Tolerance  Activity Tolerance: Patient Tolerated treatment well  Activity Tolerance: /77, HR 70, Spo2 99% on RA as taken by nursing  Safety Devices  Safety Devices in place: Yes  Type of devices: Call light within reach;Gait belt;Nurse notified; Left in bed;Bed alarm in place         Patient Diagnosis(es): There were no encounter diagnoses. has a past medical history of Diabetes mellitus ([address] GERD (gastroesophageal reflux disease), and Hypertension. has no past surgical history on file. Restrictions  Restrictions/Precautions  Restrictions/Precautions: General Precautions, Fall Risk  Required Braces or Orthoses?: Yes  Required Braces or Orthoses  Left Lower Extremity Brace: [employer]  Position Activity Restriction  Other position/activity restrictions: up with assist  Subjective   General  Chart Reviewed: Yes  Patient assessed for rehabilitation services?: Yes  Response to previous treatment: Patient with no complaints from previous session  Family / Caregiver Present: Yes(Pt's  present in afternoon for further family training)  Referring Practitioner: Cathy Ramirez MD  Diagnosis: CVA with Left hemiparesis  Subjective  Subjective: Pt in bed on arrival first session, very pleasant and agreeable to treatment  General Comment  Comments: RN cleared pt for OT; pt resting in bed, agreeable to therapy  Vital Signs  Patient Currently in Pain: Denies   Orientation  Orientation  Overall Orientation Status: Within Functional Limits  Objective    ADL  Feeding: Modified independent ; Increased time to complete  Grooming: Setup  LE Dressing: Minimal assistance;Setup; Increased time to complete(pt continues to require assist for shoes/AFO/RICK hose)  Additional Comments: Pt's  verbalized understanding of use of RICK hose, and reports assisted pt with donning earlier this week without difficulty  Instrumental ADL's  Instrumental ADLs: Yes(Pt reaching into upper shelf in therapy kitchen in stance with countertop support with CGA this date, cues for hand placement.  Pt retrieved items from drawers, lower cabinets, and refrigerator level w/c level with Supervision)     Balance  Sitting Balance: Supervision  Standing Balance: Minimal assistance  Standing Balance  Time: >2 minutes, 1-2 minutes, <1 minute over multiple trials  Activity: mobility, transfers, standing ADL, dynamic standing tasks  Functional Mobility  Functional - Mobility Device: Rolling Walker  Activity: Other  Assist Level: Minimal assistance  Bed mobility  Supine to Sit: Supervision  Sit to Supine: Supervision  Transfers  Sit to stand: Contact guard assistance;Minimal assistance  Stand to sit: Contact guard assistance  Transfer Comments: Pt's  providing assistance for functional transfers in second session. One trial sit <> stand required two attempts in afternoon d/t foot placement/increasing fatigue - pt's  identified and cued pt without assist        Coordination  Fine Motor: Pt continues to be limited by LUE control, but motivated to engage in bimanual tasks. Pt with difficulty manipulating coins, able to collect multiple coins with modified technique, unable to perform finger-palm/palm-finger translation        Cognition  Overall Cognitive Status: WFL  Memory: Decreased recall of precautions     Perception  Unilateral Attention: Cues to attend to left side of body        Exercises  Other:  In stance with countertop support, CGA: x10 weightshifts, x5 diagonal reach each UE, x10 mini squats; x5 each side forward reach in stance on foam balance mat with CGA + reaching across midline each UE for cones, fair coordination LUE for task            Plan   Plan  Times per week: 5-7 days/ wk  Times per day: Daily  Plan weeks: 2 weeks  Current Treatment Recommendations: Balance Training, Functional Mobility Training, Safety Education & Training, Positioning, Neuromuscular Re-education, Self-Care / ADL, Strengthening, Gait Training, Patient/Caregiver Education & Training, Stair training, ROM, Equipment Evaluation, Education, & procurement, Home Management Training, Endurance Training, Pain Management, Wheelchair Mobility Training, Cognitive/Perceptual Training, Cognitive Reorientation, Modalities (comment)    Goals  Short term goals  Time Frame for Short term goals: 1 week (3-26-21)  Short term goal 1: Pt will complete functional transfers mod A - GOAL MET 3/25 mod A  Short term goal 2: Pt will complete UBD using bisi-techniques mod A - GOAL MET 3/25 mod A  Short term goal 3: min assist with LUE self ROM/positioning - GOAL MET 3/29  Short term goal 4: Pt will complete toileting mod A - GOAL MET 3/29  Long term goals  Time Frame for Long term goals : 2 weeks (4-2-21)  Long term goal 1: Pt will complete functional transfers SBA - progressing  Long term goal 2: Pt will complete UBD using bisi-techniques SBA - GOAL MET, without bra, 4/1  Long term goal 3: Pt will complete toileting CGA  Patient Goals   Patient goals : \"get stronger so I can get back to my normal routines\"       Therapy Time   Individual Concurrent Group Co-treatment   Time In 0930         Time Out 1000         Minutes 30         Timed Code Treatment Minutes: 30 Minutes     Second Session Therapy Time:   Individual Concurrent Group Co-treatment   Time In 1300     1230   Time Out 1330     1300   Minutes 30     30     Timed Code Treatment Minutes:  60    Total Treatment Minutes:  238 ROSEANNA Guillen/L

## 2021-04-07 NOTE — DISCHARGE INSTR - COC
Continuity of Care Form    Patient Name: Mila Pal   :  1950  MRN:  [id number]    Admit date:  3/19/2021  Discharge date:  4/10/2021    Code Status Order: Full Code   Advance Directives:   [facility] Documentation       Date/Time Healthcare Directive Type of Healthcare Directive Copy in [facility] [address] Agent's Name Healthcare Agent's Phone Number    21 1612  No, patient does not have an advance directive for healthcare treatment -- -- -- -- --            Admitting Physician:  Savannah Up MD  PCP: Debi Whelan MD    Discharging Nurse: Bard Priscila RN  [facility]104/1824-38  Discharging Unit Phone Number: [phone]    Emergency Contact:   Extended Emergency Contact Information  Primary Emergency Contact: [facility]  Address: [address] [place] [address], [address] [place] [address] Phone: [phone]  Mobile Phone: [phone]  Relation: Spouse    Past Surgical History:  No past surgical history on file. Immunization History: There is no immunization history on file for this patient.     Active Problems:  Patient Active Problem List   Diagnosis Code    TIA (transient ischemic attack) G45.9    DM (diabetes mellitus), type 2, uncontrolled with complications ([address] P85.3, E11.65    Essential hypertension with goal blood pressure less than 140/90 I10    Hypertriglyceridemia E78.1    HTN (hypertension), benign I10    Dyslipidemia E78.5    Acute left-sided weakness R53.1    Cerebrovascular accident (CVA) ([address] I63.9    DM (diabetes mellitus), secondary, uncontrolled, w/neurologic complic (HCC) J00.45, J75.10    Acute ischemic stroke ([facility] I63.9       Isolation/Infection:   Isolation            No Isolation          Patient Infection Status       None to display            Nurse Assessment:  Last Vital Signs: BP (!) 146/77   Pulse 70   Temp 97.9 °F (36.6 °C) (Oral)   Resp 16   Ht 5' (1.524 m)   Wt 140 lb 3.4 oz (63.6 kg)   SpO2 99%   BMI 27.38 kg/m²     Last documented pain score (0-10 scale): Pain Level: 8  Last Weight:   Wt Readings from Last 1 Encounters:   04/04/21 140 lb 3.4 oz (63.6 kg)     Mental Status:  oriented, alert, coherent, logical, thought processes intact and able to concentrate and follow conversation    IV Access:  - None    Nursing Mobility/ADLs:  Walking   Assisted  Transfer  Assisted  Bathing  Assisted  Dressing  Independent  Toileting  Independent  Feeding  Independent  Med [address] [place] [address] Delivery   whole    Wound Care Documentation and Therapy:        Elimination:  Continence:   · Bowel: Yes  · Bladder: Yes  Urinary Catheter: None   Colostomy/Ileostomy/Ileal Conduit: No       Date of Last BM: 4-9-21    Intake/Output Summary (Last 24 hours) at 4/7/2021 1320  Last data filed at 4/7/2021 1140  Gross per 24 hour   Intake 320 ml   Output    Net 320 ml     I/O last 3 completed shifts: In: 480 [P.O.:480]  Out: -     Safety Concerns: At Risk for Falls    Impairments/Disabilities:      None    Nutrition Therapy:  Current Nutrition Therapy:   - Oral Diet:  Carb Control 4 carbs/meal (1800kcals/day)    Routes of Feeding: Oral  Liquids: Thin Liquids  Daily Fluid Restriction: no  Last Modified Barium Swallow with Video (Video Swallowing Test): not done    Treatments at the Time of Hospital Discharge:   Respiratory Treatments: ***  Oxygen Therapy:  is not on home oxygen therapy.   Ventilator:    - No ventilator support    Rehab Therapies: Physical Therapy, Occupational Therapy, Speech Therapy,  and Nurse  Weight Bearing Status/Restrictions: No weight bearing restirctions  Other Medical Equipment (for information only, NOT a DME order):  {EQUIPMENT:[id number]}  Other Treatments: ***    Patient's personal belongings (please select all that are sent with patient):  Glasses    RN SIGNATURE:  Electronically signed by Fredrick Husain RN on 4/10/21 at 10:55 AM EDT    CASE MANAGEMENT/SOCIAL WORK SECTION    Inpatient Status Date: 4/9/2021    Readmission Risk Assessment Score:  Readmission Risk              Risk of Unplanned Readmission:        17           Discharging to Facility/ Agency   · Name: [facility]  · Address:  · Phone:[phone]  · Fax:      / signature: Electronically signed by CRISTIAN Larios on 4/8/21 at 1:08 PM EDT    PHYSICIAN SECTION    Prognosis: Good    Condition at Discharge: Stable    Rehab Potential (if transferring to Rehab): Good    Recommended Labs or Other Treatments After Discharge: Home health as ordered  Follow up with PCP, follow up with Dr. Paulette Huffman    Physician Certification: I certify the above information and transfer of Chris Hart  is necessary for the continuing treatment of the diagnosis listed and that she requires Home Care for less 30 days.      Update Admission H&P: No change in H&P    PHYSICIAN SIGNATURE:  Electronically signed by Kasey Millard MD on 4/9/21 at 8:00 AM EDT

## 2021-04-07 NOTE — PROGRESS NOTES
Donovan Beck  4/7/2021  [id number]    Chief Complaint: Acute ischemic stroke ([facility] [address]    Subjective:   No issues overnight; resting in bed; sleeping well. ROS: no n/v, cp, sob, f/c  Objective:  Patient Vitals for the past 24 hrs:   BP Temp Temp src Pulse Resp SpO2   04/07/21 0745 (!) 146/77 97.9 °F (36.6 °C) Oral 70 16 99 %   04/06/21 1930 137/67 98.1 °F (36.7 °C) Oral 65 16 96 %   04/06/21 1006 112/68          Gen: No distress, pleasant. HEENT: Normocephalic, atraumatic. CV: Regular rate and rhythm. Resp: No respiratory distress. Abd: Soft, nontender   Ext: No edema. Neuro: Alert, oriented, appropriately interactive. Wt Readings from Last 3 Encounters:   04/04/21 140 lb 3.4 oz (63.6 kg)   03/16/21 162 lb (73.5 kg)   07/14/20 158 lb 14.4 oz (72.1 kg)       Laboratory data:   Lab Results   Component Value Date    WBC 4.6 04/05/2021    HGB 14.0 04/05/2021    HCT 41.0 04/05/2021    MCV 83.1 04/05/2021     (L) 04/05/2021       Lab Results   Component Value Date     04/05/2021    K 4.0 04/05/2021    K 3.4 07/14/2020     04/05/2021    CO2 30 04/05/2021    BUN 27 04/05/2021    CREATININE 1.1 04/05/2021    GLUCOSE 106 04/05/2021    CALCIUM 11.1 04/05/2021        Therapy progress:  PT  Required Braces or Orthoses  Left Lower Extremity Brace: Sharita Foot Orthotics  Position Activity Restriction  Other position/activity restrictions: up with assist  Objective     Sit to Stand: Contact guard assistance, Minimal Assistance  Stand to sit: Contact guard assistance, Minimal Assistance  Bed to Chair: Contact guard assistance, Minimal assistance  Device: Rolling Walker  Other Apparatus: Wheelchair follow  Assistance: Minimal assistance, 2 Person assistance, Moderate assistance  Distance: 50 feet + 35 feet  OT  PT Equipment Recommendations  Equipment Needed: Yes  Mobility Devices: Charley Luna, Wheelchair  Walker: Rolling  Wheelchair: Standard  Other: Continue to assess pending pt's progress. Potentially requiring RW with clamshell and manual w/c. Toilet - Technique: Stand step, To right, To left  Equipment Used: Standard toilet  Toilet Transfers Comments: Pt's  providing assistance  Assessment        SLP  Current Diet : Regular  Current Liquid Diet : Thin  Diet Solids Recommendation: Regular  Liquid Consistency Recommendation: Thin    Body mass index is 27.38 kg/m². Assessment and Plan:  Acute ischemic infarct: ASA, statin.  PT/OT/SLP. Transient aphasia could be mild recrudescence vs TIA; follow.     Diabetes: control improving, Lantus 20, SSI;      HTN: propranolol 80; increased norvasc to 10     HLD: significantly elevated triglycerides, Lipitor 80     LEX: Push PO. Patient prefers no IVF     Bowel: Per protocol  Bladder: Per protocol  Sleep: Trazodone PRN  Pain: tylenol, tramadol  DVT PPx: lovenox  CECILY: 4/10 home w/ home health  DME: Possible AFO and hyperextension knee brace, RW, manual wc with ELR and swing away arm rests, sacral cutout cushion, gait belt, shower chair, bsc, grab bars    Interdisciplinary team conference was held today with entire rehab treatment team including PT, OT, SLP, Dietician, RN, and SW. Discussion focused on progress toward rehab goals and discharge planning. Barriers: weakness, balance, endurance.  Total treatment time >35 min with greater than 50% spent in care coordination.      Kasey Millard MD  04/07/21  8:04 AM

## 2021-04-07 NOTE — CARE COORDINATION
Writer met with patient to discuss disposition of care conference with interdisciplinary team on   4/7/2021            Discussed:  PT progress with therapy and DME needs thru [facility]     Recommendations: home with [facility]    Anticipated discharge date: 4/10/2021    Patient verbalized understanding of recommendations and anticipated discharge date.    CRISTIAN Stahl

## 2021-04-07 NOTE — PROGRESS NOTES
Physical Therapy  [facility]  Daily Treatment Note  NAME: Tiago Candelaria  : 1950  MRN: [id number]    Date of Service: 2021    Discharge Recommendations:  24 hour supervision or assist, Home with Home health PT   PT Equipment Recommendations  Equipment Needed: Yes  Mobility Devices: Sammie Susan; Wheelchair  Walker: Rolling  Wheelchair: Standard  Other: With swing away leg rests, and elevating arm rests. Assessment   Body structures, Functions, Activity limitations: Decreased functional mobility ; Decreased balance;Decreased strength;Decreased coordination;Decreased endurance  Assessment: Pt seen for first session focusing on w/c mobility with BLE/BUE, navigating w/c on uneven outdoor surfaces, ambulation with RW and Min A x1 on outdoor surfaces x2, sit<>stand with CGA<>Min A, and SPT with CGA. Second session was a co-tx with OT- focusing on ambulation with increased distance, curb step with RW, and standing dynamic balance challenges. Third session was family education with spouse focusing on ambulation, proper guarding with transfers, and education on goals and expectations. Pt and spouse verbalized understanding for all without any additional questions. Pt will benefit from continued skilled PT to address deficits above. Pt continues to make good progres with therapy. Continue ARU PT POC. Treatment Diagnosis: Impaired functional mobilty  Prognosis: Good  PT Education: Goals;PT Role;General Safety;Home Exercise Program;Transfer Training;Precautions; Functional Mobility Training;Gait Training;Energy Conservation;Plan of Care  Patient Education: Pt given extensive education on proper positioning and hand placement, pt and spouse verbalized understanding for all  Barriers to Learning: none  REQUIRES PT FOLLOW UP: Yes  Activity Tolerance  Activity Tolerance: Patient limited by endurance; Patient Tolerated treatment well  Activity Tolerance: Pt tolerated tx well and verbalized/demonstrated good understanding of therapist cues with activities. Patient Diagnosis(es): There were no encounter diagnoses. has a past medical history of Diabetes mellitus ([address] GERD (gastroesophageal reflux disease), and Hypertension. has no past surgical history on file. Restrictions  Restrictions/Precautions  Restrictions/Precautions: General Precautions, Fall Risk  Required Braces or Orthoses?: Yes  Required Braces or Orthoses  Left Lower Extremity Brace: Sharita Foot Orthotics  Position Activity Restriction  Other position/activity restrictions: up with assist  Subjective   General  Chart Reviewed: Yes  Response To Previous Treatment: Patient with no complaints from previous session. Family / Caregiver Present: Yes( present during final session)  Referring Practitioner: Yas Shen MD  Subjective  Subjective: Pt pleasant and agreeable to therapy. Pt participating well throughout the session. General Comment  Comments: RN cleared pt for therapy. Pain Screening  Patient Currently in Pain: Denies  Vital Signs  Patient Currently in Pain: Denies       Orientation  Orientation  Overall Orientation Status: Within Normal Limits  Cognition   Cognition  Overall Cognitive Status: WFL  Objective   Bed mobility  Supine to Sit: Supervision  Sit to Supine: Supervision  Comment: HOB elevated, supervision for positioning. Transfers  Sit to Stand: Contact guard assistance;Minimal Assistance  Stand to sit: Contact guard assistance;Minimal Assistance  Bed to Chair: Contact guard assistance;Minimal assistance  Stand Pivot Transfers: Contact guard assistance;Minimal Assistance  Comment: CGA<>Min A for sit<>stands and transfers from various surfaces. Pt tolerating well throughout the session. Pt requires intermittent cues for hand placement and positioning.   Ambulation  Ambulation?: Yes  More Ambulation?: Yes  Ambulation 1  Surface: level tile  Device: Rolling Walker  Assistance: Minimal assistance  Quality of Gait: Step to gait pattern, leading with LLE, ataxic gait with decreased coordination and motor planning LLE. Decreased stance time on the left. Pt demos improved stepping and neutral hip compared to external rotation like last week. Pt demos some hyperextension with fatigue, but also improved. Pt demos improved awareness of fatigue signs and symptoms. Gait Deviations: Slow Lisa;Decreased step length;Decreased step height;Shuffles;Staggers;Decreased head and trunk rotation  Distance: 100 feet + 50 feet + 10 feet + 10 feet  Ambulation 2  Surface - 2: uneven;outdoors  Device 2: Rolling Walker  Assistance 2: Minimal assistance  Quality of Gait 2: Step to gait pattern, decreased stance time on the left. Pt demos improved stepping and neutral hip compared to external rotation like last week. Pt demos some hyperextension with fatigue, but also improved. Pt demos improved awareness of fatigue signs and symptoms. Gait Deviations: Slow Ilsa;Decreased step length;Shuffles;Decreased step height  Distance: 80 feet + 40 feet x2 + 20 feet x3  Stairs/Curb  Stairs?: Yes  Stairs  # Steps : 1  Device: Rolling walker  Assistance: Minimal assistance;2 Person assistance  Comment: 2\" curb step with RW and Min A x2 during co-tx. Cues for RW positionin and sequencing d/t increased fatigue and weakness. Wheelchair Activities  Wheelchair Parts Management: Yes  Left Armrest Level of Assistance: Independent  Right Armrest Level of Assistance: Independent  Left Leg Rest Level of Assistance: Independent  Right Leg Rest Level of Assistance: Independent  Left Brakes Level of Assistance: Independent  Right Brakes Level of Assistance: Independent  Propulsion: Yes  Propulsion 1  Propulsion: Manual  Level: Level Tile  Method: LLE;RLE;RUE  Level of Assistance: Supervision  Description/ Details: Pt required faded feedback regarding LUE management and cuing to step reciprocally with BLE. Extended time required.   Distance: 100 feet + 150 feet + 75 feet Balance  Posture: Fair  Sitting - Static: Good  Sitting - Dynamic: Fair;+  Standing - Static: Fair  Standing - Dynamic: Fair;-  Comments: Dynamic standing and reaching cones cross body with OT faciliating BUE movement and PT facilitating weight shift and proper positioning. Pt tolerated standing for ~5 minutes prior to a seated rest.         Goals  Short term goals  Time Frame for Short term goals: 1 week, 3/26/21  Short term goal 1: Pt will complete bed mobility with SBA and no rails. -- GOAL PARTIALLY MET 4/5: Supine to sit SBA with raised HOB and rails. Short term goal 2: Pt will complete sit<>stand with Min A with LRAD. -- GOAL MET Piotr to CGA with RW  Short term goal 3: Pt will ambulate 100 feet with LRAD and Mod Ax1. -- GOAL MET Piotr x 1 up to 100 ft with RW  Short term goal 4: Pt will complete bed<>chair transfer with LRAD and Min A. -- GOAL MET Piotr to CGA with RW  Short term goal 5: Pt will complete 150 feet of w/c mobility with multiple turns and IND. 4/7: 150 feet with supervision  Long term goals  Time Frame for Long term goals : 2 weeks, by 4/2/21; updated to 4/10 per planned d/c date  Long term goal 1: Pt will complete bed mobility with independence in flat bed without rails. Long term goal 2: Pt will complete sit<>stands with independence and LRAD. Long term goal 3: Pt will complete bed<>Chair transfer with LRAD and independence. Long term goal 4: Pt will ambulate 100 feet with LRAD and independence. Long term goal 5: Pt will ascend/descend 4 steps with bilateral rails and CGA. Patient Goals   Patient goals : To get stronger and go home.     Plan    Plan  Times per week: 5-7  Times per day: Daily  Plan weeks: 2 weeks, by 4/9/21  Current Treatment Recommendations: Strengthening, Balance Training, Transfer Training, Neuromuscular Re-education, Home Exercise Program, Endurance Training, Gait Training, Functional Mobility Training, Wheelchair Mobility Training  Plan Comment: progress functional mobility as tolerated  Safety Devices  Type of devices: Call light within reach, Gait belt, Nurse notified, All fall risk precautions in place, Bed alarm in place, Left in bed     Therapy Time   Individual Concurrent Group Co-treatment   Time In 0900     1230   Time Out 0930     1300   Minutes 30     30   Timed Code Treatment Minutes: [address] Time:   Individual Concurrent Group Co-treatment   Time In 1430        Time Out 1500        Minutes 30        Timed Code Treatment Minutes:  30 Minutes    Total Treatment Minutes:  [address], PT

## 2021-04-08 LAB
GLUCOSE BLD-MCNC: 100 MG/DL (ref 70–99)
GLUCOSE BLD-MCNC: 106 MG/DL (ref 70–99)
GLUCOSE BLD-MCNC: 107 MG/DL (ref 70–99)
GLUCOSE BLD-MCNC: 151 MG/DL (ref 70–99)
PERFORMED ON: ABNORMAL

## 2021-04-08 PROCEDURE — 97110 THERAPEUTIC EXERCISES: CPT

## 2021-04-08 PROCEDURE — 6360000002 HC RX W HCPCS: Performed by: PHYSICAL MEDICINE & REHABILITATION

## 2021-04-08 PROCEDURE — 1280000000 HC REHAB R&B

## 2021-04-08 PROCEDURE — 97535 SELF CARE MNGMENT TRAINING: CPT

## 2021-04-08 PROCEDURE — 6370000000 HC RX 637 (ALT 250 FOR IP): Performed by: PHYSICAL MEDICINE & REHABILITATION

## 2021-04-08 PROCEDURE — 97116 GAIT TRAINING THERAPY: CPT

## 2021-04-08 PROCEDURE — 97112 NEUROMUSCULAR REEDUCATION: CPT

## 2021-04-08 PROCEDURE — 97530 THERAPEUTIC ACTIVITIES: CPT

## 2021-04-08 RX ADMIN — AMLODIPINE BESYLATE 10 MG: 5 TABLET ORAL at 08:37

## 2021-04-08 RX ADMIN — ASPIRIN 81 MG: 81 TABLET, CHEWABLE ORAL at 08:37

## 2021-04-08 RX ADMIN — INSULIN GLARGINE 20 UNITS: 100 INJECTION, SOLUTION SUBCUTANEOUS at 21:59

## 2021-04-08 RX ADMIN — BUSPIRONE HYDROCHLORIDE 5 MG: 5 TABLET ORAL at 14:53

## 2021-04-08 RX ADMIN — BUSPIRONE HYDROCHLORIDE 5 MG: 5 TABLET ORAL at 08:37

## 2021-04-08 RX ADMIN — PROPRANOLOL HYDROCHLORIDE 80 MG: 40 TABLET ORAL at 08:38

## 2021-04-08 RX ADMIN — ATORVASTATIN CALCIUM 80 MG: 80 TABLET, FILM COATED ORAL at 21:56

## 2021-04-08 RX ADMIN — CETIRIZINE HYDROCHLORIDE 10 MG: 10 TABLET, FILM COATED ORAL at 08:37

## 2021-04-08 RX ADMIN — ENOXAPARIN SODIUM 40 MG: 40 INJECTION SUBCUTANEOUS at 08:37

## 2021-04-08 RX ADMIN — BUSPIRONE HYDROCHLORIDE 5 MG: 5 TABLET ORAL at 21:56

## 2021-04-08 RX ADMIN — ALLOPURINOL 300 MG: 300 TABLET ORAL at 08:37

## 2021-04-08 RX ADMIN — Medication 5 MG: at 21:56

## 2021-04-08 ASSESSMENT — PAIN SCALES - GENERAL: PAINLEVEL_OUTOF10: 0

## 2021-04-08 ASSESSMENT — 9 HOLE PEG TEST
TEST_RESULT: FUNCTIONAL
TESTTIME_SECONDS: 25.99

## 2021-04-08 NOTE — PROGRESS NOTES
Biju Chimera  4/8/2021  [id number]    Chief Complaint: Acute ischemic stroke ([facility] [address]    Subjective:   No issues overnight; no new c/o this AM.      ROS: no n/v, cp, sob, f/c  Objective:  Patient Vitals for the past 24 hrs:   BP Temp Temp src Pulse Resp SpO2   04/08/21 0715 (!) 152/74 97.9 °F (36.6 °C) Oral 60 16 99 %   04/07/21 2135 (!) 141/78 98.2 °F (36.8 °C) Oral 75 18 98 %     Gen: No distress, pleasant. HEENT: Normocephalic, atraumatic. CV: Regular rate and rhythm. Resp: No respiratory distress. Abd: Soft, nontender   Ext: No edema. Neuro: Alert, oriented, appropriately interactive. Wt Readings from Last 3 Encounters:   04/04/21 140 lb 3.4 oz (63.6 kg)   03/16/21 162 lb (73.5 kg)   07/14/20 158 lb 14.4 oz (72.1 kg)       Laboratory data:   Lab Results   Component Value Date    WBC 4.5 04/07/2021    HGB 14.6 04/07/2021    HCT 43.4 04/07/2021    MCV 83.3 04/07/2021     (L) 04/07/2021       Lab Results   Component Value Date     04/07/2021    K 4.1 04/07/2021    K 3.4 07/14/2020     04/07/2021    CO2 29 04/07/2021    BUN 23 04/07/2021    CREATININE 1.0 04/07/2021    GLUCOSE 218 04/07/2021    CALCIUM 11.0 04/07/2021        Therapy progress:  PT  Required Braces or Orthoses  Left Lower Extremity Brace: Sharita Foot Orthotics  Position Activity Restriction  Other position/activity restrictions: up with assist  Objective     Sit to Stand: Contact guard assistance, Minimal Assistance  Stand to sit: Contact guard assistance, Minimal Assistance  Bed to Chair: Contact guard assistance, Minimal assistance  Device: Rolling Walker  Other Apparatus: Wheelchair follow  Assistance: Minimal assistance  Distance: 100 feet + 50 feet + 10 feet + 10 feet  OT  PT Equipment Recommendations  Equipment Needed: Yes  Mobility Devices: Walker, Wheelchair  Walker: Rolling  Wheelchair: Standard  Other: With swing away leg rests, and elevating arm rests.   Toilet - Technique: Stand step, To right, To left Equipment Used: Standard toilet  Toilet Transfers Comments: Pt's  providing assistance  Assessment        SLP  Current Diet : Regular  Current Liquid Diet : Thin  Diet Solids Recommendation: Regular  Liquid Consistency Recommendation: Thin    Body mass index is 27.38 kg/m². Assessment and Plan:  Acute ischemic infarct: ASA, statin.  PT/OT/SLP. Transient aphasia could be mild recrudescence vs TIA; follow.     Diabetes: control improving, Lantus 20, SSI;      HTN: propranolol 80; increased norvasc to 10     HLD: significantly elevated triglycerides, Lipitor 80     LEX: Push PO.  Patient prefers no IVF     Bowel: Per protocol  Bladder: Per protocol  Sleep: Trazodone PRN  Pain: tylenol, tramadol  DVT PPx: lovenox  CECILY: 4/10 home w/ home health  DME: Possible AFO and hyperextension knee brace, RW, manual wc with ELR and swing away arm rests, sacral cutout cushion, gait belt, shower chair, bsc, grab bars     Meka Ayala MD  04/08/21  9:37 AM

## 2021-04-08 NOTE — PROGRESS NOTES
Mobility  Functional - Mobility Device: Wheelchair  Activity: Retrieve items;Transport items; To/From therapy gym; To/from bathroom; Other  Assist Level: Supervision(min cues for LUE)  Functional Mobility Comments: Mobility with RW CGA to/from bathroom this date  Shower Transfers  Shower - Transfer From: [facility] - Transfer Type: To and From  Shower - Transfer To:  Shower seat with back  Shower - Technique: Ambulating  Shower Transfers: Contact Guard  Bed mobility  Supine to Sit: Supervision  Sit to Supine: Supervision  Transfers  Stand Step Transfers: Contact guard assistance;Stand by assistance  Stand Pivot Transfers: Contact guard assistance;Stand by assistance  Sit to stand: Stand by assistance  Stand to sit: Stand by assistance  Transfer Comments: Pt continues to require cues for hand placement        Cognition  Overall Cognitive Status: WFL  Memory: Decreased recall of precautions         Left 9-Hole Peg Test  Left 9-Hole Peg Test: Impaired(Pt able to place 1 peg in >2.5 minutes)  Right 9-Hole Peg Test  Right 9-Hole Peg Test: Functional  Fine Motor Skills  Left 9-Hole Peg Test: Impaired(Pt able to place 1 peg in >2.5 minutes)  Right 9-Hole Peg Test: Functional  Right 9 Hole Peg Test Time (secs): 25.99  Other Assessment: Box and blocks: L - 8; R - 44           Plan   Plan  Times per week: 5-7 days/ wk  Times per day: Daily  Plan weeks: 2 weeks  Current Treatment Recommendations: Balance Training, Functional Mobility Training, Safety Education & Training, Positioning, Neuromuscular Re-education, Self-Care / ADL, Strengthening, Gait Training, Patient/Caregiver Education & Training, Stair training, ROM, Equipment Evaluation, Education, & procurement, Home Management Training, Endurance Training, Pain Management, Wheelchair Mobility Training, Cognitive/Perceptual Training, Cognitive Reorientation, Modalities (comment)       Goals  Short term goals  Time Frame for Short term goals: 1 week (3-26-21)  Short term goal 1: Pt will complete functional transfers mod A - GOAL MET 3/25 mod A  Short term goal 2: Pt will complete UBD using bisi-techniques mod A - GOAL MET 3/25 mod A  Short term goal 3: min assist with LUE self ROM/positioning - GOAL MET 3/29  Short term goal 4: Pt will complete toileting mod A - GOAL MET 3/29  Long term goals  Time Frame for Long term goals : 2 weeks (4-2-21)  Long term goal 1: Pt will complete functional transfers SBA - progressing  Long term goal 2: Pt will complete UBD using bisi-techniques SBA - GOAL MET, without bra, 4/1  Long term goal 3: Pt will complete toileting CGA  Patient Goals   Patient goals : \"get stronger so I can get back to my normal routines\"       Therapy Time   Individual Concurrent Group Co-treatment   Time In 0900         Time Out 1000         Minutes 60         Timed Code Treatment Minutes: 60 Minutes     Second Session Therapy Time:   Individual Concurrent Group Co-treatment   Time In 1300        Time Out 1330        Minutes 30          Timed Code Treatment Minutes:  30    Total Treatment Minutes:  90    ROSEANNA Jackson/L

## 2021-04-08 NOTE — PROGRESS NOTES
Restrictions  Restrictions/Precautions  Restrictions/Precautions: General Precautions, Fall Risk  Required Braces or Orthoses?: Yes  Required Braces or Orthoses  Left Lower Extremity Brace: Sharita Foot Orthotics  Position Activity Restriction  Other position/activity restrictions: up with assist  Subjective   General  Chart Reviewed: Yes  Response To Previous Treatment: Patient with no complaints from previous session. Family / Caregiver Present: No  Referring Practitioner: Josep Wilson MD  Subjective  Subjective: Pt pleasant and agreeable to therapy. Pt participating well throughout the session. General Comment  Comments: RN cleared pt for therapy. Pain Screening  Patient Currently in Pain: Denies  Vital Signs  Patient Currently in Pain: Denies       Orientation  Orientation  Overall Orientation Status: Within Normal Limits  Cognition   Cognition  Overall Cognitive Status: WFL  Objective   Bed mobility  Supine to Sit: Supervision  Sit to Supine: Supervision  Transfers  Sit to Stand: Contact guard assistance;Minimal Assistance  Stand to sit: Contact guard assistance;Minimal Assistance  Bed to Chair: Contact guard assistance;Minimal assistance  Stand Pivot Transfers: Contact guard assistance;Minimal Assistance  Comment: CGA for sit<>stands with occasional Min A with RW and transfers from various surfaces with RW and clam shell for L UE support. Pt tolerating well throughout the session. Pt requires intermittent cues for hand placement and positioning. Ambulation  Ambulation?: Yes  More Ambulation?: Yes  Ambulation 1  Surface: level tile  Device: Rolling Walker  Assistance: Contact guard assistance  Quality of Gait: Pt demos improved gait with step to pattern with L LE leading, min cues for increasing step height/length, and faciliating rest breaks.   Gait Deviations: Slow Lisa;Decreased step length;Decreased step height;Shuffles;Staggers;Decreased head and trunk rotation  Distance: 83 feet + 133 feet

## 2021-04-09 LAB
ANION GAP SERPL CALCULATED.3IONS-SCNC: 11 MMOL/L (ref 3–16)
BUN BLDV-MCNC: 21 MG/DL (ref 7–20)
CALCIUM SERPL-MCNC: 11.1 MG/DL (ref 8.3–10.6)
CHLORIDE BLD-SCNC: 103 MMOL/L (ref 99–110)
CO2: 27 MMOL/L (ref 21–32)
CREAT SERPL-MCNC: 1 MG/DL (ref 0.6–1.2)
GFR AFRICAN AMERICAN: >60
GFR NON-AFRICAN AMERICAN: 55
GLUCOSE BLD-MCNC: 114 MG/DL (ref 70–99)
GLUCOSE BLD-MCNC: 115 MG/DL (ref 70–99)
GLUCOSE BLD-MCNC: 125 MG/DL (ref 70–99)
GLUCOSE BLD-MCNC: 126 MG/DL (ref 70–99)
GLUCOSE BLD-MCNC: 150 MG/DL (ref 70–99)
HCT VFR BLD CALC: 43 % (ref 36–48)
HEMOGLOBIN: 14.6 G/DL (ref 12–16)
MCH RBC QN AUTO: 28.2 PG (ref 26–34)
MCHC RBC AUTO-ENTMCNC: 34 G/DL (ref 31–36)
MCV RBC AUTO: 83 FL (ref 80–100)
PDW BLD-RTO: 14.5 % (ref 12.4–15.4)
PERFORMED ON: ABNORMAL
PLATELET # BLD: 160 K/UL (ref 135–450)
PMV BLD AUTO: 7.6 FL (ref 5–10.5)
POTASSIUM SERPL-SCNC: 3.5 MMOL/L (ref 3.5–5.1)
RBC # BLD: 5.18 M/UL (ref 4–5.2)
SODIUM BLD-SCNC: 141 MMOL/L (ref 136–145)
WBC # BLD: 4.9 K/UL (ref 4–11)

## 2021-04-09 PROCEDURE — 6360000002 HC RX W HCPCS: Performed by: PHYSICAL MEDICINE & REHABILITATION

## 2021-04-09 PROCEDURE — 1280000000 HC REHAB R&B

## 2021-04-09 PROCEDURE — 97116 GAIT TRAINING THERAPY: CPT

## 2021-04-09 PROCEDURE — 85027 COMPLETE CBC AUTOMATED: CPT

## 2021-04-09 PROCEDURE — 6370000000 HC RX 637 (ALT 250 FOR IP): Performed by: PHYSICAL MEDICINE & REHABILITATION

## 2021-04-09 PROCEDURE — 36415 COLL VENOUS BLD VENIPUNCTURE: CPT

## 2021-04-09 PROCEDURE — 97112 NEUROMUSCULAR REEDUCATION: CPT

## 2021-04-09 PROCEDURE — 80048 BASIC METABOLIC PNL TOTAL CA: CPT

## 2021-04-09 PROCEDURE — 97530 THERAPEUTIC ACTIVITIES: CPT

## 2021-04-09 PROCEDURE — 97535 SELF CARE MNGMENT TRAINING: CPT

## 2021-04-09 RX ORDER — AMLODIPINE BESYLATE 10 MG/1
10 TABLET ORAL DAILY
Qty: 30 TABLET | Refills: 3 | Status: SHIPPED | OUTPATIENT
Start: 2021-04-10

## 2021-04-09 RX ORDER — BUSPIRONE HYDROCHLORIDE 5 MG/1
5 TABLET ORAL 3 TIMES DAILY
Qty: 90 TABLET | Refills: 0 | Status: SHIPPED | OUTPATIENT
Start: 2021-04-09

## 2021-04-09 RX ADMIN — CETIRIZINE HYDROCHLORIDE 10 MG: 10 TABLET, FILM COATED ORAL at 07:52

## 2021-04-09 RX ADMIN — BUSPIRONE HYDROCHLORIDE 5 MG: 5 TABLET ORAL at 21:11

## 2021-04-09 RX ADMIN — ASPIRIN 81 MG: 81 TABLET, CHEWABLE ORAL at 07:52

## 2021-04-09 RX ADMIN — AMLODIPINE BESYLATE 10 MG: 5 TABLET ORAL at 07:52

## 2021-04-09 RX ADMIN — BUSPIRONE HYDROCHLORIDE 5 MG: 5 TABLET ORAL at 07:52

## 2021-04-09 RX ADMIN — PROPRANOLOL HYDROCHLORIDE 80 MG: 40 TABLET ORAL at 07:52

## 2021-04-09 RX ADMIN — BUSPIRONE HYDROCHLORIDE 5 MG: 5 TABLET ORAL at 14:13

## 2021-04-09 RX ADMIN — ENOXAPARIN SODIUM 40 MG: 40 INJECTION SUBCUTANEOUS at 07:51

## 2021-04-09 RX ADMIN — ATORVASTATIN CALCIUM 80 MG: 80 TABLET, FILM COATED ORAL at 21:11

## 2021-04-09 RX ADMIN — ALLOPURINOL 300 MG: 300 TABLET ORAL at 07:52

## 2021-04-09 RX ADMIN — INSULIN GLARGINE 20 UNITS: 100 INJECTION, SOLUTION SUBCUTANEOUS at 21:12

## 2021-04-09 RX ADMIN — Medication 5 MG: at 21:11

## 2021-04-09 ASSESSMENT — PAIN SCALES - GENERAL: PAINLEVEL_OUTOF10: 0

## 2021-04-09 NOTE — PROGRESS NOTES
PT IRF:    CARE Score: 6                                   Pt. Currently ambulates 165 feet with RW and CGA<>SBA. Primarily walks short distances with improved balance. Up/down 4 steps with bilateral HR and Mod A  Up/down curb step with CGA with RW  Sit to/from stand with SBA<>CGA  Bed mobility with IND in flat bed  Recommend RW for all transfers and ambulation in order to decrease the risk for falls and increase stability  Pt. Safe to return home with assistance from  who is able to provide 24hr supervision/assist.   Provided pt. with written HEP (Reviewed on 4/8) to progress AROM and strength in BLE.   Electronically signed by Gopal Bernabe, PT on 4/9/2021 at 3:52 PM

## 2021-04-09 NOTE — CARE COORDINATION
[facility]    [facility] notified dc order in epic and ready to pull  Notified insurance OON  Called [facility] unable to staff  [facility] faxed insurance info to see if in network; notified [facility] accepted referral    [facility] called able to do soc Sunday; will pull from [id number], [place]  [facility] [phone]

## 2021-04-09 NOTE — PROGRESS NOTES
Occupational Therapy  Discharge Summary     Name:Conchis Noland  IKER:[id number]  :1950  Treatment Diagnosis: Impaired functional mobilty  Diagnosis: acute left sided weakness    Restrictions/Precautions  Restrictions/Precautions: General Precautions, Fall Risk  Required Braces or Orthoses?: Yes           Position Activity Restriction  Other position/activity restrictions: up with assist     Goals:   Short term goals  Time Frame for Short term goals: 1 week (3-26-21)  Short term goal 1: Pt will complete functional transfers mod A - GOAL MET 3/25  Short term goal 2: Pt will complete UBD using bisi-techniques mod A - GOAL MET 3/25  Short term goal 3: min assist with LUE self ROM/positioning - GOAL MET 3/29  Short term goal 4: Pt will complete toileting mod A - GOAL MET 3/29   Long term goals  Time Frame for Long term goals : 2 weeks (21)  Long term goal 1: Pt will complete functional transfers SBA - GOAL MET   Long term goal 2: Pt will complete UBD using bisi-techniques SBA - GOAL MET, without bra,   Long term goal 3: Pt will complete toileting CGA - GOAL MET     Pt. Met  short term goals and 3/3 long term goals. Current Functional Status:   ADL  Feeding: Modified independent   Grooming: Modified independent (seated w/c level)  UE Bathing: Setup  LE Bathing: Modified independent (seated)  UE Dressing: Modified independent   LE Dressing: Minimal assistance, Setup, Verbal cueing, Increased time to complete(assist for RICK hose only, SBA for pants/brief)  Toileting: Stand by assistance  Additional Comments: Pt retrieved and put away items needed for ADL w/c level with Mod I    Bed mobility  Rolling to Left: Independent  Rolling to Right: Independent  Supine to Sit: Modified independent(HOB elevated)  Sit to Supine: Unable to assess(pt left up in chair)  Scooting: Moderate assistance, Minimal assistance  Comment: HOB elevated, supervision for positioning. Functional Transfers:   Toilet Transfers  Toilet - Technique: Stand step, To right, To left, Stand pivot  Equipment Used: Standard toilet  Toilet Transfer: Stand by assistance  Toilet Transfers Comments: Pt's  providing assistance         Shower Transfers  Shower - Transfer From: Sujatha Avelar - Transfer Type: To and From  Shower - Transfer To: Shower seat with back  Shower - Technique: Ambulating  Shower Transfers: Contact Guard  Shower Transfers Comments: Simulated home walk-in shower setup with small, ~4 inch, lip and shower chair, with pt performing with cues, pivoting to R and L and up to Min A provided by pt's . Pt's  reports able to remove sliding doors from shower at home and provided pictures of setup. Functional Mobility  Functional - Mobility Device: Wheelchair  Activity: Retrieve items, Transport items, To/From therapy gym, To/from bathroom, Other  Assist Level: Modified independent   Functional Mobility Comments: Up to CGA with RW for short distance ambulation    Instrumental ADL's  Instrumental ADLs: Yes(Pt reaching into upper shelf in therapy kitchen in stance with countertop support with CGA this date, cues for hand placement. Pt retrieved items from drawers, lower cabinets, and refrigerator level w/c level with Supervision)       Perception  Overall Perceptual Status: Impaired  Unilateral Attention: Cues to attend to left side of body    UE Function:  Hand dominance: Right   Fine Motor Skills  Left 9-Hole Peg Test: Impaired(Pt able to place 1 peg in >2.5 minutes)  Right 9-Hole Peg Test: Functional  Right 9 Hole Peg Test Time (secs): 25.99  Other Assessment: Box and blocks: L - 8; R - 44      Assessment:   Assessment: Pt continues to be very pleasant and motivated to participate in therapy, with improving level of assistance for functional transfers this date, meeting all established goals this date.  Pt expresses no immediate OT concerns for home, but plans to continue therapy at home health level initially. Pt continues to be limited by LUE control, but motivated to engage and with good recall/implementation of compensatory strategies. Continue OT tx. Prognosis: Good     REQUIRES OT FOLLOW UP: Yes  Discharge Recommendations: 24 hour supervision or assist, Home with Home health OT    Equipment Recommendations:  BSC, shower chair with back        Home Exercise Program  Provided Pt with theraputty, BUE, standing-level, FM/in-hand manipulation HEPs, with pt verbalizing/demonstrating understanding,  providing assist for standing-level exercises; Pt also given access to videos of each exercise.     Electronically signed by Jl Cervantes OT on 4/9/2021 at 10:13 AM

## 2021-04-09 NOTE — PROGRESS NOTES
treatment well  Activity Tolerance: /81, HR 73, Spo2 98% on RA  Safety Devices  Safety Devices in place: Yes  Type of devices: Call light within reach;Gait belt;Nurse notified; Chair alarm in place; Left in chair         Patient Diagnosis(es): There were no encounter diagnoses. has a past medical history of Diabetes mellitus ([facility] [address] GERD (gastroesophageal reflux disease), and Hypertension. has no past surgical history on file. Restrictions  Restrictions/Precautions  Restrictions/Precautions: General Precautions, Fall Risk  Required Braces or Orthoses?: Yes  Required Braces or Orthoses  Left Lower Extremity Brace: Sharita Foot Orthotics  Position Activity Restriction  Other position/activity restrictions: up with assist  Subjective   General  Chart Reviewed: Yes  Patient assessed for rehabilitation services?: Yes  Response to previous treatment: Patient with no complaints from previous session  Family / Caregiver Present: No  Referring Practitioner: Josep Wilson MD  Diagnosis: CVA with Left hemiparesis  Subjective  Subjective: Pt in bed on arrival, very pleasant and agreeable to treatment, eager to d/c home tomorrow  General Comment  Comments: RN cleared pt for OT; pt resting in bed, agreeable to therapy  Vital Signs  Patient Currently in Pain: Denies   Orientation  Orientation  Overall Orientation Status: Within Normal Limits  Objective    ADL  Feeding: Modified independent   Grooming: Modified independent (seated w/c level)  LE Bathing: Modified independent (seated)  UE Dressing: Modified independent   LE Dressing: Minimal assistance;Setup;Verbal cueing; Increased time to complete(assist for RICK hose only, SBA for pants/brief)  Toileting: Stand by assistance  Additional Comments: Pt retrieved and put away items needed for ADL w/c level with Mod I        Balance  Sitting Balance: Modified independent (including dynamic sitting, reaching to floor level seated w/c level)  Standing Balance: Stand by assistance(with LE support against chair as strategy at times, UE support)  Standing Balance  Time: 1-2 minutes, <1 minute over multiple trials  Activity: mobility, transfers, standing ADL  Functional Mobility  Functional - Mobility Device: Wheelchair  Activity: Retrieve items;Transport items; To/From therapy gym; To/from bathroom; Other  Assist Level: Modified independent   Functional Mobility Comments: Up to CGA with RW for short distance ambulation  Toilet Transfers  Toilet - Technique: Stand step; To right; To left;Stand pivot  Equipment Used: Standard toilet  Toilet Transfer: Stand by assistance  Bed mobility  Supine to Sit: Modified independent(HOB elevated)  Sit to Supine: Unable to assess(pt left up in chair)  Transfers  Stand Step Transfers: Stand by assistance  Stand Pivot Transfers: Stand by assistance  Sit to stand: Stand by assistance  Stand to sit: Stand by assistance  Transfer Comments: Min cues for w/c positioning and to ensure brakes in place prior to attempting transfers        Coordination  Fine Motor: Pt demonstrated/verbalized understanding of items included on FM/in-hand manipulation HEPs issued this date        Cognition  Overall Cognitive Status: WFL  Memory: Decreased recall of precautions  Safety Judgement: Decreased awareness of need for assistance     Perception  Overall Perceptual Status: Impaired  Unilateral Attention: Cues to attend to left side of body        Type of ROM/Therapeutic Exercise  Comment: Pt verbalized/demonstrate understanding of seated UE HEP issued this date, demonstrated understanding of standing-level exercises earlier this week with assistance provided by her ;  Pt also given access to videos of each exercise   UE HEP: trunk rotation, seated sidebends, diagonal chops, shoulder elevation/depression, scapular retraction/protraction, shoulder flexion to 90*, shoulder horizontal abduction/adduction, chest press (not to full range), elbow flexion/ext,

## 2021-04-09 NOTE — CARE COORDINATION
CASE MANAGEMENT DISCHARGE SUMMARY      Discharge to: home with [facility] they are gathering all needed information from the pt chart     Precertification completed: none   Hospital Exemption Notification (HENS) completed: none     New Durable Medical Equipment ordered/agency:  DME thru [facility]     Transportation:    Family/car: pt spouse at 11am        Confirmed discharge plan with:     Patient: yes     Family, name and contact number: Pt spouse via phone      Weekend staff has been updated on the Pt plan for [facility]

## 2021-04-09 NOTE — PROGRESS NOTES
Tiago Cap  4/9/2021  [id number]    Chief Complaint: Acute ischemic stroke [facility])    Subjective:   Resting in bed; looking forward to upcoming discharge. ROS: no n/v, cp, sob, f/c  Objective:  Patient Vitals for the past 24 hrs:   BP Temp Temp src Pulse Resp SpO2   04/09/21 0750 (!) 144/81 97.9 °F (36.6 °C) Oral 73 16 98 %   04/08/21 2145 (!) 151/67 98.2 °F (36.8 °C) Oral 64 16 98 %     Gen: No distress, pleasant. HEENT: Normocephalic, atraumatic. CV: Regular rate and rhythm. Resp: No respiratory distress. Abd: Soft, nontender   Ext: No edema. Neuro: Alert, oriented, appropriately interactive. Wt Readings from Last 3 Encounters:   04/04/21 140 lb 3.4 oz (63.6 kg)   03/16/21 162 lb (73.5 kg)   07/14/20 158 lb 14.4 oz (72.1 kg)       Laboratory data:   Lab Results   Component Value Date    WBC 4.5 04/07/2021    HGB 14.6 04/07/2021    HCT 43.4 04/07/2021    MCV 83.3 04/07/2021     (L) 04/07/2021       Lab Results   Component Value Date     04/07/2021    K 4.1 04/07/2021    K 3.4 07/14/2020     04/07/2021    CO2 29 04/07/2021    BUN 23 04/07/2021    CREATININE 1.0 04/07/2021    GLUCOSE 218 04/07/2021    CALCIUM 11.0 04/07/2021        Therapy progress:  PT  Required Braces or Orthoses  Left Lower Extremity Brace: Sharita Foot Orthotics  Position Activity Restriction  Other position/activity restrictions: up with assist  Objective     Sit to Stand: Contact guard assistance, Minimal Assistance  Stand to sit: Contact guard assistance, Minimal Assistance  Bed to Chair: Contact guard assistance, Minimal assistance  Device: Rolling Walker  Other Apparatus: Wheelchair follow  Assistance: Contact guard assistance  Distance: 83 feet + 133 feet  OT  PT Equipment Recommendations  Equipment Needed: Yes  Mobility Devices: Walker, Wheelchair  Walker: Rolling  Wheelchair: Standard  Other: With swing away leg rests, and elevating arm rests.   Toilet - Technique: Stand step, To right, To left

## 2021-04-09 NOTE — PROGRESS NOTES
Physical Therapy  Facility/Department: [facility]  Daily Treatment Note  NAME: Altagracia Evans  : 1950  MRN: [id number]    Date of Service: 2021    Discharge Recommendations:  24 hour supervision or assist, Home with Home health PT   PT Equipment Recommendations  Equipment Needed: Yes  Mobility Devices: Saralyn Dyer: Rolling  Wheelchair: Standard  Other: With swing away leg rests, and elevating arm rests. Pt has all DME in room at end of session.  is bringing RW tomorrow. Assessment   Body structures, Functions, Activity limitations: Decreased functional mobility ; Decreased balance;Decreased strength;Decreased coordination;Decreased endurance  Assessment: Pt seen for final therapy session. Pt ambulated 165 feet with RW and CGA with extended time to complete. Pt able to complete transfers with SBA<>CGA with RW for support, pt able to complete SPT with CGA<>Min A pending set up and positioning. Pt completed 4 steps with bilateral HR with Mod A and extended time- pt limited by fatigue. Pt is progressing well, but requires extended time to complete with cues for safety throughout. Adjusted pt's new w/c to bring home to promote IND with w/c mobility. Pt is progressing well with PT and will benefit from additional home PT with progression to OP PT. Continue POC to increase independence. Treatment Diagnosis: Impaired functional mobilty  Prognosis: Good  PT Education: Goals;PT Role;General Safety;Home Exercise Program;Transfer Training;Precautions; Functional Mobility Training;Gait Training;Energy Conservation;Plan of Care  Patient Education: Education on positioning and sequencing for transfers with safety. Pt demos good awareness with intermittent cues. Barriers to Learning: none  REQUIRES PT FOLLOW UP: Yes  Activity Tolerance  Activity Tolerance: Patient Tolerated treatment well  Activity Tolerance: Pt tolerated well with good endurance.      Patient Diagnosis(es): There were no encounter diagnoses. has a past medical history of Diabetes mellitus ([facility] [address] GERD (gastroesophageal reflux disease), and Hypertension. has no past surgical history on file. Restrictions  Restrictions/Precautions  Restrictions/Precautions: General Precautions, Fall Risk  Required Braces or Orthoses?: Yes  Required Braces or Orthoses  Left Lower Extremity Brace: Sharita Foot Orthotics  Position Activity Restriction  Other position/activity restrictions: up with assist  Subjective   General  Chart Reviewed: Yes  Response To Previous Treatment: Patient with no complaints from previous session. Family / Caregiver Present: No  Referring Practitioner: Maykel Frausto MD  Subjective  Subjective: Pt pleasant and agreeable to therapy. Pt participating well throughout the session. General Comment  Comments: RN cleared pt for therapy. Pain Screening  Patient Currently in Pain: Denies  Vital Signs  Patient Currently in Pain: Denies       Orientation  Orientation  Overall Orientation Status: Within Normal Limits  Cognition   Cognition  Overall Cognitive Status: WFL  Objective   Bed mobility  Rolling to Left: Independent  Rolling to Right: Independent  Supine to Sit: Independent  Sit to Supine: Independent  Scooting: Independent  Comment: IND with flat bed and no rails used. Transfers  Sit to Stand: Stand by assistance;Contact guard assistance  Stand to sit: Stand by assistance;Contact guard assistance  Bed to Chair: Contact guard assistance;Minimal assistance  Stand Pivot Transfers: Contact guard assistance;Stand by assistance  Car Transfer: Contact guard assistance;Stand by assistance  Comment: SBA with RW for sit<>stand transfers, CGA without AD for sit<>stands, stand pivot transfers Min A<>CGA with cues for hand placement, bed<>w/c<>chairs with SBA<>CGA. Car transfer with SBA<>CGA with RW and min cues for hand placement.   Ambulation  Ambulation?: Yes  More Ambulation?: Yes  Ambulation 1  Surface: level tile  Device: Rolling Walker  Assistance: Contact guard assistance;Stand by assistance  Quality of Gait: Pt demos improved gait with partial step through pattern, L LE AFO, increased step length/height, pt tolerating well with SBA<>CGA pending pt's fatigue. Pt able to manage rest rbeaks appropriately today. Gait Deviations: Slow Lisa;Decreased step length;Decreased step height;Shuffles;Staggers;Decreased head and trunk rotation  Distance: 165 feet + 100 feet + 60 feet  Ambulation 2  Surface - 2: uneven  Device 2: Rolling Walker  Assistance 2: Contact guard assistance;Stand by assistance  Quality of Gait 2: Partial step through pattern, decreased step length/step height, min cues for increasing step height. Gait Deviations: Slow Lisa;Decreased step length;Shuffles;Decreased step height  Distance: 20 feet + 20 feet  Stairs/Curb  Stairs?: Yes  Stairs  # Steps : 4  Rails: Bilateral  Curbs: 6\"  Device: Rolling walker  Assistance: Contact guard assistance; Moderate assistance  Comment: CGA for curb step with RW with cues for positioning and sequencing. Mod A for negotiating 4 steps with bilateral HR with faciliating of left UE. Slow to complete. Wheelchair Activities  Left Leg Rest Level of Assistance: Independent  Right Leg Rest Level of Assistance: Independent  Left Brakes Level of Assistance: Independent  Right Brakes Level of Assistance: Independent  Propulsion: Yes  Propulsion 1  Propulsion: Manual  Level: Level Tile  Method: LLE;RLE;RUE  Level of Assistance: Modified independent  Description/ Details: Pt slow to complete, but demos appropriate sequencing and positioning throughout. Distance: 150 feet with multiple turns and rest breaks. Balance  Posture: Fair  Sitting - Static: Good  Sitting - Dynamic: Good;-  Standing - Static: Fair;+  Standing - Dynamic: Fair  Comments: Dynamic standing balance for ADLs with CGA with RW.  Pt able to  object from floor with Min A for balance with w/c posterior to pt if feeling off balance. Exercises  Comments: Pt denies any questions about HEP from yesterday. Goals  Short term goals  Time Frame for Short term goals: 1 week, 3/26/21  Short term goal 1: Pt will complete bed mobility with SBA and no rails. -- GOAL PARTIALLY MET 4/5: Supine to sit SBA with raised HOB and rails. Short term goal 2: Pt will complete sit<>stand with Min A with LRAD. -- GOAL MET Piotr to CGA with RW  Short term goal 3: Pt will ambulate 100 feet with LRAD and Mod Ax1. -- GOAL MET Piotr x 1 up to 100 ft with RW  Short term goal 4: Pt will complete bed<>chair transfer with LRAD and Min A. -- GOAL MET Piotr to CGA with RW  Short term goal 5: Pt will complete 150 feet of w/c mobility with multiple turns and IND. GOAL MET 4/7: 150 feet with supervision  Long term goals  Time Frame for Long term goals : 2 weeks, by 4/2/21; updated to 4/10 per planned d/c date  Long term goal 1: Pt will complete bed mobility with independence in flat bed without rails. 4/9 GOAL MET  Long term goal 2: Pt will complete sit<>stands with independence and LRAD. 4/9 GOAL NOT MET- Pt completes sit<>stands with SBA<>CGA with RW. Long term goal 3: Pt will complete bed<>Chair transfer with LRAD and independence. 4/9 GOAL NOT MET- Pt completes transfers with RW and CGA. Long term goal 4: Pt will ambulate 100 feet with LRAD and independence. 4/9- GOAL NOT MET- pt ambulates 165 ft with RW and CGA. Long term goal 5: Pt will ascend/descend 4 steps with bilateral rails and CGA. 4/9- GOAL NOT MET- pt completes 4 steps with BUE support and Mod A. Patient Goals   Patient goals : To get stronger and go home.     Plan    Plan  Times per week: 5-7  Times per day: Daily  Plan weeks: 2 weeks, by 4/9/21  Current Treatment Recommendations: Strengthening, Balance Training, Transfer Training, Neuromuscular Re-education, Home Exercise Program, Endurance Training, Gait Training, Functional Mobility Training, Wheelchair Mobility Training  Plan Comment: progress functional mobility as tolerated  Safety Devices  Type of devices: Call light within reach, Gait belt, Nurse notified, All fall risk precautions in place, Left in bed, Bed alarm in place     Therapy Time   Individual Concurrent Group Co-treatment   Time In 1100         Time Out 1130         Minutes 30         Timed Code Treatment Minutes: 30 Minutes     Second Session Therapy Time:   Individual Concurrent Group Co-treatment   Time In 1330         Time Out 1430         Minutes 60         Timed Code Treatment Minutes:  60 Minutes    Total Treatment Minutes:  [facility], PT

## 2021-04-10 VITALS
HEIGHT: 60 IN | TEMPERATURE: 98.2 F | OXYGEN SATURATION: 97 % | RESPIRATION RATE: 16 BRPM | HEART RATE: 70 BPM | SYSTOLIC BLOOD PRESSURE: 134 MMHG | WEIGHT: 140.21 LBS | DIASTOLIC BLOOD PRESSURE: 77 MMHG | BODY MASS INDEX: 27.53 KG/M2

## 2021-04-10 LAB
GLUCOSE BLD-MCNC: 111 MG/DL (ref 70–99)
PERFORMED ON: ABNORMAL

## 2021-04-10 PROCEDURE — 6370000000 HC RX 637 (ALT 250 FOR IP): Performed by: PHYSICAL MEDICINE & REHABILITATION

## 2021-04-10 PROCEDURE — 6360000002 HC RX W HCPCS: Performed by: PHYSICAL MEDICINE & REHABILITATION

## 2021-04-10 RX ADMIN — ENOXAPARIN SODIUM 40 MG: 40 INJECTION SUBCUTANEOUS at 09:14

## 2021-04-10 RX ADMIN — BUSPIRONE HYDROCHLORIDE 5 MG: 5 TABLET ORAL at 09:13

## 2021-04-10 RX ADMIN — ASPIRIN 81 MG: 81 TABLET, CHEWABLE ORAL at 09:13

## 2021-04-10 RX ADMIN — AMLODIPINE BESYLATE 10 MG: 5 TABLET ORAL at 09:13

## 2021-04-10 RX ADMIN — PROPRANOLOL HYDROCHLORIDE 80 MG: 40 TABLET ORAL at 09:13

## 2021-04-10 RX ADMIN — CETIRIZINE HYDROCHLORIDE 10 MG: 10 TABLET, FILM COATED ORAL at 09:14

## 2021-04-10 RX ADMIN — ALLOPURINOL 300 MG: 300 TABLET ORAL at 09:13

## 2021-04-10 ASSESSMENT — PAIN SCALES - GENERAL
PAINLEVEL_OUTOF10: 0
PAINLEVEL_OUTOF10: 0

## 2021-04-10 NOTE — PROGRESS NOTES
Pt given discharge instructions. Script for Lipitor not called to [facility], order received by Dr. Emiliana Crespo and called to [facility]. Pt transferred via [facility] to personal vehicle with belongings.

## 2021-04-12 ENCOUNTER — CARE COORDINATION (OUTPATIENT)
Dept: CASE MANAGEMENT | Age: 71
End: 2021-04-12

## 2021-04-12 NOTE — CARE COORDINATION
[facility] Initial Follow Up Call    Call within 2 business days of discharge: Yes    Patient: Monique Shields Patient : 1950   MRN: [id number]  Reason for Admission: Stroke  Discharge Date: 4/10/21 RARS: Readmission Risk Score: 17      Last Discharge [address]       Complaint Diagnosis Description Type Department Provider    3/19/21   Admission (Discharged) Binh Grider MD           Spoke with: [address]: [facility]      Was this an external facility discharge? No Discharge Facility: na    Challenges to be reviewed by the provider   Additional needs identified to be addressed with provider yes  medications-see note             Method of communication with provider : phone      Advance Care Planning:   Does patient have an Advance Directive:  not on file. Was this a readmission? No  Patient stated reason for admission: stroke  Patients top risk factors for readmission: medical condition    Care Transition Nurse (CTN) contacted the patient by telephone to perform post hospital discharge assessment. Verified name and  with patient as identifiers. Provided introduction to self, and explanation of the CTN role. CTN reviewed discharge instructions, medical action plan and red flags with patient who verbalized understanding. Patient given an opportunity to ask questions and does not have any further questions or concerns at this time. Were discharge instructions available to patient? Yes. Reviewed appropriate site of care based on symptoms and resources available to patient including: PCP. The patient agrees to contact the PCP office for questions related to their healthcare. Medication reconciliation was performed with patient, who verbalizes understanding of administration of home medications. Advised obtaining a 90-day supply of all daily and as-needed medications. Covid Risk Education    Patient has following risk factors of: diabetes. needs. Care Transitions 24 Hour Call    Do you have any ongoing symptoms?: No  Do you have a copy of your discharge instructions?: Yes  Do you have all of your prescriptions and are they filled?: Yes  Have you been contacted by a [address]?: No  Have you scheduled your follow up appointment?: Yes  How are you going to get to your appointment?: Car - family or friend to transport  Were you discharged with any Home Care or Post Acute Services: Yes  Post Acute Services: [address] [place] [address] Aime Kirk  Do you feel like you have everything you need to keep you well at home?: Yes  Care Transitions Interventions         Follow Up  No future appointments.     Alec Joshua RN

## 2021-04-13 NOTE — DISCHARGE SUMMARY
Mobility:  , PT Equipment Recommendations  Equipment Needed: Yes  Mobility Devices: Reather Left: Rolling  Wheelchair: Standard  Other: With swing away leg rests, and elevating arm rests. Pt has all DME in room at end of session.  is bringing RW tomorrow., Assessment: Pt seen for final therapy session. Pt ambulated 165 feet with RW and CGA with extended time to complete. Pt able to complete transfers with SBA<>CGA with RW for support, pt able to complete SPT with CGA<>Min A pending set up and positioning. Pt completed 4 steps with bilateral HR with Mod A and extended time- pt limited by fatigue. Pt is progressing well, but requires extended time to complete with cues for safety throughout. Adjusted pt's new w/c to bring home to promote IND with w/c mobility. Pt is progressing well with PT and will benefit from additional home PT with progression to OP PT. Continue POC to increase independence. Occupational therapy:  ,  , Assessment: Pt continues to be very pleasant and motivated to participate in therapy, with improving level of assistance for functional transfers this date, meeting all established goals this date. Pt expresses no immediate OT concerns for home, but plans to continue therapy at home health level initially. Pt continues to be limited by LUE control, but motivated to engage and with good recall/implementation of compensatory strategies. Continue OT tx. Speech therapy:       Inpatient Rehabilitation Course:   Biju Canas is a 69 y.o. female admitted to inpatient rehabilitation on 3/19/2021 s/p Acute ischemic stroke ([address] The patient participated in an aggressive multidisciplinary inpatient rehabilitation program involving 3 hours of therapy per day, at least 5 days per week. Rehab stay uncomplicated; diabetes medications and antihypertensives titrated a bit.       Discharge Exam:  Constitutional: Pleasant, no distress  Head: Normocephalic  Eyes: Conjunctiva noninjected  Pulm: CTA bilat  CV: No murmurs noted  Abd: Soft, nontender  Ext: No edema  Neuro: Alert, fully oriented, appropriate   MSK: No joint abnormalities noted     Significant Diagnostics:   Lab Results   Component Value Date    CREATININE 1.0 04/09/2021    BUN 21 (H) 04/09/2021     04/09/2021    K 3.5 04/09/2021     04/09/2021    CO2 27 04/09/2021       Lab Results   Component Value Date    WBC 4.9 04/09/2021    HGB 14.6 04/09/2021    HCT 43.0 04/09/2021    MCV 83.0 04/09/2021     04/09/2021       Disposition:  Home in stable condition    Follow-up:  See after visit summary from hospitalization    Discharge Medications:     Medication List      START taking these medications    atorvastatin 80 MG tablet  Commonly known as: LIPITOR  Take 1 tablet by mouth daily     busPIRone 5 MG tablet  Commonly known as: BUSPAR  Take 1 tablet by mouth 3 times daily        CHANGE how you take these medications    amLODIPine 10 MG tablet  Commonly known as: NORVASC  Take 1 tablet by mouth daily  What changed:   · medication strength  · how much to take        CONTINUE taking these medications    allopurinol 300 MG tablet  Commonly known as: ZYLOPRIM     aspirin 81 MG chewable tablet  Take 1 tablet by mouth daily With food     cetirizine 10 MG tablet  Commonly known as: ZYRTEC     cycloSPORINE 0.05 % ophthalmic emulsion  Commonly known as: RESTASIS     insulin glargine 100 UNIT/ML injection vial  Commonly known as: LANTUS  Inject 18 Units into the skin nightly  Notes to patient: Pt does not take at home or has no script     * insulin lispro 100 UNIT/ML injection vial  Commonly known as: HUMALOG  Inject 0-12 Units into the skin 3 times daily (with meals)  Notes to patient: No script and pt does not take at home     * insulin lispro 100 UNIT/ML injection vial  Commonly known as: HUMALOG  Inject 0-6 Units into the skin nightly  Notes to patient: No script and pt does not take at home     polyethylene glycol 17 g packet  Commonly known as: GLYCOLAX  Take 17 g by mouth daily as needed for Constipation     propranolol 80 MG tablet  Commonly known as: INDERAL         * This list has 2 medication(s) that are the same as other medications prescribed for you. Read the directions carefully, and ask your doctor or other care provider to review them with you. Where to Get Your Medications      These medications were sent to [address] [facility], [address] [address]    Phone: [phone]   · amLODIPine 10 MG tablet  · busPIRone 5 MG tablet     Information about where to get these medications is not yet available    Ask your nurse or doctor about these medications  · atorvastatin 80 MG tablet         I spent over 35 minutes on this discharge encounter between counseling, coordination of care, and medication reconciliation.   To comply with [facility] bylaw R.II.4.1: Discharge order placed in advance to facilitate discharge planning with rehab team.     Kasey Millard MD

## 2021-04-19 ENCOUNTER — CARE COORDINATION (OUTPATIENT)
Dept: CASE MANAGEMENT | Age: 71
End: 2021-04-19

## 2021-04-26 ENCOUNTER — CARE COORDINATION (OUTPATIENT)
Dept: CASE MANAGEMENT | Age: 71
End: 2021-04-26

## 2021-04-26 NOTE — CARE COORDINATION
[facility] 45 Transitions Follow Up Call    2021    Patient: Ibis Ocasio  Patient : 1950   MRN: [id number]  Reason for Admission: stroke  Discharge Date: 4/10/21 RARS: Readmission Risk Score: 17         Attempted to reach patient via phone for transition call. VM left stating purpose of call along with my contact information requesting a return call. Care Transitions Subsequent and Final Call    Subsequent and Final Calls  Care Transitions Interventions  Other Interventions: Follow Up  No future appointments.     Yesica Luna RN

## 2021-04-27 ENCOUNTER — CARE COORDINATION (OUTPATIENT)
Dept: CASE MANAGEMENT | Age: 71
End: 2021-04-27

## 2021-05-04 ENCOUNTER — CARE COORDINATION (OUTPATIENT)
Dept: CASE MANAGEMENT | Age: 71
End: 2021-05-04

## 2021-05-04 NOTE — CARE COORDINATION
"""Based on increased c/d ratio and/or ocular hypertension [facility] 45 Transitions Follow Up Call    2021    Patient: Keya Morris  Patient : 1950   MRN: [id number]  Reason for Admission: stroke  Discharge Date: 4/10/21 RARS: Readmission Risk Score: 17         Spoke with: Keya Morris    Patient answered call and verified . Patient feeling well and continues to have physical therapy and home care nurse. Patient continues to get stronger, but taking breaks when needed. Patient denied any medication changes and confirmed that she is taking all medication as prescribed. patient scheduled for follow up MD visit next week and confirmed that she had transportation arranged. Denies any acute needs at present time. patient stable and no further transition calls needed. Educated on the use of urgent care or physicians 24 hr access line if assistance is needed after hours. Episode closed      Care Transitions Subsequent and Final Call    Subsequent and Final Calls  Do you have any ongoing symptoms?: No  Have your medications changed?: No  Do you have any questions related to your medications?: No  Do you currently have any active services?: Yes  Are you currently active with any services?: Home Health  Do you have any needs or concerns that I can assist you with?: No  Identified Barriers: None  Care Transitions Interventions  Other Interventions: Follow Up  No future appointments.     Ene Ceballos RN

## 2021-08-30 ENCOUNTER — HOSPITAL ENCOUNTER (OUTPATIENT)
Age: 71
Discharge: HOME | End: 2021-08-30
Payer: COMMERCIAL

## 2021-08-30 PROCEDURE — 84165 PROTEIN E-PHORESIS SERUM: CPT

## 2021-08-30 PROCEDURE — 36415 COLL VENOUS BLD VENIPUNCTURE: CPT

## 2021-08-30 PROCEDURE — 84155 ASSAY OF PROTEIN SERUM: CPT

## 2021-08-30 PROCEDURE — 86803 HEPATITIS C AB TEST: CPT

## 2021-08-30 PROCEDURE — 86334 IMMUNOFIX E-PHORESIS SERUM: CPT

## 2021-08-30 PROCEDURE — 84439 ASSAY OF FREE THYROXINE: CPT

## 2021-08-30 PROCEDURE — 86022 PLATELET ANTIBODIES: CPT

## 2021-08-30 PROCEDURE — 83010 ASSAY OF HAPTOGLOBIN QUANT: CPT

## 2021-08-31 LAB
HAPTOGLOBIN: 111 MG/DL (ref 30–200)
HEPATITIS C ANTIBODY INTERPRETATION: NORMAL
T4 FREE: 1.4 NG/DL (ref 0.9–1.8)

## 2021-09-01 LAB
ALBUMIN SERPL-MCNC: 3.9 G/DL (ref 3.1–4.9)
ALPHA-1-GLOBULIN: 0.3 G/DL (ref 0.2–0.4)
ALPHA-2-GLOBULIN: 0.9 G/DL (ref 0.4–1.1)
BETA GLOBULIN: 1 G/DL (ref 0.9–1.6)
GAMMA GLOBULIN: 0.7 G/DL (ref 0.6–1.8)
SPE/IFE INTERPRETATION: NORMAL
TOTAL PROTEIN: 6.8 G/DL (ref 6.4–8.2)

## 2021-09-03 LAB — PLATELET ANTIBODY INDIRECT: NORMAL

## 2021-09-18 ENCOUNTER — HOSPITAL ENCOUNTER (OUTPATIENT)
Dept: ULTRASOUND IMAGING | Age: 71
Discharge: HOME | End: 2021-09-18
Payer: COMMERCIAL

## 2021-09-18 DIAGNOSIS — D69.6 THROMBOPENIA (HCC): ICD-10-CM

## 2021-09-18 PROCEDURE — 76705 ECHO EXAM OF ABDOMEN: CPT

## 2022-03-23 NOTE — PLAN OF CARE
Problem: Falls - Risk of:  Goal: Will remain free from falls  Description: Will remain free from falls  Outcome: Ongoing     Problem: Skin Integrity:  Goal: Absence of new skin breakdown  Description: Absence of new skin breakdown  Outcome: Ongoing Detail Level: Detailed Show Follow-Up Variable: Yes

## 2023-01-26 NOTE — PROGRESS NOTES
Occupational Therapy  Facility/Department: [facility]  Daily Treatment Note  NAME: Angelica Gonzalez  : 1950  MRN: [id number]    Date of Service: 3/29/2021    Discharge Recommendations:  24 hour supervision or assist, Outpatient OT, Continue to assess pending progress  OT Equipment Recommendations  Other: CTA pending progress    Assessment   Performance deficits / Impairments: Decreased functional mobility ; Decreased endurance;Decreased coordination;Decreased ADL status; Decreased posture;Decreased ROM; Decreased balance;Decreased strength;Decreased safe awareness;Decreased high-level IADLs;Decreased cognition;Decreased vision/visual deficit; Decreased fine motor control  Assessment: Pt continues to be very pleasant and motivated to participate in therapy, with improving levels of assistance for pivot transfers to R side, but with difficulty to L side this date, requiring up to Max A for safe pivot transfer to w/c one trial. Pt continues to require cues for LUE awareness, verbalizing understanding of all education provided during both sessions, but could use reinforcement. Continue OT tx.  OT Education: OT Role;Plan of Care;Transfer Training;ADL Adaptive Strategies; Home Exercise Program;Equipment; Energy Conservation;Precautions  Patient Education: Importance of mobility and engaging LUE, LUE positioning  REQUIRES OT FOLLOW UP: Yes  Activity Tolerance  Activity Tolerance: Patient Tolerated treatment well  Activity Tolerance: /78, HR 63, Spo2 98% on RA  Safety Devices  Safety Devices in place: Yes  Type of devices: Bed alarm in place;Call light within reach;Gait belt;Left in bed;Nurse notified         Patient Diagnosis(es): There were no encounter diagnoses. has a past medical history of Diabetes mellitus ([facility] [address] GERD (gastroesophageal reflux disease), and Hypertension. has no past surgical history on file.     Restrictions  Restrictions/Precautions  Restrictions/Precautions: General Precautions, Fall Risk  Position Activity Restriction  Other position/activity restrictions: up with assist  Subjective   General  Chart Reviewed: Yes  Patient assessed for rehabilitation services?: Yes  Response to previous treatment: Patient with no complaints from previous session  Family / Caregiver Present: No  Referring Practitioner: Kimberlyn Short MD  Diagnosis: CVA with Left hemiparesis  Subjective  Subjective: Pt up in chair on arrival first session, very pleasant and agreeable to treatment  General Comment  Comments: RN cleared pt for OT; pt resting in bed, agreeable to therapy  Vital Signs  Patient Currently in Pain: Denies   Orientation  Orientation  Overall Orientation Status: Within Functional Limits  Objective    ADL  Feeding: Modified independent ; Increased time to complete  Grooming: Setup(seated)  LE Dressing: Minimal assistance;Setup(to doff shoes)        Balance  Sitting Balance: Supervision(chair level)  Standing Balance:  Moderate assistance(dynamic standing)  Standing Balance  Time: 1-2 minutes, 2-3 minutes over multiple trials  Activity: mobility  Functional Mobility  Functional - Mobility Device: Rolling Walker(L clamshell)  Activity: Other  Assist Level: Dependent/Total(w/c follow, Min A x1 + Mod-Max A x1 with fatigue)  Bed mobility  Supine to Sit: Unable to assess(Pt up in chair on arrival first session, in bed on arrival second session)  Sit to Supine: Unable to assess(pt up in chair at end of first session, in bed at beginning of second session)  Scooting: Minimal assistance  Transfers  Stand Pivot Transfers: Contact guard assistance;Minimal assistance;Maximum assistance(CGA-Min A to R side, up to Mod-Max A to L)  Sit Pivot Transfers: Minimal assistance;Contact guard assistance  Sit to stand: Contact guard assistance;Minimal assistance  Stand to sit: Contact guard assistance        Coordination  Fine Motor: Pt continues to be limited by LUE control for grasping objects, able to grossly push objects on tabletop with LUE with increased time      Cognition  Overall Cognitive Status: WFL     Perception  Overall Perceptual Status: Impaired  Unilateral Attention: Cues to maintain midline in sitting;Cues to maintain midline in standing;Cues to attend to left side of body     Positioning  Bed Positioning Type: Arm elevated; Posterior hip  Bed Postion Comment: Use of wedge for pt to lay more to R side d/t chronic tailbone pain with positioning, LUE in neutral with use of pillow        Type of ROM/Therapeutic Exercise  Type of ROM/Therapeutic Exercise: AROM; Self PROM  Comment: BUE in semi-hayward's  Exercises  Shoulder Flexion: x10 AROM LUE to <90*, self PROM x10 to 90*  Elbow Flexion: x15  Elbow Extension: x15  Supination: x15  Pronation: x15  Wrist Flexion: x10  Wrist Extension: x10    Plan   Plan  Times per week: 5-7 days/ wk  Times per day: Daily  Plan weeks: 2 weeks  Current Treatment Recommendations: Balance Training, Functional Mobility Training, Safety Education & Training, Positioning, Neuromuscular Re-education, Self-Care / ADL, Strengthening, Gait Training, Patient/Caregiver Education & Training, Stair training, ROM, Equipment Evaluation, Education, & procurement, Home Management Training, Endurance Training, Pain Management, Wheelchair Mobility Training, Cognitive/Perceptual Training, Cognitive Reorientation, Modalities (comment)    Goals  Short term goals  Time Frame for Short term goals: 1 week (3-26-21)  Short term goal 1: Pt will complete functional transfers mod A-MET 3/25 mod A  Short term goal 2: Pt will complete UBD using bisi-techniques mod A-MET 3/25 mod A  Short term goal 3: min assist with LUE self ROM/positioning - GOAL MET 3/29  Short term goal 4: Pt will complete toileting mod A  Long term goals  Time Frame for Long term goals : 2 weeks (4-2-21)  Long term goal 1: Pt will complete functional transfers SBA  Long term goal 2: Pt will complete UBD using bisi-techniques SBA  Long term goal 3: Pt will complete toileting CGA  Patient Goals   Patient goals : \"get stronger so I can get back to my normal routines\"       Therapy Time   Individual Concurrent Group Co-treatment   Time In 1030         Time Out 1100         Minutes 30         Timed Code Treatment Minutes: 30 Minutes     Second Session Therapy Time:   Individual Concurrent Group Co-treatment   Time In      1100   Time Out      1130   Minutes      30     Timed Code Treatment Minutes:  30    Third Session Therapy Time:   Individual Concurrent Group Co-treatment   Time In 1430        Time Out 1500        Minutes 30          Timed Code Treatment Minutes:  30    Total Treatment Minutes:  90      Moisés Castillo OTR/L 175.26

## 2023-03-18 ENCOUNTER — HOSPITAL ENCOUNTER (OUTPATIENT)
Dept: ULTRASOUND IMAGING | Age: 73
Discharge: HOME | End: 2023-03-18
Payer: COMMERCIAL

## 2023-03-18 DIAGNOSIS — N28.9 DECREASED RENAL FUNCTION: ICD-10-CM

## 2023-03-18 PROCEDURE — 76770 US EXAM ABDO BACK WALL COMP: CPT
